# Patient Record
Sex: FEMALE | Race: WHITE | Employment: UNEMPLOYED | URBAN - METROPOLITAN AREA
[De-identification: names, ages, dates, MRNs, and addresses within clinical notes are randomized per-mention and may not be internally consistent; named-entity substitution may affect disease eponyms.]

---

## 2011-01-14 LAB
INR BLD: NORMAL
PROTIME: NORMAL

## 2018-02-08 PROBLEM — S72.432A CLOSED BICONDYLAR FRACTURE OF DISTAL FEMUR, LEFT, INITIAL ENCOUNTER (HCC): Status: ACTIVE | Noted: 2018-02-08

## 2018-02-08 PROBLEM — S72.422A CLOSED BICONDYLAR FRACTURE OF DISTAL FEMUR, LEFT, INITIAL ENCOUNTER (HCC): Status: ACTIVE | Noted: 2018-02-08

## 2018-02-08 PROBLEM — S72.432A CLOSED BICONDYLAR FRACTURE OF DISTAL FEMUR, LEFT, INITIAL ENCOUNTER (HCC): Status: RESOLVED | Noted: 2018-02-08 | Resolved: 2018-02-08

## 2018-02-08 PROBLEM — S72.422A CLOSED BICONDYLAR FRACTURE OF DISTAL FEMUR, LEFT, INITIAL ENCOUNTER (HCC): Status: RESOLVED | Noted: 2018-02-08 | Resolved: 2018-02-08

## 2018-02-08 PROBLEM — S72.451A SUPRACONDYLAR FRACTURE OF FEMUR, RIGHT, CLOSED, INITIAL ENCOUNTER (HCC): Status: ACTIVE | Noted: 2018-02-08

## 2018-02-08 PROBLEM — S72.8X1A OTHER FRACTURE OF RIGHT FEMUR, INITIAL ENCOUNTER FOR CLOSED FRACTURE (HCC): Status: ACTIVE | Noted: 2018-02-08

## 2018-02-08 PROBLEM — M17.11 DEGENERATIVE ARTHRITIS OF RIGHT KNEE: Status: ACTIVE | Noted: 2018-02-08

## 2018-06-19 ENCOUNTER — HOSPITAL ENCOUNTER (INPATIENT)
Age: 83
LOS: 4 days | Discharge: NURSING, REHAB OR LONG-TERM CARE | DRG: 470 | End: 2018-06-23
Attending: EMERGENCY MEDICINE | Admitting: INTERNAL MEDICINE
Payer: MEDICARE

## 2018-06-19 ENCOUNTER — APPOINTMENT (OUTPATIENT)
Dept: GENERAL RADIOLOGY | Age: 83
DRG: 470 | End: 2018-06-19
Payer: MEDICARE

## 2018-06-19 DIAGNOSIS — S72.002A CLOSED FRACTURE OF LEFT HIP, INITIAL ENCOUNTER (HCC): Primary | ICD-10-CM

## 2018-06-19 PROBLEM — M80.059A: Status: ACTIVE | Noted: 2018-06-19

## 2018-06-19 LAB
ABO/RH: NORMAL
ANION GAP SERPL CALCULATED.3IONS-SCNC: 13 MMOL/L (ref 7–16)
ANTIBODY SCREEN: NORMAL
APTT: 40.5 SEC (ref 24.5–35.1)
BASOPHILS ABSOLUTE: 0.08 E9/L (ref 0–0.2)
BASOPHILS RELATIVE PERCENT: 1.3 % (ref 0–2)
BUN BLDV-MCNC: 25 MG/DL (ref 8–23)
CALCIUM SERPL-MCNC: 9.3 MG/DL (ref 8.6–10.2)
CHLORIDE BLD-SCNC: 102 MMOL/L (ref 98–107)
CO2: 25 MMOL/L (ref 22–29)
CREAT SERPL-MCNC: 0.9 MG/DL (ref 0.5–1)
EOSINOPHILS ABSOLUTE: 1.08 E9/L (ref 0.05–0.5)
EOSINOPHILS RELATIVE PERCENT: 17.3 % (ref 0–6)
GFR AFRICAN AMERICAN: >60
GFR NON-AFRICAN AMERICAN: 59 ML/MIN/1.73
GLUCOSE BLD-MCNC: 100 MG/DL (ref 74–109)
HCT VFR BLD CALC: 38.6 % (ref 34–48)
HEMOGLOBIN: 12.6 G/DL (ref 11.5–15.5)
IMMATURE GRANULOCYTES #: 0.02 E9/L
IMMATURE GRANULOCYTES %: 0.3 % (ref 0–5)
INR BLD: 2.6
LYMPHOCYTES ABSOLUTE: 1.42 E9/L (ref 1.5–4)
LYMPHOCYTES RELATIVE PERCENT: 22.8 % (ref 20–42)
MCH RBC QN AUTO: 34 PG (ref 26–35)
MCHC RBC AUTO-ENTMCNC: 32.6 % (ref 32–34.5)
MCV RBC AUTO: 104 FL (ref 80–99.9)
MONOCYTES ABSOLUTE: 0.55 E9/L (ref 0.1–0.95)
MONOCYTES RELATIVE PERCENT: 8.8 % (ref 2–12)
NEUTROPHILS ABSOLUTE: 3.08 E9/L (ref 1.8–7.3)
NEUTROPHILS RELATIVE PERCENT: 49.5 % (ref 43–80)
PDW BLD-RTO: 14.8 FL (ref 11.5–15)
PLATELET # BLD: 204 E9/L (ref 130–450)
PMV BLD AUTO: 10.7 FL (ref 7–12)
POTASSIUM SERPL-SCNC: 4.4 MMOL/L (ref 3.5–5)
PROTHROMBIN TIME: 29.5 SEC (ref 9.3–12.4)
RBC # BLD: 3.71 E12/L (ref 3.5–5.5)
SODIUM BLD-SCNC: 140 MMOL/L (ref 132–146)
WBC # BLD: 6.2 E9/L (ref 4.5–11.5)

## 2018-06-19 PROCEDURE — 85025 COMPLETE CBC W/AUTO DIFF WBC: CPT

## 2018-06-19 PROCEDURE — 99285 EMERGENCY DEPT VISIT HI MDM: CPT

## 2018-06-19 PROCEDURE — 85730 THROMBOPLASTIN TIME PARTIAL: CPT

## 2018-06-19 PROCEDURE — 1200000000 HC SEMI PRIVATE

## 2018-06-19 PROCEDURE — 2580000003 HC RX 258: Performed by: INTERNAL MEDICINE

## 2018-06-19 PROCEDURE — 86850 RBC ANTIBODY SCREEN: CPT

## 2018-06-19 PROCEDURE — 80048 BASIC METABOLIC PNL TOTAL CA: CPT

## 2018-06-19 PROCEDURE — 6360000002 HC RX W HCPCS: Performed by: EMERGENCY MEDICINE

## 2018-06-19 PROCEDURE — 86901 BLOOD TYPING SEROLOGIC RH(D): CPT

## 2018-06-19 PROCEDURE — 6360000002 HC RX W HCPCS: Performed by: INTERNAL MEDICINE

## 2018-06-19 PROCEDURE — 73502 X-RAY EXAM HIP UNI 2-3 VIEWS: CPT

## 2018-06-19 PROCEDURE — 86900 BLOOD TYPING SEROLOGIC ABO: CPT

## 2018-06-19 PROCEDURE — 6370000000 HC RX 637 (ALT 250 FOR IP): Performed by: ORTHOPAEDIC SURGERY

## 2018-06-19 PROCEDURE — 72170 X-RAY EXAM OF PELVIS: CPT

## 2018-06-19 PROCEDURE — 85610 PROTHROMBIN TIME: CPT

## 2018-06-19 PROCEDURE — 71045 X-RAY EXAM CHEST 1 VIEW: CPT

## 2018-06-19 PROCEDURE — 6370000000 HC RX 637 (ALT 250 FOR IP): Performed by: EMERGENCY MEDICINE

## 2018-06-19 RX ORDER — ONDANSETRON 2 MG/ML
4 INJECTION INTRAMUSCULAR; INTRAVENOUS EVERY 6 HOURS PRN
Status: DISCONTINUED | OUTPATIENT
Start: 2018-06-19 | End: 2018-06-23 | Stop reason: HOSPADM

## 2018-06-19 RX ORDER — HYDROCODONE BITARTRATE AND ACETAMINOPHEN 5; 325 MG/1; MG/1
1 TABLET ORAL EVERY 6 HOURS PRN
Status: DISCONTINUED | OUTPATIENT
Start: 2018-06-19 | End: 2018-06-23 | Stop reason: HOSPADM

## 2018-06-19 RX ORDER — ONDANSETRON 4 MG/1
8 TABLET, ORALLY DISINTEGRATING ORAL ONCE
Status: COMPLETED | OUTPATIENT
Start: 2018-06-19 | End: 2018-06-19

## 2018-06-19 RX ORDER — FUROSEMIDE 20 MG/1
20 TABLET ORAL DAILY PRN
Status: DISCONTINUED | OUTPATIENT
Start: 2018-06-19 | End: 2018-06-23 | Stop reason: HOSPADM

## 2018-06-19 RX ORDER — CETIRIZINE HYDROCHLORIDE 10 MG/1
10 TABLET ORAL EVERY MORNING
Status: DISCONTINUED | OUTPATIENT
Start: 2018-06-20 | End: 2018-06-23 | Stop reason: HOSPADM

## 2018-06-19 RX ORDER — DOCUSATE SODIUM 100 MG/1
200 CAPSULE, LIQUID FILLED ORAL EVERY MORNING
Status: DISCONTINUED | OUTPATIENT
Start: 2018-06-20 | End: 2018-06-21 | Stop reason: SDUPTHER

## 2018-06-19 RX ORDER — POTASSIUM CHLORIDE 20 MEQ/1
40 TABLET, EXTENDED RELEASE ORAL PRN
Status: DISCONTINUED | OUTPATIENT
Start: 2018-06-19 | End: 2018-06-23 | Stop reason: HOSPADM

## 2018-06-19 RX ORDER — MORPHINE SULFATE 2 MG/ML
1 INJECTION, SOLUTION INTRAMUSCULAR; INTRAVENOUS EVERY 4 HOURS PRN
Status: DISCONTINUED | OUTPATIENT
Start: 2018-06-19 | End: 2018-06-21

## 2018-06-19 RX ORDER — ALLOPURINOL 300 MG/1
300 TABLET ORAL EVERY MORNING
Status: DISCONTINUED | OUTPATIENT
Start: 2018-06-20 | End: 2018-06-23 | Stop reason: HOSPADM

## 2018-06-19 RX ORDER — SODIUM CHLORIDE 0.9 % (FLUSH) 0.9 %
10 SYRINGE (ML) INJECTION PRN
Status: DISCONTINUED | OUTPATIENT
Start: 2018-06-19 | End: 2018-06-21 | Stop reason: SDUPTHER

## 2018-06-19 RX ORDER — WARFARIN SODIUM 2.5 MG/1
2.5 TABLET ORAL DAILY
COMMUNITY
End: 2020-08-25 | Stop reason: ALTCHOICE

## 2018-06-19 RX ORDER — POTASSIUM CHLORIDE 20MEQ/15ML
40 LIQUID (ML) ORAL PRN
Status: DISCONTINUED | OUTPATIENT
Start: 2018-06-19 | End: 2018-06-23 | Stop reason: HOSPADM

## 2018-06-19 RX ORDER — TRAMADOL HYDROCHLORIDE 50 MG/1
100 TABLET ORAL ONCE
Status: COMPLETED | OUTPATIENT
Start: 2018-06-19 | End: 2018-06-19

## 2018-06-19 RX ORDER — PHYTONADIONE 5 MG/1
5 TABLET ORAL ONCE
Status: COMPLETED | OUTPATIENT
Start: 2018-06-19 | End: 2018-06-19

## 2018-06-19 RX ORDER — OXYBUTYNIN CHLORIDE 5 MG/1
5 TABLET ORAL EVERY MORNING
Status: DISCONTINUED | OUTPATIENT
Start: 2018-06-20 | End: 2018-06-23 | Stop reason: HOSPADM

## 2018-06-19 RX ORDER — POTASSIUM CHLORIDE 7.45 MG/ML
10 INJECTION INTRAVENOUS PRN
Status: DISCONTINUED | OUTPATIENT
Start: 2018-06-19 | End: 2018-06-23 | Stop reason: HOSPADM

## 2018-06-19 RX ORDER — PHYTONADIONE 10 MG/ML
3 INJECTION, EMULSION INTRAMUSCULAR; INTRAVENOUS; SUBCUTANEOUS ONCE
Status: COMPLETED | OUTPATIENT
Start: 2018-06-19 | End: 2018-06-19

## 2018-06-19 RX ORDER — SODIUM CHLORIDE 0.9 % (FLUSH) 0.9 %
10 SYRINGE (ML) INJECTION EVERY 12 HOURS SCHEDULED
Status: DISCONTINUED | OUTPATIENT
Start: 2018-06-19 | End: 2018-06-21 | Stop reason: SDUPTHER

## 2018-06-19 RX ADMIN — ONDANSETRON 8 MG: 4 TABLET, ORALLY DISINTEGRATING ORAL at 18:44

## 2018-06-19 RX ADMIN — Medication 10 ML: at 22:35

## 2018-06-19 RX ADMIN — PHYTONADIONE 5 MG: 5 TABLET ORAL at 19:54

## 2018-06-19 RX ADMIN — PHYTONADIONE 3 MG: 10 INJECTION, EMULSION INTRAMUSCULAR; INTRAVENOUS; SUBCUTANEOUS at 22:23

## 2018-06-19 RX ADMIN — TRAMADOL HYDROCHLORIDE 100 MG: 50 TABLET, FILM COATED ORAL at 18:44

## 2018-06-19 ASSESSMENT — ENCOUNTER SYMPTOMS
SHORTNESS OF BREATH: 0
SORE THROAT: 0
BACK PAIN: 0
COUGH: 0
WHEEZING: 0
RHINORRHEA: 0
CONSTIPATION: 0
DIARRHEA: 0
NAUSEA: 0
ABDOMINAL PAIN: 0
VOMITING: 0
CHEST TIGHTNESS: 0

## 2018-06-19 ASSESSMENT — PAIN DESCRIPTION - PAIN TYPE
TYPE: ACUTE PAIN
TYPE: ACUTE PAIN

## 2018-06-19 ASSESSMENT — PAIN DESCRIPTION - ORIENTATION: ORIENTATION: LEFT

## 2018-06-19 ASSESSMENT — PAIN DESCRIPTION - LOCATION
LOCATION: HIP
LOCATION: HIP

## 2018-06-19 ASSESSMENT — PAIN SCALES - GENERAL
PAINLEVEL_OUTOF10: 2
PAINLEVEL_OUTOF10: 6
PAINLEVEL_OUTOF10: 5

## 2018-06-19 ASSESSMENT — PAIN DESCRIPTION - DESCRIPTORS: DESCRIPTORS: ACHING;CONSTANT;DISCOMFORT

## 2018-06-20 ENCOUNTER — ANESTHESIA EVENT (OUTPATIENT)
Dept: OPERATING ROOM | Age: 83
DRG: 470 | End: 2018-06-20
Payer: MEDICARE

## 2018-06-20 ENCOUNTER — ANESTHESIA (OUTPATIENT)
Dept: OPERATING ROOM | Age: 83
DRG: 470 | End: 2018-06-20
Payer: MEDICARE

## 2018-06-20 LAB
ANION GAP SERPL CALCULATED.3IONS-SCNC: 11 MMOL/L (ref 7–16)
BASOPHILS ABSOLUTE: 0.1 E9/L (ref 0–0.2)
BASOPHILS RELATIVE PERCENT: 2.1 % (ref 0–2)
BUN BLDV-MCNC: 24 MG/DL (ref 8–23)
CALCIUM SERPL-MCNC: 9.3 MG/DL (ref 8.6–10.2)
CHLORIDE BLD-SCNC: 103 MMOL/L (ref 98–107)
CO2: 26 MMOL/L (ref 22–29)
CREAT SERPL-MCNC: 0.9 MG/DL (ref 0.5–1)
EOSINOPHILS ABSOLUTE: 1.02 E9/L (ref 0.05–0.5)
EOSINOPHILS RELATIVE PERCENT: 21.1 % (ref 0–6)
GFR AFRICAN AMERICAN: >60
GFR NON-AFRICAN AMERICAN: 59 ML/MIN/1.73
GLUCOSE BLD-MCNC: 90 MG/DL (ref 74–109)
HCT VFR BLD CALC: 36.3 % (ref 34–48)
HEMOGLOBIN: 11.8 G/DL (ref 11.5–15.5)
IMMATURE GRANULOCYTES #: 0.01 E9/L
IMMATURE GRANULOCYTES %: 0.2 % (ref 0–5)
INR BLD: 1.7
INR BLD: 2.3
LYMPHOCYTES ABSOLUTE: 1.55 E9/L (ref 1.5–4)
LYMPHOCYTES RELATIVE PERCENT: 32.1 % (ref 20–42)
MCH RBC QN AUTO: 33.9 PG (ref 26–35)
MCHC RBC AUTO-ENTMCNC: 32.5 % (ref 32–34.5)
MCV RBC AUTO: 104.3 FL (ref 80–99.9)
MONOCYTES ABSOLUTE: 0.5 E9/L (ref 0.1–0.95)
MONOCYTES RELATIVE PERCENT: 10.4 % (ref 2–12)
NEUTROPHILS ABSOLUTE: 1.65 E9/L (ref 1.8–7.3)
NEUTROPHILS RELATIVE PERCENT: 34.1 % (ref 43–80)
PDW BLD-RTO: 14.8 FL (ref 11.5–15)
PLATELET # BLD: 186 E9/L (ref 130–450)
PMV BLD AUTO: 10.2 FL (ref 7–12)
POTASSIUM REFLEX MAGNESIUM: 4.6 MMOL/L (ref 3.5–5)
PROTHROMBIN TIME: 19.6 SEC (ref 9.3–12.4)
PROTHROMBIN TIME: 26.5 SEC (ref 9.3–12.4)
RBC # BLD: 3.48 E12/L (ref 3.5–5.5)
SODIUM BLD-SCNC: 140 MMOL/L (ref 132–146)
WBC # BLD: 4.8 E9/L (ref 4.5–11.5)

## 2018-06-20 PROCEDURE — 2580000003 HC RX 258: Performed by: INTERNAL MEDICINE

## 2018-06-20 PROCEDURE — 1200000000 HC SEMI PRIVATE

## 2018-06-20 PROCEDURE — 2580000003 HC RX 258: Performed by: ORTHOPAEDIC SURGERY

## 2018-06-20 PROCEDURE — 80048 BASIC METABOLIC PNL TOTAL CA: CPT

## 2018-06-20 PROCEDURE — 6370000000 HC RX 637 (ALT 250 FOR IP): Performed by: INTERNAL MEDICINE

## 2018-06-20 PROCEDURE — 6360000002 HC RX W HCPCS: Performed by: INTERNAL MEDICINE

## 2018-06-20 PROCEDURE — 85025 COMPLETE CBC W/AUTO DIFF WBC: CPT

## 2018-06-20 PROCEDURE — 85610 PROTHROMBIN TIME: CPT

## 2018-06-20 PROCEDURE — 6360000002 HC RX W HCPCS: Performed by: ORTHOPAEDIC SURGERY

## 2018-06-20 PROCEDURE — 36415 COLL VENOUS BLD VENIPUNCTURE: CPT

## 2018-06-20 RX ORDER — PHYTONADIONE 1 MG/.5ML
5 INJECTION, EMULSION INTRAMUSCULAR; INTRAVENOUS; SUBCUTANEOUS ONCE
Status: DISCONTINUED | OUTPATIENT
Start: 2018-06-20 | End: 2018-06-20

## 2018-06-20 RX ORDER — SODIUM CHLORIDE 9 MG/ML
INJECTION, SOLUTION INTRAVENOUS CONTINUOUS
Status: DISCONTINUED | OUTPATIENT
Start: 2018-06-20 | End: 2018-06-23

## 2018-06-20 RX ORDER — PHYTONADIONE 10 MG/ML
5 INJECTION, EMULSION INTRAMUSCULAR; INTRAVENOUS; SUBCUTANEOUS ONCE
Status: COMPLETED | OUTPATIENT
Start: 2018-06-20 | End: 2018-06-20

## 2018-06-20 RX ADMIN — SODIUM CHLORIDE: 9 INJECTION, SOLUTION INTRAVENOUS at 18:12

## 2018-06-20 RX ADMIN — Medication 10 ML: at 10:49

## 2018-06-20 RX ADMIN — MORPHINE SULFATE 1 MG: 2 INJECTION, SOLUTION INTRAMUSCULAR; INTRAVENOUS at 21:47

## 2018-06-20 RX ADMIN — PHYTONADIONE 5 MG: 10 INJECTION, EMULSION INTRAMUSCULAR; INTRAVENOUS; SUBCUTANEOUS at 11:26

## 2018-06-20 RX ADMIN — HYDROCODONE BITARTRATE AND ACETAMINOPHEN 1 TABLET: 5; 325 TABLET ORAL at 16:34

## 2018-06-20 RX ADMIN — MORPHINE SULFATE 1 MG: 2 INJECTION, SOLUTION INTRAMUSCULAR; INTRAVENOUS at 03:37

## 2018-06-20 ASSESSMENT — PAIN SCALES - GENERAL
PAINLEVEL_OUTOF10: 0
PAINLEVEL_OUTOF10: 0
PAINLEVEL_OUTOF10: 7
PAINLEVEL_OUTOF10: 6
PAINLEVEL_OUTOF10: 6
PAINLEVEL_OUTOF10: 2

## 2018-06-20 ASSESSMENT — PAIN DESCRIPTION - DESCRIPTORS
DESCRIPTORS: ACHING;DISCOMFORT
DESCRIPTORS: ACHING;DISCOMFORT;CONSTANT
DESCRIPTORS: ACHING;DISCOMFORT

## 2018-06-20 ASSESSMENT — PAIN DESCRIPTION - PAIN TYPE
TYPE: ACUTE PAIN

## 2018-06-20 ASSESSMENT — PAIN DESCRIPTION - FREQUENCY
FREQUENCY: CONTINUOUS

## 2018-06-20 ASSESSMENT — PAIN DESCRIPTION - PROGRESSION
CLINICAL_PROGRESSION: GRADUALLY IMPROVING
CLINICAL_PROGRESSION: GRADUALLY WORSENING

## 2018-06-20 ASSESSMENT — PAIN DESCRIPTION - ORIENTATION
ORIENTATION: LEFT

## 2018-06-20 ASSESSMENT — PAIN DESCRIPTION - ONSET: ONSET: ON-GOING

## 2018-06-20 ASSESSMENT — PAIN DESCRIPTION - LOCATION
LOCATION: HIP

## 2018-06-21 ENCOUNTER — APPOINTMENT (OUTPATIENT)
Dept: GENERAL RADIOLOGY | Age: 83
DRG: 470 | End: 2018-06-21
Payer: MEDICARE

## 2018-06-21 VITALS — OXYGEN SATURATION: 100 % | DIASTOLIC BLOOD PRESSURE: 73 MMHG | SYSTOLIC BLOOD PRESSURE: 147 MMHG | TEMPERATURE: 97 F

## 2018-06-21 LAB
INR BLD: 1.2
PROTHROMBIN TIME: 13.7 SEC (ref 9.3–12.4)

## 2018-06-21 PROCEDURE — 6370000000 HC RX 637 (ALT 250 FOR IP): Performed by: ORTHOPAEDIC SURGERY

## 2018-06-21 PROCEDURE — 2580000003 HC RX 258: Performed by: NURSE ANESTHETIST, CERTIFIED REGISTERED

## 2018-06-21 PROCEDURE — 2500000003 HC RX 250 WO HCPCS: Performed by: ORTHOPAEDIC SURGERY

## 2018-06-21 PROCEDURE — 3600000004 HC SURGERY LEVEL 4 BASE: Performed by: ORTHOPAEDIC SURGERY

## 2018-06-21 PROCEDURE — 7100000000 HC PACU RECOVERY - FIRST 15 MIN: Performed by: ORTHOPAEDIC SURGERY

## 2018-06-21 PROCEDURE — 2709999900 HC NON-CHARGEABLE SUPPLY: Performed by: ORTHOPAEDIC SURGERY

## 2018-06-21 PROCEDURE — 2500000003 HC RX 250 WO HCPCS: Performed by: NURSE ANESTHETIST, CERTIFIED REGISTERED

## 2018-06-21 PROCEDURE — 1200000000 HC SEMI PRIVATE

## 2018-06-21 PROCEDURE — 87081 CULTURE SCREEN ONLY: CPT

## 2018-06-21 PROCEDURE — 73501 X-RAY EXAM HIP UNI 1 VIEW: CPT

## 2018-06-21 PROCEDURE — 3700000001 HC ADD 15 MINUTES (ANESTHESIA): Performed by: ORTHOPAEDIC SURGERY

## 2018-06-21 PROCEDURE — 3700000000 HC ANESTHESIA ATTENDED CARE: Performed by: ORTHOPAEDIC SURGERY

## 2018-06-21 PROCEDURE — 2580000003 HC RX 258: Performed by: ORTHOPAEDIC SURGERY

## 2018-06-21 PROCEDURE — 0SRS0J9 REPLACEMENT OF LEFT HIP JOINT, FEMORAL SURFACE WITH SYNTHETIC SUBSTITUTE, CEMENTED, OPEN APPROACH: ICD-10-PCS | Performed by: ORTHOPAEDIC SURGERY

## 2018-06-21 PROCEDURE — 6360000002 HC RX W HCPCS

## 2018-06-21 PROCEDURE — 3600000014 HC SURGERY LEVEL 4 ADDTL 15MIN: Performed by: ORTHOPAEDIC SURGERY

## 2018-06-21 PROCEDURE — 6360000002 HC RX W HCPCS: Performed by: ORTHOPAEDIC SURGERY

## 2018-06-21 PROCEDURE — C1713 ANCHOR/SCREW BN/BN,TIS/BN: HCPCS | Performed by: ORTHOPAEDIC SURGERY

## 2018-06-21 PROCEDURE — 6360000002 HC RX W HCPCS: Performed by: NURSE ANESTHETIST, CERTIFIED REGISTERED

## 2018-06-21 PROCEDURE — 7100000001 HC PACU RECOVERY - ADDTL 15 MIN: Performed by: ORTHOPAEDIC SURGERY

## 2018-06-21 PROCEDURE — C1776 JOINT DEVICE (IMPLANTABLE): HCPCS | Performed by: ORTHOPAEDIC SURGERY

## 2018-06-21 PROCEDURE — 88305 TISSUE EXAM BY PATHOLOGIST: CPT

## 2018-06-21 PROCEDURE — 6370000000 HC RX 637 (ALT 250 FOR IP): Performed by: INTERNAL MEDICINE

## 2018-06-21 PROCEDURE — 36415 COLL VENOUS BLD VENIPUNCTURE: CPT

## 2018-06-21 PROCEDURE — 2780000010 HC IMPLANT OTHER: Performed by: ORTHOPAEDIC SURGERY

## 2018-06-21 PROCEDURE — 85610 PROTHROMBIN TIME: CPT

## 2018-06-21 PROCEDURE — 88311 DECALCIFY TISSUE: CPT

## 2018-06-21 DEVICE — RESTRICTOR BNE CEM SM DIA18.5MM UNIV FEM CNL DISP W/ INSRT: Type: IMPLANTABLE DEVICE | Site: HIP | Status: FUNCTIONAL

## 2018-06-21 DEVICE — ADAPTER HEAD FEMORAL UNIPOLAR 12/14 +7.0MM: Type: IMPLANTABLE DEVICE | Site: HIP | Status: FUNCTIONAL

## 2018-06-21 DEVICE — DISCONTINUED USE 415964 CEMENT PALACOS R + G SING DOSE 40GR: Type: IMPLANTABLE DEVICE | Site: HIP | Status: FUNCTIONAL

## 2018-06-21 DEVICE — IMPLANTABLE DEVICE: Type: IMPLANTABLE DEVICE | Site: HIP | Status: FUNCTIONAL

## 2018-06-21 DEVICE — IMPL HIP VERSYS DISTAL CENTRALIZER 10MM: Type: IMPLANTABLE DEVICE | Site: HIP | Status: FUNCTIONAL

## 2018-06-21 RX ORDER — VANCOMYCIN HYDROCHLORIDE 500 MG/10ML
INJECTION, POWDER, LYOPHILIZED, FOR SOLUTION INTRAVENOUS PRN
Status: DISCONTINUED | OUTPATIENT
Start: 2018-06-21 | End: 2018-06-21 | Stop reason: HOSPADM

## 2018-06-21 RX ORDER — ROCURONIUM BROMIDE 10 MG/ML
INJECTION, SOLUTION INTRAVENOUS PRN
Status: DISCONTINUED | OUTPATIENT
Start: 2018-06-21 | End: 2018-06-21 | Stop reason: SDUPTHER

## 2018-06-21 RX ORDER — SODIUM CHLORIDE 9 MG/ML
INJECTION, SOLUTION INTRAVENOUS CONTINUOUS
Status: DISCONTINUED | OUTPATIENT
Start: 2018-06-21 | End: 2018-06-23

## 2018-06-21 RX ORDER — TRAMADOL HYDROCHLORIDE 50 MG/1
50 TABLET ORAL 4 TIMES DAILY
Status: DISCONTINUED | OUTPATIENT
Start: 2018-06-21 | End: 2018-06-23 | Stop reason: HOSPADM

## 2018-06-21 RX ORDER — FENTANYL CITRATE 50 UG/ML
INJECTION, SOLUTION INTRAMUSCULAR; INTRAVENOUS PRN
Status: DISCONTINUED | OUTPATIENT
Start: 2018-06-21 | End: 2018-06-21 | Stop reason: SDUPTHER

## 2018-06-21 RX ORDER — SODIUM CHLORIDE, SODIUM LACTATE, POTASSIUM CHLORIDE, CALCIUM CHLORIDE 600; 310; 30; 20 MG/100ML; MG/100ML; MG/100ML; MG/100ML
INJECTION, SOLUTION INTRAVENOUS CONTINUOUS PRN
Status: DISCONTINUED | OUTPATIENT
Start: 2018-06-21 | End: 2018-06-21 | Stop reason: SDUPTHER

## 2018-06-21 RX ORDER — SODIUM CHLORIDE 0.9 % (FLUSH) 0.9 %
10 SYRINGE (ML) INJECTION PRN
Status: DISCONTINUED | OUTPATIENT
Start: 2018-06-21 | End: 2018-06-23 | Stop reason: HOSPADM

## 2018-06-21 RX ORDER — CEFAZOLIN SODIUM 1 G/3ML
INJECTION, POWDER, FOR SOLUTION INTRAMUSCULAR; INTRAVENOUS
Status: DISCONTINUED
Start: 2018-06-21 | End: 2018-06-21 | Stop reason: WASHOUT

## 2018-06-21 RX ORDER — DEXAMETHASONE SODIUM PHOSPHATE 4 MG/ML
INJECTION, SOLUTION INTRA-ARTICULAR; INTRALESIONAL; INTRAMUSCULAR; INTRAVENOUS; SOFT TISSUE PRN
Status: DISCONTINUED | OUTPATIENT
Start: 2018-06-21 | End: 2018-06-21 | Stop reason: SDUPTHER

## 2018-06-21 RX ORDER — FENTANYL CITRATE 50 UG/ML
50 INJECTION, SOLUTION INTRAMUSCULAR; INTRAVENOUS EVERY 5 MIN PRN
Status: DISCONTINUED | OUTPATIENT
Start: 2018-06-21 | End: 2018-06-21 | Stop reason: HOSPADM

## 2018-06-21 RX ORDER — PROPOFOL 10 MG/ML
INJECTION, EMULSION INTRAVENOUS CONTINUOUS PRN
Status: DISCONTINUED | OUTPATIENT
Start: 2018-06-21 | End: 2018-06-21

## 2018-06-21 RX ORDER — OXYCODONE HYDROCHLORIDE 5 MG/1
2.5 TABLET ORAL EVERY 4 HOURS PRN
Status: DISCONTINUED | OUTPATIENT
Start: 2018-06-21 | End: 2018-06-23 | Stop reason: HOSPADM

## 2018-06-21 RX ORDER — MEPERIDINE HYDROCHLORIDE 25 MG/ML
12.5 INJECTION INTRAMUSCULAR; INTRAVENOUS; SUBCUTANEOUS
Status: DISCONTINUED | OUTPATIENT
Start: 2018-06-21 | End: 2018-06-21 | Stop reason: HOSPADM

## 2018-06-21 RX ORDER — WARFARIN SODIUM 2.5 MG/1
2.5 TABLET ORAL
Status: COMPLETED | OUTPATIENT
Start: 2018-06-21 | End: 2018-06-21

## 2018-06-21 RX ORDER — LIDOCAINE HYDROCHLORIDE 20 MG/ML
INJECTION, SOLUTION INFILTRATION; PERINEURAL PRN
Status: DISCONTINUED | OUTPATIENT
Start: 2018-06-21 | End: 2018-06-21 | Stop reason: SDUPTHER

## 2018-06-21 RX ORDER — OXYCODONE HYDROCHLORIDE AND ACETAMINOPHEN 5; 325 MG/1; MG/1
1 TABLET ORAL
Status: DISCONTINUED | OUTPATIENT
Start: 2018-06-21 | End: 2018-06-21 | Stop reason: HOSPADM

## 2018-06-21 RX ORDER — OXYCODONE HYDROCHLORIDE 5 MG/1
5 TABLET ORAL EVERY 6 HOURS PRN
Status: DISCONTINUED | OUTPATIENT
Start: 2018-06-21 | End: 2018-06-23 | Stop reason: HOSPADM

## 2018-06-21 RX ORDER — SODIUM CHLORIDE 0.9 % (FLUSH) 0.9 %
10 SYRINGE (ML) INJECTION EVERY 12 HOURS SCHEDULED
Status: DISCONTINUED | OUTPATIENT
Start: 2018-06-21 | End: 2018-06-23 | Stop reason: HOSPADM

## 2018-06-21 RX ORDER — ACETAMINOPHEN 325 MG/1
650 TABLET ORAL 4 TIMES DAILY
Status: DISCONTINUED | OUTPATIENT
Start: 2018-06-21 | End: 2018-06-23 | Stop reason: HOSPADM

## 2018-06-21 RX ORDER — DOCUSATE SODIUM 100 MG/1
100 CAPSULE, LIQUID FILLED ORAL 2 TIMES DAILY
Status: DISCONTINUED | OUTPATIENT
Start: 2018-06-21 | End: 2018-06-23 | Stop reason: HOSPADM

## 2018-06-21 RX ORDER — PROMETHAZINE HYDROCHLORIDE 25 MG/ML
6.25 INJECTION, SOLUTION INTRAMUSCULAR; INTRAVENOUS
Status: DISCONTINUED | OUTPATIENT
Start: 2018-06-21 | End: 2018-06-21 | Stop reason: HOSPADM

## 2018-06-21 RX ORDER — DIPHENHYDRAMINE HYDROCHLORIDE 50 MG/ML
12.5 INJECTION INTRAMUSCULAR; INTRAVENOUS
Status: DISCONTINUED | OUTPATIENT
Start: 2018-06-21 | End: 2018-06-21 | Stop reason: HOSPADM

## 2018-06-21 RX ORDER — ONDANSETRON 2 MG/ML
INJECTION INTRAMUSCULAR; INTRAVENOUS PRN
Status: DISCONTINUED | OUTPATIENT
Start: 2018-06-21 | End: 2018-06-21 | Stop reason: SDUPTHER

## 2018-06-21 RX ORDER — GLYCOPYRROLATE 1 MG/5 ML
SYRINGE (ML) INTRAVENOUS PRN
Status: DISCONTINUED | OUTPATIENT
Start: 2018-06-21 | End: 2018-06-21 | Stop reason: SDUPTHER

## 2018-06-21 RX ORDER — NEOSTIGMINE METHYLSULFATE 1 MG/ML
INJECTION, SOLUTION INTRAVENOUS PRN
Status: DISCONTINUED | OUTPATIENT
Start: 2018-06-21 | End: 2018-06-21 | Stop reason: SDUPTHER

## 2018-06-21 RX ORDER — SODIUM CHLORIDE 9 MG/ML
INJECTION, SOLUTION INTRAVENOUS CONTINUOUS PRN
Status: DISCONTINUED | OUTPATIENT
Start: 2018-06-21 | End: 2018-06-21 | Stop reason: SDUPTHER

## 2018-06-21 RX ORDER — PROPOFOL 10 MG/ML
INJECTION, EMULSION INTRAVENOUS PRN
Status: DISCONTINUED | OUTPATIENT
Start: 2018-06-21 | End: 2018-06-21 | Stop reason: SDUPTHER

## 2018-06-21 RX ADMIN — ONDANSETRON 4 MG: 2 INJECTION, SOLUTION INTRAMUSCULAR; INTRAVENOUS at 17:17

## 2018-06-21 RX ADMIN — CEFAZOLIN: 1 INJECTION, POWDER, FOR SOLUTION INTRAMUSCULAR; INTRAVENOUS; PARENTERAL at 16:30

## 2018-06-21 RX ADMIN — CEFAZOLIN SODIUM 1 G: 1 SOLUTION INTRAVENOUS at 17:05

## 2018-06-21 RX ADMIN — METOPROLOL TARTRATE 25 MG: 25 TABLET ORAL at 11:15

## 2018-06-21 RX ADMIN — SODIUM CHLORIDE, POTASSIUM CHLORIDE, SODIUM LACTATE AND CALCIUM CHLORIDE: 600; 310; 30; 20 INJECTION, SOLUTION INTRAVENOUS at 19:01

## 2018-06-21 RX ADMIN — TRANEXAMIC ACID 1000 MG: 1 INJECTION, SOLUTION INTRAVENOUS at 16:30

## 2018-06-21 RX ADMIN — Medication 0.6 MG: at 19:21

## 2018-06-21 RX ADMIN — TRANEXAMIC ACID: 100 INJECTION, SOLUTION INTRAVENOUS at 19:20

## 2018-06-21 RX ADMIN — FENTANYL CITRATE 100 MCG: 50 INJECTION, SOLUTION INTRAMUSCULAR; INTRAVENOUS at 18:00

## 2018-06-21 RX ADMIN — DOCUSATE SODIUM 100 MG: 100 CAPSULE, LIQUID FILLED ORAL at 22:18

## 2018-06-21 RX ADMIN — ROCURONIUM BROMIDE 10 MG: 10 SOLUTION INTRAVENOUS at 18:00

## 2018-06-21 RX ADMIN — LIDOCAINE HYDROCHLORIDE 100 MG: 20 INJECTION, SOLUTION INFILTRATION; PERINEURAL at 17:09

## 2018-06-21 RX ADMIN — TRANEXAMIC ACID 1 G: 100 INJECTION, SOLUTION INTRAVENOUS at 18:50

## 2018-06-21 RX ADMIN — SODIUM CHLORIDE: 9 INJECTION, SOLUTION INTRAVENOUS at 21:39

## 2018-06-21 RX ADMIN — HYDROCODONE BITARTRATE AND ACETAMINOPHEN 1 TABLET: 5; 325 TABLET ORAL at 02:14

## 2018-06-21 RX ADMIN — Medication 3 MG: at 19:21

## 2018-06-21 RX ADMIN — DEXAMETHASONE SODIUM PHOSPHATE 10 MG: 4 INJECTION, SOLUTION INTRA-ARTICULAR; INTRALESIONAL; INTRAMUSCULAR; INTRAVENOUS; SOFT TISSUE at 17:17

## 2018-06-21 RX ADMIN — PHENYLEPHRINE HYDROCHLORIDE 100 MCG: 10 INJECTION INTRAVENOUS at 18:57

## 2018-06-21 RX ADMIN — PROPOFOL 100 MG: 10 INJECTION, EMULSION INTRAVENOUS at 17:06

## 2018-06-21 RX ADMIN — SODIUM CHLORIDE: 9 INJECTION, SOLUTION INTRAVENOUS at 17:00

## 2018-06-21 RX ADMIN — FENTANYL CITRATE 50 MCG: 50 INJECTION, SOLUTION INTRAMUSCULAR; INTRAVENOUS at 17:09

## 2018-06-21 RX ADMIN — ACETAMINOPHEN 650 MG: 325 TABLET ORAL at 22:18

## 2018-06-21 RX ADMIN — TRAMADOL HYDROCHLORIDE 50 MG: 50 TABLET, FILM COATED ORAL at 22:18

## 2018-06-21 RX ADMIN — WARFARIN SODIUM 2.5 MG: 2.5 TABLET ORAL at 22:18

## 2018-06-21 RX ADMIN — TRANEXAMIC ACID 1000 MG: 1 INJECTION, SOLUTION INTRAVENOUS at 20:14

## 2018-06-21 ASSESSMENT — PULMONARY FUNCTION TESTS
PIF_VALUE: 18
PIF_VALUE: 1
PIF_VALUE: 18
PIF_VALUE: 19
PIF_VALUE: 0
PIF_VALUE: 19
PIF_VALUE: 19
PIF_VALUE: 20
PIF_VALUE: 17
PIF_VALUE: 19
PIF_VALUE: 10
PIF_VALUE: 18
PIF_VALUE: 16
PIF_VALUE: 19
PIF_VALUE: 2
PIF_VALUE: 19
PIF_VALUE: 16
PIF_VALUE: 19
PIF_VALUE: 18
PIF_VALUE: 16
PIF_VALUE: 19
PIF_VALUE: 18
PIF_VALUE: 19
PIF_VALUE: 19
PIF_VALUE: 18
PIF_VALUE: 2
PIF_VALUE: 18
PIF_VALUE: 18
PIF_VALUE: 6
PIF_VALUE: 19
PIF_VALUE: 18
PIF_VALUE: 19
PIF_VALUE: 18
PIF_VALUE: 30
PIF_VALUE: 19
PIF_VALUE: 20
PIF_VALUE: 18
PIF_VALUE: 18
PIF_VALUE: 19
PIF_VALUE: 19
PIF_VALUE: 18
PIF_VALUE: 18
PIF_VALUE: 4
PIF_VALUE: 19
PIF_VALUE: 18
PIF_VALUE: 2
PIF_VALUE: 19
PIF_VALUE: 0
PIF_VALUE: 1
PIF_VALUE: 19
PIF_VALUE: 2
PIF_VALUE: 19
PIF_VALUE: 16
PIF_VALUE: 19
PIF_VALUE: 2
PIF_VALUE: 2
PIF_VALUE: 19
PIF_VALUE: 18
PIF_VALUE: 19
PIF_VALUE: 10
PIF_VALUE: 2
PIF_VALUE: 19
PIF_VALUE: 19
PIF_VALUE: 18
PIF_VALUE: 17
PIF_VALUE: 18
PIF_VALUE: 19
PIF_VALUE: 19
PIF_VALUE: 18
PIF_VALUE: 10
PIF_VALUE: 19
PIF_VALUE: 19
PIF_VALUE: 10
PIF_VALUE: 1
PIF_VALUE: 19
PIF_VALUE: 18
PIF_VALUE: 19
PIF_VALUE: 7
PIF_VALUE: 18
PIF_VALUE: 10
PIF_VALUE: 0
PIF_VALUE: 2
PIF_VALUE: 19
PIF_VALUE: 19
PIF_VALUE: 18
PIF_VALUE: 5
PIF_VALUE: 19
PIF_VALUE: 10
PIF_VALUE: 1
PIF_VALUE: 18
PIF_VALUE: 10
PIF_VALUE: 19
PIF_VALUE: 17
PIF_VALUE: 19
PIF_VALUE: 18
PIF_VALUE: 19
PIF_VALUE: 23
PIF_VALUE: 2
PIF_VALUE: 18
PIF_VALUE: 19
PIF_VALUE: 18
PIF_VALUE: 18
PIF_VALUE: 12
PIF_VALUE: 2
PIF_VALUE: 1
PIF_VALUE: 19
PIF_VALUE: 10
PIF_VALUE: 19
PIF_VALUE: 18
PIF_VALUE: 16
PIF_VALUE: 19
PIF_VALUE: 16
PIF_VALUE: 19
PIF_VALUE: 19
PIF_VALUE: 17
PIF_VALUE: 21
PIF_VALUE: 18
PIF_VALUE: 19
PIF_VALUE: 20
PIF_VALUE: 19
PIF_VALUE: 2
PIF_VALUE: 10
PIF_VALUE: 18
PIF_VALUE: 19

## 2018-06-21 ASSESSMENT — PAIN SCALES - GENERAL
PAINLEVEL_OUTOF10: 4
PAINLEVEL_OUTOF10: 0

## 2018-06-21 ASSESSMENT — PAIN DESCRIPTION - ORIENTATION: ORIENTATION: LEFT

## 2018-06-21 ASSESSMENT — PAIN DESCRIPTION - PAIN TYPE: TYPE: ACUTE PAIN

## 2018-06-21 ASSESSMENT — PAIN DESCRIPTION - LOCATION: LOCATION: LEG

## 2018-06-21 ASSESSMENT — PAIN - FUNCTIONAL ASSESSMENT: PAIN_FUNCTIONAL_ASSESSMENT: 0-10

## 2018-06-21 ASSESSMENT — PAIN DESCRIPTION - DESCRIPTORS: DESCRIPTORS: ACHING;DISCOMFORT

## 2018-06-22 LAB
ANION GAP SERPL CALCULATED.3IONS-SCNC: 13 MMOL/L (ref 7–16)
BUN BLDV-MCNC: 25 MG/DL (ref 8–23)
CALCIUM SERPL-MCNC: 8.8 MG/DL (ref 8.6–10.2)
CHLORIDE BLD-SCNC: 101 MMOL/L (ref 98–107)
CO2: 23 MMOL/L (ref 22–29)
CREAT SERPL-MCNC: 0.9 MG/DL (ref 0.5–1)
GFR AFRICAN AMERICAN: >60
GFR NON-AFRICAN AMERICAN: 59 ML/MIN/1.73
GLUCOSE BLD-MCNC: 185 MG/DL (ref 74–109)
HCT VFR BLD CALC: 35 % (ref 34–48)
HEMOGLOBIN: 11.5 G/DL (ref 11.5–15.5)
INR BLD: 1.2
MCH RBC QN AUTO: 33.8 PG (ref 26–35)
MCHC RBC AUTO-ENTMCNC: 32.9 % (ref 32–34.5)
MCV RBC AUTO: 102.9 FL (ref 80–99.9)
PDW BLD-RTO: 14.5 FL (ref 11.5–15)
PLATELET # BLD: 171 E9/L (ref 130–450)
PMV BLD AUTO: 10.3 FL (ref 7–12)
POTASSIUM REFLEX MAGNESIUM: 5 MMOL/L (ref 3.5–5)
PROTHROMBIN TIME: 14 SEC (ref 9.3–12.4)
RBC # BLD: 3.4 E12/L (ref 3.5–5.5)
SODIUM BLD-SCNC: 137 MMOL/L (ref 132–146)
WBC # BLD: 5.7 E9/L (ref 4.5–11.5)

## 2018-06-22 PROCEDURE — 97530 THERAPEUTIC ACTIVITIES: CPT

## 2018-06-22 PROCEDURE — 36415 COLL VENOUS BLD VENIPUNCTURE: CPT

## 2018-06-22 PROCEDURE — 80048 BASIC METABOLIC PNL TOTAL CA: CPT

## 2018-06-22 PROCEDURE — 85610 PROTHROMBIN TIME: CPT

## 2018-06-22 PROCEDURE — 6370000000 HC RX 637 (ALT 250 FOR IP): Performed by: INTERNAL MEDICINE

## 2018-06-22 PROCEDURE — 6370000000 HC RX 637 (ALT 250 FOR IP): Performed by: ORTHOPAEDIC SURGERY

## 2018-06-22 PROCEDURE — G8979 MOBILITY GOAL STATUS: HCPCS

## 2018-06-22 PROCEDURE — G8987 SELF CARE CURRENT STATUS: HCPCS

## 2018-06-22 PROCEDURE — 85027 COMPLETE CBC AUTOMATED: CPT

## 2018-06-22 PROCEDURE — 97162 PT EVAL MOD COMPLEX 30 MIN: CPT

## 2018-06-22 PROCEDURE — 6360000002 HC RX W HCPCS: Performed by: ORTHOPAEDIC SURGERY

## 2018-06-22 PROCEDURE — G8978 MOBILITY CURRENT STATUS: HCPCS

## 2018-06-22 PROCEDURE — 2580000003 HC RX 258: Performed by: ORTHOPAEDIC SURGERY

## 2018-06-22 PROCEDURE — 1200000000 HC SEMI PRIVATE

## 2018-06-22 PROCEDURE — G8988 SELF CARE GOAL STATUS: HCPCS

## 2018-06-22 PROCEDURE — 97166 OT EVAL MOD COMPLEX 45 MIN: CPT

## 2018-06-22 RX ORDER — WARFARIN SODIUM 2.5 MG/1
2.5 TABLET ORAL
Status: COMPLETED | OUTPATIENT
Start: 2018-06-22 | End: 2018-06-22

## 2018-06-22 RX ADMIN — DOCUSATE SODIUM 100 MG: 100 CAPSULE, LIQUID FILLED ORAL at 20:45

## 2018-06-22 RX ADMIN — ACETAMINOPHEN 650 MG: 325 TABLET ORAL at 20:45

## 2018-06-22 RX ADMIN — DOCUSATE SODIUM 100 MG: 100 CAPSULE, LIQUID FILLED ORAL at 09:30

## 2018-06-22 RX ADMIN — METOPROLOL TARTRATE 25 MG: 25 TABLET ORAL at 20:45

## 2018-06-22 RX ADMIN — TRAMADOL HYDROCHLORIDE 50 MG: 50 TABLET, FILM COATED ORAL at 17:48

## 2018-06-22 RX ADMIN — ACETAMINOPHEN 650 MG: 325 TABLET ORAL at 09:30

## 2018-06-22 RX ADMIN — CETIRIZINE HYDROCHLORIDE 10 MG: 10 TABLET, FILM COATED ORAL at 09:29

## 2018-06-22 RX ADMIN — ACETAMINOPHEN 650 MG: 325 TABLET ORAL at 14:49

## 2018-06-22 RX ADMIN — TRAMADOL HYDROCHLORIDE 50 MG: 50 TABLET, FILM COATED ORAL at 20:46

## 2018-06-22 RX ADMIN — TRAMADOL HYDROCHLORIDE 50 MG: 50 TABLET, FILM COATED ORAL at 14:50

## 2018-06-22 RX ADMIN — ACETAMINOPHEN 650 MG: 325 TABLET ORAL at 17:48

## 2018-06-22 RX ADMIN — CEFAZOLIN 2 G: 10 INJECTION, POWDER, FOR SOLUTION INTRAVENOUS; PARENTERAL at 01:22

## 2018-06-22 RX ADMIN — OXYBUTYNIN CHLORIDE 5 MG: 5 TABLET ORAL at 09:29

## 2018-06-22 RX ADMIN — CEFAZOLIN 2 G: 10 INJECTION, POWDER, FOR SOLUTION INTRAVENOUS; PARENTERAL at 09:30

## 2018-06-22 RX ADMIN — TRAMADOL HYDROCHLORIDE 50 MG: 50 TABLET, FILM COATED ORAL at 09:29

## 2018-06-22 RX ADMIN — ALLOPURINOL 300 MG: 300 TABLET ORAL at 09:29

## 2018-06-22 RX ADMIN — ENOXAPARIN SODIUM 30 MG: 30 INJECTION SUBCUTANEOUS at 09:29

## 2018-06-22 RX ADMIN — METOPROLOL TARTRATE 25 MG: 25 TABLET ORAL at 09:30

## 2018-06-22 RX ADMIN — WARFARIN SODIUM 2.5 MG: 2.5 TABLET ORAL at 17:48

## 2018-06-22 ASSESSMENT — PAIN SCALES - GENERAL
PAINLEVEL_OUTOF10: 0

## 2018-06-23 VITALS
HEART RATE: 74 BPM | RESPIRATION RATE: 16 BRPM | SYSTOLIC BLOOD PRESSURE: 110 MMHG | BODY MASS INDEX: 29.49 KG/M2 | TEMPERATURE: 97.7 F | HEIGHT: 65 IN | OXYGEN SATURATION: 97 % | DIASTOLIC BLOOD PRESSURE: 53 MMHG | WEIGHT: 177 LBS

## 2018-06-23 PROBLEM — M17.11 DEGENERATIVE ARTHRITIS OF RIGHT KNEE: Status: RESOLVED | Noted: 2018-02-08 | Resolved: 2018-06-23

## 2018-06-23 PROBLEM — M80.059A: Status: RESOLVED | Noted: 2018-06-19 | Resolved: 2018-06-23

## 2018-06-23 LAB
INR BLD: 1.1
MRSA CULTURE ONLY: NORMAL
PROTHROMBIN TIME: 12.5 SEC (ref 9.3–12.4)

## 2018-06-23 PROCEDURE — 97110 THERAPEUTIC EXERCISES: CPT

## 2018-06-23 PROCEDURE — 6360000002 HC RX W HCPCS: Performed by: ORTHOPAEDIC SURGERY

## 2018-06-23 PROCEDURE — 36415 COLL VENOUS BLD VENIPUNCTURE: CPT

## 2018-06-23 PROCEDURE — 85610 PROTHROMBIN TIME: CPT

## 2018-06-23 PROCEDURE — 97140 MANUAL THERAPY 1/> REGIONS: CPT

## 2018-06-23 PROCEDURE — 6370000000 HC RX 637 (ALT 250 FOR IP): Performed by: INTERNAL MEDICINE

## 2018-06-23 PROCEDURE — 97530 THERAPEUTIC ACTIVITIES: CPT

## 2018-06-23 PROCEDURE — 6370000000 HC RX 637 (ALT 250 FOR IP): Performed by: ORTHOPAEDIC SURGERY

## 2018-06-23 PROCEDURE — 2700000000 HC OXYGEN THERAPY PER DAY

## 2018-06-23 PROCEDURE — 97116 GAIT TRAINING THERAPY: CPT

## 2018-06-23 RX ORDER — WARFARIN SODIUM 2.5 MG/1
2.5 TABLET ORAL
Status: DISCONTINUED | OUTPATIENT
Start: 2018-06-23 | End: 2018-06-23

## 2018-06-23 RX ORDER — TRAMADOL HYDROCHLORIDE 50 MG/1
50 TABLET ORAL EVERY 6 HOURS PRN
Qty: 28 TABLET | Refills: 0 | Status: SHIPPED | OUTPATIENT
Start: 2018-06-23 | End: 2018-06-30

## 2018-06-23 RX ORDER — WARFARIN SODIUM 5 MG/1
5 TABLET ORAL
Status: DISCONTINUED | OUTPATIENT
Start: 2018-06-23 | End: 2018-06-23 | Stop reason: HOSPADM

## 2018-06-23 RX ORDER — ACETAMINOPHEN 325 MG/1
650 TABLET ORAL EVERY 6 HOURS PRN
Qty: 120 TABLET | Refills: 3 | Status: SHIPPED | OUTPATIENT
Start: 2018-06-23

## 2018-06-23 RX ORDER — OXYCODONE HYDROCHLORIDE 5 MG/1
2.5 TABLET ORAL EVERY 4 HOURS PRN
Qty: 14 TABLET | Refills: 0 | Status: SHIPPED | OUTPATIENT
Start: 2018-06-23 | End: 2018-06-30

## 2018-06-23 RX ADMIN — TRAMADOL HYDROCHLORIDE 50 MG: 50 TABLET, FILM COATED ORAL at 13:17

## 2018-06-23 RX ADMIN — ENOXAPARIN SODIUM 30 MG: 30 INJECTION SUBCUTANEOUS at 09:15

## 2018-06-23 RX ADMIN — CETIRIZINE HYDROCHLORIDE 10 MG: 10 TABLET, FILM COATED ORAL at 09:16

## 2018-06-23 RX ADMIN — METOPROLOL TARTRATE 25 MG: 25 TABLET ORAL at 09:16

## 2018-06-23 RX ADMIN — ACETAMINOPHEN 650 MG: 325 TABLET ORAL at 09:16

## 2018-06-23 RX ADMIN — OXYBUTYNIN CHLORIDE 5 MG: 5 TABLET ORAL at 09:16

## 2018-06-23 RX ADMIN — ACETAMINOPHEN 650 MG: 325 TABLET ORAL at 13:17

## 2018-06-23 RX ADMIN — ALLOPURINOL 300 MG: 300 TABLET ORAL at 09:16

## 2018-06-23 RX ADMIN — DOCUSATE SODIUM 100 MG: 100 CAPSULE, LIQUID FILLED ORAL at 09:16

## 2018-06-23 RX ADMIN — TRAMADOL HYDROCHLORIDE 50 MG: 50 TABLET, FILM COATED ORAL at 09:16

## 2018-06-23 ASSESSMENT — PAIN SCALES - GENERAL
PAINLEVEL_OUTOF10: 0

## 2018-06-25 ENCOUNTER — HOSPITAL ENCOUNTER (OUTPATIENT)
Age: 83
Discharge: HOME | End: 2018-06-27
Payer: MEDICARE

## 2018-06-25 LAB
ALBUMIN SERPL-MCNC: 2.8 G/DL (ref 3.5–5.2)
ALP BLD-CCNC: 210 U/L (ref 35–104)
ALT SERPL-CCNC: 22 U/L (ref 0–32)
ANION GAP SERPL CALCULATED.3IONS-SCNC: 11 MMOL/L (ref 7–16)
AST SERPL-CCNC: 51 U/L (ref 0–31)
BASOPHILS ABSOLUTE: 0.05 E9/L (ref 0–0.2)
BASOPHILS RELATIVE PERCENT: 0.8 % (ref 0–2)
BILIRUB SERPL-MCNC: 0.6 MG/DL (ref 0–1.2)
BUN BLDV-MCNC: 28 MG/DL (ref 8–23)
CALCIUM SERPL-MCNC: 8.5 MG/DL (ref 8.6–10.2)
CHLORIDE BLD-SCNC: 106 MMOL/L (ref 98–107)
CHOLESTEROL, TOTAL: 117 MG/DL (ref 0–199)
CO2: 23 MMOL/L (ref 22–29)
CREAT SERPL-MCNC: 1.2 MG/DL (ref 0.5–1)
EOSINOPHILS ABSOLUTE: 1.34 E9/L (ref 0.05–0.5)
EOSINOPHILS RELATIVE PERCENT: 22.3 % (ref 0–6)
GFR AFRICAN AMERICAN: 51
GFR NON-AFRICAN AMERICAN: 42 ML/MIN/1.73
GLUCOSE BLD-MCNC: 92 MG/DL (ref 74–109)
HCT VFR BLD CALC: 28.9 % (ref 34–48)
HDLC SERPL-MCNC: 32 MG/DL
HEMOGLOBIN: 9.4 G/DL (ref 11.5–15.5)
IMMATURE GRANULOCYTES #: 0.02 E9/L
IMMATURE GRANULOCYTES %: 0.3 % (ref 0–5)
INR BLD: 1.2
LDL CHOLESTEROL CALCULATED: 62 MG/DL (ref 0–99)
LYMPHOCYTES ABSOLUTE: 1.52 E9/L (ref 1.5–4)
LYMPHOCYTES RELATIVE PERCENT: 25.3 % (ref 20–42)
MCH RBC QN AUTO: 33.2 PG (ref 26–35)
MCHC RBC AUTO-ENTMCNC: 32.5 % (ref 32–34.5)
MCV RBC AUTO: 102.1 FL (ref 80–99.9)
MONOCYTES ABSOLUTE: 0.77 E9/L (ref 0.1–0.95)
MONOCYTES RELATIVE PERCENT: 12.8 % (ref 2–12)
NEUTROPHILS ABSOLUTE: 2.3 E9/L (ref 1.8–7.3)
NEUTROPHILS RELATIVE PERCENT: 38.5 % (ref 43–80)
PDW BLD-RTO: 14.8 FL (ref 11.5–15)
PLATELET # BLD: 136 E9/L (ref 130–450)
PMV BLD AUTO: 11.1 FL (ref 7–12)
POTASSIUM SERPL-SCNC: 4.2 MMOL/L (ref 3.5–5)
PROTHROMBIN TIME: 13.8 SEC (ref 9.3–12.4)
RBC # BLD: 2.83 E12/L (ref 3.5–5.5)
SODIUM BLD-SCNC: 140 MMOL/L (ref 132–146)
T4 FREE: 1.28 NG/DL (ref 0.93–1.7)
TOTAL PROTEIN: 5.1 G/DL (ref 6.4–8.3)
TRIGL SERPL-MCNC: 117 MG/DL (ref 0–149)
TSH SERPL DL<=0.05 MIU/L-ACNC: 2.23 UIU/ML (ref 0.27–4.2)
VLDLC SERPL CALC-MCNC: 23 MG/DL
WBC # BLD: 6 E9/L (ref 4.5–11.5)

## 2018-06-25 PROCEDURE — 80061 LIPID PANEL: CPT

## 2018-06-25 PROCEDURE — 84439 ASSAY OF FREE THYROXINE: CPT

## 2018-06-25 PROCEDURE — 84443 ASSAY THYROID STIM HORMONE: CPT

## 2018-06-25 PROCEDURE — 85610 PROTHROMBIN TIME: CPT

## 2018-06-25 PROCEDURE — 80053 COMPREHEN METABOLIC PANEL: CPT

## 2018-06-25 PROCEDURE — 85025 COMPLETE CBC W/AUTO DIFF WBC: CPT

## 2018-07-03 ENCOUNTER — HOSPITAL ENCOUNTER (OUTPATIENT)
Age: 83
Discharge: HOME | End: 2018-07-05
Payer: MEDICARE

## 2018-07-03 LAB
ALBUMIN SERPL-MCNC: 2.9 G/DL (ref 3.5–5.2)
ALP BLD-CCNC: 243 U/L (ref 35–104)
ALT SERPL-CCNC: 34 U/L (ref 0–32)
ANION GAP SERPL CALCULATED.3IONS-SCNC: 12 MMOL/L (ref 7–16)
AST SERPL-CCNC: 60 U/L (ref 0–31)
BASOPHILS ABSOLUTE: 0.07 E9/L (ref 0–0.2)
BASOPHILS RELATIVE PERCENT: 1.3 % (ref 0–2)
BILIRUB SERPL-MCNC: 0.5 MG/DL (ref 0–1.2)
BUN BLDV-MCNC: 13 MG/DL (ref 8–23)
CALCIUM SERPL-MCNC: 8.4 MG/DL (ref 8.6–10.2)
CHLORIDE BLD-SCNC: 101 MMOL/L (ref 98–107)
CO2: 27 MMOL/L (ref 22–29)
CREAT SERPL-MCNC: 0.9 MG/DL (ref 0.5–1)
EOSINOPHILS ABSOLUTE: 0.44 E9/L (ref 0.05–0.5)
EOSINOPHILS RELATIVE PERCENT: 8.3 % (ref 0–6)
GFR AFRICAN AMERICAN: >60
GFR NON-AFRICAN AMERICAN: 59 ML/MIN/1.73
GLUCOSE BLD-MCNC: 87 MG/DL (ref 74–109)
HCT VFR BLD CALC: 31.3 % (ref 34–48)
HEMOGLOBIN: 10.1 G/DL (ref 11.5–15.5)
IMMATURE GRANULOCYTES #: 0.01 E9/L
IMMATURE GRANULOCYTES %: 0.2 % (ref 0–5)
LYMPHOCYTES ABSOLUTE: 1.39 E9/L (ref 1.5–4)
LYMPHOCYTES RELATIVE PERCENT: 26.4 % (ref 20–42)
MCH RBC QN AUTO: 33.8 PG (ref 26–35)
MCHC RBC AUTO-ENTMCNC: 32.3 % (ref 32–34.5)
MCV RBC AUTO: 104.7 FL (ref 80–99.9)
MONOCYTES ABSOLUTE: 0.53 E9/L (ref 0.1–0.95)
MONOCYTES RELATIVE PERCENT: 10.1 % (ref 2–12)
NEUTROPHILS ABSOLUTE: 2.83 E9/L (ref 1.8–7.3)
NEUTROPHILS RELATIVE PERCENT: 53.7 % (ref 43–80)
PDW BLD-RTO: 15.2 FL (ref 11.5–15)
PLATELET # BLD: 145 E9/L (ref 130–450)
PMV BLD AUTO: 11.4 FL (ref 7–12)
POTASSIUM SERPL-SCNC: 4 MMOL/L (ref 3.5–5)
RBC # BLD: 2.99 E12/L (ref 3.5–5.5)
SODIUM BLD-SCNC: 140 MMOL/L (ref 132–146)
TOTAL PROTEIN: 5.5 G/DL (ref 6.4–8.3)
WBC # BLD: 5.3 E9/L (ref 4.5–11.5)

## 2018-07-03 PROCEDURE — 85025 COMPLETE CBC W/AUTO DIFF WBC: CPT

## 2018-07-03 PROCEDURE — 80053 COMPREHEN METABOLIC PANEL: CPT

## 2018-07-10 ENCOUNTER — HOSPITAL ENCOUNTER (OUTPATIENT)
Age: 83
Discharge: HOME | End: 2018-07-12
Payer: MEDICARE

## 2018-07-10 LAB
ALBUMIN SERPL-MCNC: 3 G/DL (ref 3.5–5.2)
ALP BLD-CCNC: 192 U/L (ref 35–104)
ALT SERPL-CCNC: 29 U/L (ref 0–32)
ANION GAP SERPL CALCULATED.3IONS-SCNC: 8 MMOL/L (ref 7–16)
AST SERPL-CCNC: 36 U/L (ref 0–31)
BASOPHILS ABSOLUTE: 0.07 E9/L (ref 0–0.2)
BASOPHILS RELATIVE PERCENT: 1.4 % (ref 0–2)
BILIRUB SERPL-MCNC: 0.3 MG/DL (ref 0–1.2)
BUN BLDV-MCNC: 24 MG/DL (ref 8–23)
CALCIUM SERPL-MCNC: 8.2 MG/DL (ref 8.6–10.2)
CHLORIDE BLD-SCNC: 103 MMOL/L (ref 98–107)
CO2: 28 MMOL/L (ref 22–29)
CREAT SERPL-MCNC: 1 MG/DL (ref 0.5–1)
EOSINOPHILS ABSOLUTE: 0.52 E9/L (ref 0.05–0.5)
EOSINOPHILS RELATIVE PERCENT: 10.4 % (ref 0–6)
GFR AFRICAN AMERICAN: >60
GFR NON-AFRICAN AMERICAN: 52 ML/MIN/1.73
GLUCOSE BLD-MCNC: 99 MG/DL (ref 74–109)
HCT VFR BLD CALC: 30.5 % (ref 34–48)
HEMOGLOBIN: 9.7 G/DL (ref 11.5–15.5)
IMMATURE GRANULOCYTES #: 0.01 E9/L
IMMATURE GRANULOCYTES %: 0.2 % (ref 0–5)
LYMPHOCYTES ABSOLUTE: 1.74 E9/L (ref 1.5–4)
LYMPHOCYTES RELATIVE PERCENT: 34.8 % (ref 20–42)
MCH RBC QN AUTO: 33.2 PG (ref 26–35)
MCHC RBC AUTO-ENTMCNC: 31.8 % (ref 32–34.5)
MCV RBC AUTO: 104.5 FL (ref 80–99.9)
MONOCYTES ABSOLUTE: 0.76 E9/L (ref 0.1–0.95)
MONOCYTES RELATIVE PERCENT: 15.2 % (ref 2–12)
NEUTROPHILS ABSOLUTE: 1.9 E9/L (ref 1.8–7.3)
NEUTROPHILS RELATIVE PERCENT: 38 % (ref 43–80)
PDW BLD-RTO: 14.8 FL (ref 11.5–15)
PLATELET # BLD: 152 E9/L (ref 130–450)
PMV BLD AUTO: 11.5 FL (ref 7–12)
POTASSIUM SERPL-SCNC: 4.6 MMOL/L (ref 3.5–5)
RBC # BLD: 2.92 E12/L (ref 3.5–5.5)
SODIUM BLD-SCNC: 139 MMOL/L (ref 132–146)
TOTAL PROTEIN: 5.5 G/DL (ref 6.4–8.3)
WBC # BLD: 5 E9/L (ref 4.5–11.5)

## 2018-07-10 PROCEDURE — 80053 COMPREHEN METABOLIC PANEL: CPT

## 2018-07-10 PROCEDURE — 85025 COMPLETE CBC W/AUTO DIFF WBC: CPT

## 2018-07-11 ENCOUNTER — HOSPITAL ENCOUNTER (OUTPATIENT)
Age: 83
Discharge: HOME | End: 2018-07-13
Payer: MEDICARE

## 2018-07-11 PROCEDURE — 87088 URINE BACTERIA CULTURE: CPT

## 2018-07-13 LAB — URINE CULTURE, ROUTINE: NORMAL

## 2018-11-12 LAB
BILIRUBIN, URINE: NORMAL
BLOOD, URINE: NORMAL
CLARITY: NORMAL
COLOR: NORMAL
GLUCOSE URINE: NORMAL
KETONES, URINE: NORMAL
LEUKOCYTE ESTERASE, URINE: NORMAL
NITRITE, URINE: NORMAL
PH UA: NORMAL
PROTEIN UA: NORMAL
SPECIFIC GRAVITY, URINE: NORMAL
UROBILINOGEN, URINE: NORMAL

## 2019-08-27 ENCOUNTER — OFFICE VISIT (OUTPATIENT)
Dept: CARDIOLOGY CLINIC | Age: 84
End: 2019-08-27
Payer: MEDICARE

## 2019-08-27 ENCOUNTER — NURSE ONLY (OUTPATIENT)
Dept: NON INVASIVE DIAGNOSTICS | Age: 84
End: 2019-08-27
Payer: MEDICARE

## 2019-08-27 VITALS
WEIGHT: 159.1 LBS | HEART RATE: 82 BPM | DIASTOLIC BLOOD PRESSURE: 60 MMHG | BODY MASS INDEX: 29.28 KG/M2 | SYSTOLIC BLOOD PRESSURE: 114 MMHG | RESPIRATION RATE: 16 BRPM | HEIGHT: 62 IN

## 2019-08-27 DIAGNOSIS — I25.10 CORONARY ARTERY DISEASE INVOLVING NATIVE CORONARY ARTERY OF NATIVE HEART WITHOUT ANGINA PECTORIS: ICD-10-CM

## 2019-08-27 DIAGNOSIS — I48.20 CHRONIC ATRIAL FIBRILLATION (HCC): Primary | ICD-10-CM

## 2019-08-27 DIAGNOSIS — Z95.0 CARDIAC PACEMAKER: ICD-10-CM

## 2019-08-27 DIAGNOSIS — Z95.0 CARDIAC PACEMAKER: Primary | ICD-10-CM

## 2019-08-27 DIAGNOSIS — E78.5 HYPERLIPIDEMIA WITH TARGET LDL LESS THAN 100: ICD-10-CM

## 2019-08-27 DIAGNOSIS — I48.20 CHRONIC ATRIAL FIBRILLATION (HCC): ICD-10-CM

## 2019-08-27 DIAGNOSIS — I10 ESSENTIAL HYPERTENSION, BENIGN: ICD-10-CM

## 2019-08-27 PROCEDURE — 1123F ACP DISCUSS/DSCN MKR DOCD: CPT | Performed by: INTERNAL MEDICINE

## 2019-08-27 PROCEDURE — 93000 ELECTROCARDIOGRAM COMPLETE: CPT | Performed by: INTERNAL MEDICINE

## 2019-08-27 PROCEDURE — G8419 CALC BMI OUT NRM PARAM NOF/U: HCPCS | Performed by: INTERNAL MEDICINE

## 2019-08-27 PROCEDURE — G8427 DOCREV CUR MEDS BY ELIG CLIN: HCPCS | Performed by: INTERNAL MEDICINE

## 2019-08-27 PROCEDURE — 1090F PRES/ABSN URINE INCON ASSESS: CPT | Performed by: INTERNAL MEDICINE

## 2019-08-27 PROCEDURE — 4040F PNEUMOC VAC/ADMIN/RCVD: CPT | Performed by: INTERNAL MEDICINE

## 2019-08-27 PROCEDURE — 1036F TOBACCO NON-USER: CPT | Performed by: INTERNAL MEDICINE

## 2019-08-27 PROCEDURE — G8598 ASA/ANTIPLAT THER USED: HCPCS | Performed by: INTERNAL MEDICINE

## 2019-08-27 PROCEDURE — 99214 OFFICE O/P EST MOD 30 MIN: CPT | Performed by: INTERNAL MEDICINE

## 2019-08-27 PROCEDURE — 93279 PRGRMG DEV EVAL PM/LDLS PM: CPT | Performed by: INTERNAL MEDICINE

## 2020-02-04 LAB
ALBUMIN SERPL-MCNC: NORMAL G/DL
ALP BLD-CCNC: NORMAL U/L
ALT SERPL-CCNC: NORMAL U/L
ANION GAP SERPL CALCULATED.3IONS-SCNC: NORMAL MMOL/L
AST SERPL-CCNC: NORMAL U/L
BASOPHILS ABSOLUTE: NORMAL
BASOPHILS RELATIVE PERCENT: NORMAL
BILIRUB SERPL-MCNC: NORMAL MG/DL
BUN BLDV-MCNC: NORMAL MG/DL
CALCIUM SERPL-MCNC: NORMAL MG/DL
CHLORIDE BLD-SCNC: NORMAL MMOL/L
CHOLESTEROL, TOTAL: NORMAL
CHOLESTEROL/HDL RATIO: NORMAL
CO2: NORMAL
CREAT SERPL-MCNC: NORMAL MG/DL
EOSINOPHILS ABSOLUTE: NORMAL
EOSINOPHILS RELATIVE PERCENT: NORMAL
GFR CALCULATED: NORMAL
GLUCOSE BLD-MCNC: NORMAL MG/DL
HCT VFR BLD CALC: NORMAL %
HDLC SERPL-MCNC: NORMAL MG/DL
HEMOGLOBIN: NORMAL
LDL CHOLESTEROL CALCULATED: NORMAL
LYMPHOCYTES ABSOLUTE: NORMAL
LYMPHOCYTES RELATIVE PERCENT: NORMAL
MCH RBC QN AUTO: NORMAL PG
MCHC RBC AUTO-ENTMCNC: NORMAL G/DL
MCV RBC AUTO: NORMAL FL
MONOCYTES ABSOLUTE: NORMAL
MONOCYTES RELATIVE PERCENT: NORMAL
NEUTROPHILS ABSOLUTE: NORMAL
NEUTROPHILS RELATIVE PERCENT: NORMAL
PLATELET # BLD: NORMAL 10*3/UL
PMV BLD AUTO: NORMAL FL
POTASSIUM SERPL-SCNC: NORMAL MMOL/L
RBC # BLD: NORMAL 10*6/UL
SODIUM BLD-SCNC: NORMAL MMOL/L
TOTAL PROTEIN: NORMAL
TRIGL SERPL-MCNC: NORMAL MG/DL
VLDLC SERPL CALC-MCNC: NORMAL MG/DL
WBC # BLD: NORMAL 10*3/UL

## 2020-02-09 LAB
INR BLD: NORMAL
PROTIME: NORMAL

## 2020-03-23 VITALS — SYSTOLIC BLOOD PRESSURE: 130 MMHG | DIASTOLIC BLOOD PRESSURE: 76 MMHG | TEMPERATURE: 97.6 F | HEART RATE: 74 BPM

## 2020-03-23 RX ORDER — POTASSIUM CHLORIDE 1500 MG/1
20 TABLET, FILM COATED, EXTENDED RELEASE ORAL
COMMUNITY
End: 2020-09-08

## 2020-03-23 RX ORDER — SENNOSIDES 8.6 MG
2 TABLET ORAL DAILY
COMMUNITY
End: 2020-09-08

## 2020-07-21 ENCOUNTER — ANTI-COAG VISIT (OUTPATIENT)
Dept: FAMILY MEDICINE CLINIC | Age: 85
End: 2020-07-21

## 2020-07-21 LAB — INR BLD: 2.8

## 2020-07-28 ENCOUNTER — ANTI-COAG VISIT (OUTPATIENT)
Dept: FAMILY MEDICINE CLINIC | Age: 85
End: 2020-07-28

## 2020-07-28 LAB — INR BLD: 2.3

## 2020-08-04 LAB — INR BLD: 2.6

## 2020-08-05 ENCOUNTER — ANTI-COAG VISIT (OUTPATIENT)
Dept: FAMILY MEDICINE CLINIC | Age: 85
End: 2020-08-05

## 2020-08-06 ENCOUNTER — ANTI-COAG VISIT (OUTPATIENT)
Dept: FAMILY MEDICINE CLINIC | Age: 85
End: 2020-08-06

## 2020-08-11 LAB — INR BLD: 2.3

## 2020-08-12 ENCOUNTER — ANTI-COAG VISIT (OUTPATIENT)
Dept: FAMILY MEDICINE CLINIC | Age: 85
End: 2020-08-12

## 2020-08-18 ENCOUNTER — HOSPITAL ENCOUNTER (OUTPATIENT)
Age: 85
Discharge: HOME | End: 2020-08-20
Payer: MEDICARE

## 2020-08-18 ENCOUNTER — NURSE ONLY (OUTPATIENT)
Dept: FAMILY MEDICINE CLINIC | Age: 85
End: 2020-08-18
Payer: MEDICARE

## 2020-08-18 ENCOUNTER — ANTI-COAG VISIT (OUTPATIENT)
Dept: FAMILY MEDICINE CLINIC | Age: 85
End: 2020-08-18

## 2020-08-18 LAB
BILIRUBIN, POC: NEGATIVE
BLOOD URINE, POC: NORMAL
CLARITY, POC: NORMAL
COLOR, POC: NORMAL
GLUCOSE URINE, POC: NEGATIVE
INR BLD: 2.1
INR BLD: NORMAL
KETONES, POC: NEGATIVE
LEUKOCYTE EST, POC: NORMAL
NITRITE, POC: POSITIVE
PH, POC: 6
PROTEIN, POC: NEGATIVE
PROTIME: NORMAL
SPECIFIC GRAVITY, POC: 1.02
UROBILINOGEN, POC: NORMAL

## 2020-08-18 PROCEDURE — 87186 SC STD MICRODIL/AGAR DIL: CPT

## 2020-08-18 PROCEDURE — 87088 URINE BACTERIA CULTURE: CPT

## 2020-08-18 PROCEDURE — 81002 URINALYSIS NONAUTO W/O SCOPE: CPT | Performed by: NURSE PRACTITIONER

## 2020-08-18 PROCEDURE — 87077 CULTURE AEROBIC IDENTIFY: CPT

## 2020-08-18 RX ORDER — CIPROFLOXACIN 500 MG/1
500 TABLET, FILM COATED ORAL 2 TIMES DAILY
Qty: 20 TABLET | Refills: 0 | Status: SHIPPED | OUTPATIENT
Start: 2020-08-18 | End: 2020-08-28

## 2020-08-20 LAB
ORGANISM: ABNORMAL
URINE CULTURE, ROUTINE: ABNORMAL

## 2020-08-20 RX ORDER — SULFAMETHOXAZOLE AND TRIMETHOPRIM 800; 160 MG/1; MG/1
1 TABLET ORAL 2 TIMES DAILY
Qty: 20 TABLET | Refills: 0 | Status: SHIPPED | OUTPATIENT
Start: 2020-08-20 | End: 2020-08-30

## 2020-08-25 ENCOUNTER — TELEPHONE (OUTPATIENT)
Dept: FAMILY MEDICINE CLINIC | Age: 85
End: 2020-08-25

## 2020-08-25 ENCOUNTER — ANTI-COAG VISIT (OUTPATIENT)
Dept: FAMILY MEDICINE CLINIC | Age: 85
End: 2020-08-25

## 2020-08-25 LAB — INR BLD: 1.5

## 2020-08-25 RX ORDER — WARFARIN SODIUM 5 MG/1
5 TABLET ORAL
COMMUNITY
Start: 2020-08-04 | End: 2020-12-07 | Stop reason: SDUPTHER

## 2020-08-25 NOTE — TELEPHONE ENCOUNTER
Warfarin dosage incorrect on med list. I corrected dose. Pt takes Warfarin 5mg 1 daily Monday through Friday and 1/2 tab daily Saturday and Sunday. INR: 1.5  I gave Jun instructions for Warfarin per Patrick.

## 2020-08-27 ENCOUNTER — TELEPHONE (OUTPATIENT)
Dept: FAMILY MEDICINE CLINIC | Age: 85
End: 2020-08-27

## 2020-09-01 LAB
INR BLD: 1.9
INR BLD: NORMAL
PROTIME: NORMAL

## 2020-09-03 ENCOUNTER — ANTI-COAG VISIT (OUTPATIENT)
Dept: FAMILY MEDICINE CLINIC | Age: 85
End: 2020-09-03

## 2020-09-06 ENCOUNTER — APPOINTMENT (OUTPATIENT)
Dept: GENERAL RADIOLOGY | Age: 85
DRG: 481 | End: 2020-09-06
Payer: MEDICARE

## 2020-09-06 ENCOUNTER — APPOINTMENT (OUTPATIENT)
Dept: CT IMAGING | Age: 85
DRG: 481 | End: 2020-09-06
Payer: MEDICARE

## 2020-09-06 ENCOUNTER — HOSPITAL ENCOUNTER (EMERGENCY)
Age: 85
Discharge: HOME | DRG: 481 | End: 2020-09-07
Payer: MEDICARE

## 2020-09-06 LAB
ANION GAP SERPL CALCULATED.3IONS-SCNC: 11 MMOL/L (ref 7–16)
BASOPHILS ABSOLUTE: 0.06 E9/L (ref 0–0.2)
BASOPHILS RELATIVE PERCENT: 0.6 % (ref 0–2)
BUN BLDV-MCNC: 24 MG/DL (ref 8–23)
CALCIUM SERPL-MCNC: 9.5 MG/DL (ref 8.6–10.2)
CHLORIDE BLD-SCNC: 100 MMOL/L (ref 98–107)
CO2: 21 MMOL/L (ref 22–29)
CREAT SERPL-MCNC: 1.5 MG/DL (ref 0.5–1)
EOSINOPHILS ABSOLUTE: 0.22 E9/L (ref 0.05–0.5)
EOSINOPHILS RELATIVE PERCENT: 2.2 % (ref 0–6)
GFR AFRICAN AMERICAN: 39
GFR NON-AFRICAN AMERICAN: 32 ML/MIN/1.73
GLUCOSE BLD-MCNC: 129 MG/DL (ref 74–99)
HCT VFR BLD CALC: 40.5 % (ref 34–48)
HEMOGLOBIN: 13.5 G/DL (ref 11.5–15.5)
IMMATURE GRANULOCYTES #: 0.05 E9/L
IMMATURE GRANULOCYTES %: 0.5 % (ref 0–5)
INR BLD: 4.1
LYMPHOCYTES ABSOLUTE: 1.36 E9/L (ref 1.5–4)
LYMPHOCYTES RELATIVE PERCENT: 13.8 % (ref 20–42)
MCH RBC QN AUTO: 34.4 PG (ref 26–35)
MCHC RBC AUTO-ENTMCNC: 33.3 % (ref 32–34.5)
MCV RBC AUTO: 103.3 FL (ref 80–99.9)
MONOCYTES ABSOLUTE: 0.56 E9/L (ref 0.1–0.95)
MONOCYTES RELATIVE PERCENT: 5.7 % (ref 2–12)
NEUTROPHILS ABSOLUTE: 7.59 E9/L (ref 1.8–7.3)
NEUTROPHILS RELATIVE PERCENT: 77.2 % (ref 43–80)
PDW BLD-RTO: 13.6 FL (ref 11.5–15)
PLATELET # BLD: 130 E9/L (ref 130–450)
PMV BLD AUTO: 11.8 FL (ref 7–12)
POTASSIUM SERPL-SCNC: 4.9 MMOL/L (ref 3.5–5)
PROTHROMBIN TIME: 50 SEC (ref 9.3–12.4)
RBC # BLD: 3.92 E12/L (ref 3.5–5.5)
SODIUM BLD-SCNC: 132 MMOL/L (ref 132–146)
TROPONIN: 0.02 NG/ML (ref 0–0.03)
WBC # BLD: 9.8 E9/L (ref 4.5–11.5)

## 2020-09-06 PROCEDURE — 90471 IMMUNIZATION ADMIN: CPT | Performed by: PHYSICIAN ASSISTANT

## 2020-09-06 PROCEDURE — 73502 X-RAY EXAM HIP UNI 2-3 VIEWS: CPT

## 2020-09-06 PROCEDURE — 71045 X-RAY EXAM CHEST 1 VIEW: CPT

## 2020-09-06 PROCEDURE — 73700 CT LOWER EXTREMITY W/O DYE: CPT

## 2020-09-06 PROCEDURE — 70450 CT HEAD/BRAIN W/O DYE: CPT

## 2020-09-06 PROCEDURE — 85610 PROTHROMBIN TIME: CPT

## 2020-09-06 PROCEDURE — 6370000000 HC RX 637 (ALT 250 FOR IP): Performed by: PHYSICIAN ASSISTANT

## 2020-09-06 PROCEDURE — 85025 COMPLETE CBC W/AUTO DIFF WBC: CPT

## 2020-09-06 PROCEDURE — 6360000002 HC RX W HCPCS: Performed by: PHYSICIAN ASSISTANT

## 2020-09-06 PROCEDURE — 73552 X-RAY EXAM OF FEMUR 2/>: CPT

## 2020-09-06 PROCEDURE — 80048 BASIC METABOLIC PNL TOTAL CA: CPT

## 2020-09-06 PROCEDURE — 84484 ASSAY OF TROPONIN QUANT: CPT

## 2020-09-06 PROCEDURE — 93005 ELECTROCARDIOGRAM TRACING: CPT | Performed by: PHYSICIAN ASSISTANT

## 2020-09-06 PROCEDURE — 72125 CT NECK SPINE W/O DYE: CPT

## 2020-09-06 PROCEDURE — 90715 TDAP VACCINE 7 YRS/> IM: CPT | Performed by: PHYSICIAN ASSISTANT

## 2020-09-06 PROCEDURE — 99285 EMERGENCY DEPT VISIT HI MDM: CPT

## 2020-09-06 RX ORDER — ACETAMINOPHEN 325 MG/1
650 TABLET ORAL ONCE
Status: COMPLETED | OUTPATIENT
Start: 2020-09-06 | End: 2020-09-06

## 2020-09-06 RX ADMIN — ACETAMINOPHEN 650 MG: 325 TABLET ORAL at 22:33

## 2020-09-06 RX ADMIN — TETANUS TOXOID, REDUCED DIPHTHERIA TOXOID AND ACELLULAR PERTUSSIS VACCINE, ADSORBED 0.5 ML: 5; 2.5; 8; 8; 2.5 SUSPENSION INTRAMUSCULAR at 22:34

## 2020-09-06 ASSESSMENT — PAIN DESCRIPTION - PAIN TYPE: TYPE: ACUTE PAIN

## 2020-09-06 ASSESSMENT — PAIN SCALES - GENERAL
PAINLEVEL_OUTOF10: 6
PAINLEVEL_OUTOF10: 6

## 2020-09-07 VITALS
BODY MASS INDEX: 29.08 KG/M2 | SYSTOLIC BLOOD PRESSURE: 110 MMHG | RESPIRATION RATE: 16 BRPM | OXYGEN SATURATION: 98 % | WEIGHT: 159 LBS | TEMPERATURE: 96.9 F | HEART RATE: 78 BPM | DIASTOLIC BLOOD PRESSURE: 64 MMHG

## 2020-09-07 LAB
EKG ATRIAL RATE: 120 BPM
EKG Q-T INTERVAL: 380 MS
EKG QRS DURATION: 84 MS
EKG QTC CALCULATION (BAZETT): 464 MS
EKG R AXIS: 5 DEGREES
EKG T AXIS: -55 DEGREES
EKG VENTRICULAR RATE: 90 BPM

## 2020-09-07 PROCEDURE — 93010 ELECTROCARDIOGRAM REPORT: CPT | Performed by: INTERNAL MEDICINE

## 2020-09-07 RX ORDER — BACITRACIN ZINC AND POLYMYXIN B SULFATE 500; 1000 [USP'U]/G; [USP'U]/G
OINTMENT TOPICAL
Qty: 30 G | Refills: 0 | Status: SHIPPED | OUTPATIENT
Start: 2020-09-07 | End: 2020-09-08

## 2020-09-07 NOTE — ED PROVIDER NOTES
Independent MLP  HPI:  9/6/20, Time: 8:45 PM EDT         Jose Luis Owen is a 80 y.o. female presenting to the ED for fall, beginning prior to arrival ago. The complaint has been persistent, moderate in severity, and worsened by movement of right hip upper leg . Patient comes in with complaint of fall. She tripped and fell onto her right side. Complaining of right upper leg pain. Skin tear right arm. Patient is on Coumadin. Patient comes in with complaint of fall. She tripped and fell onto her right side no loss of consciousness she does not believe she hit her head but she was unsure. She has a skin tear to the right arm with dressing and antibiotic ointment in place family. Patient is on Coumadin. She complains of right right hip pain. Patient family states they did not attempt to ambulate her. Review of Systems:   Pertinent positives and negatives are stated within HPI, all other systems reviewed and are negative.          --------------------------------------------- PAST HISTORY ---------------------------------------------  Past Medical History:  has a past medical history of Allergic rhinitis, Asthma, Atrial fibrillation ([facility] [address] CAD (coronary artery disease), Diabetes mellitus ([facility] [address] DJD (degenerative joint disease), Frequent UTI, Hearing loss, Hyperlipidemia, Hypertension, Liver disease, Myalgia, Osteopenia, Palmar erythema, Peptic reflux disease, Urinary incontinence, Vitamin B12 deficiency, and Wears glasses. Past Surgical History:  has a past surgical history that includes Diagnostic Cardiac Cath Lab Procedure; Coronary angioplasty; Cardiac pacemaker placement; joint replacement (Left, 5/3/2013); pacemaker placement (2006); Colonoscopy; Hysterectomy; Fixation Kyphoplasty (5/21/15); pr partial hip replacement (Left, 6/21/2018); eye surgery (Bilateral); Appendectomy; Tonsillectomy; and vitrectomy (Left). Social History:  reports that she has never smoked.  She has never used smokeless tobacco. She reports that she does not drink alcohol or use drugs. Family History: family history includes Breast Cancer in her mother and sister; Coronary Art Dis in her father and mother; Diabetes in her father and mother. The patients home medications have been reviewed.     Allergies: Augmentin [amoxicillin-pot clavulanate] and Crestor [rosuvastatin]    -------------------------------------------------- RESULTS -------------------------------------------------  All laboratory and radiology results have been personally reviewed by myself   LABS:  Results for orders placed or performed during the hospital encounter of 09/06/20   Protime-INR   Result Value Ref Range    Protime 50.0 (H) 9.3 - 12.4 sec    INR 4.1    CBC Auto Differential   Result Value Ref Range    WBC 9.8 4.5 - 11.5 E9/L    RBC 3.92 3.50 - 5.50 E12/L    Hemoglobin 13.5 11.5 - 15.5 g/dL    Hematocrit 40.5 34.0 - 48.0 %    .3 (H) 80.0 - 99.9 fL    MCH 34.4 26.0 - 35.0 pg    MCHC 33.3 32.0 - 34.5 %    RDW 13.6 11.5 - 15.0 fL    Platelets 579 619 - 998 E9/L    MPV 11.8 7.0 - 12.0 fL    Neutrophils % 77.2 43.0 - 80.0 %    Immature Granulocytes % 0.5 0.0 - 5.0 %    Lymphocytes % 13.8 (L) 20.0 - 42.0 %    Monocytes % 5.7 2.0 - 12.0 %    Eosinophils % 2.2 0.0 - 6.0 %    Basophils % 0.6 0.0 - 2.0 %    Neutrophils Absolute 7.59 (H) 1.80 - 7.30 E9/L    Immature Granulocytes # 0.05 E9/L    Lymphocytes Absolute 1.36 (L) 1.50 - 4.00 E9/L    Monocytes Absolute 0.56 0.10 - 0.95 E9/L    Eosinophils Absolute 0.22 0.05 - 0.50 E9/L    Basophils Absolute 0.06 0.00 - 0.20 L8/E   Basic Metabolic Panel   Result Value Ref Range    Sodium 132 132 - 146 mmol/L    Potassium 4.9 3.5 - 5.0 mmol/L    Chloride 100 98 - 107 mmol/L    CO2 21 (L) 22 - 29 mmol/L    Anion Gap 11 7 - 16 mmol/L    Glucose 129 (H) 74 - 99 mg/dL    BUN 24 (H) 8 - 23 mg/dL    CREATININE 1.5 (H) 0.5 - 1.0 mg/dL    GFR Non-African American 32 >=60 mL/min/1.73    GFR  39 nursing notes within the ED encounter and vital signs as below have been reviewed. /64   Pulse 78   Temp 96.9 °F (36.1 °C) (Infrared)   Resp 16   Wt 159 lb (72.1 kg)   SpO2 98%   BMI 29.08 kg/m²   Oxygen Saturation Interpretation: Normal      ---------------------------------------------------PHYSICAL EXAM--------------------------------------      Constitutional/General: Alert and oriented x3, well appearing, non toxic in NAD  Head: Normocephalic and atraumatic  Eyes: PERRL, EOMI  Mouth: Oropharynx clear, handling secretions, no trismus  Neck: Supple, full ROM,   Pulmonary: Lungs clear to auscultation bilaterally, no wheezes, rales, or rhonchi. Not in respiratory distress  Cardiovascular:  Regular rate and rhythm, no murmurs, gallops, or rubs. 2+ distal pulses  Abdomen: Soft, non tender, non distended,   Extremities: Moves all extremities x 4. Warm and well perfused  Skin: warm and dry without rash  Neurologic: GCS 15,  Psych: Normal Affect      ------------------------------ ED COURSE/MEDICAL DECISION MAKING----------------------  Medications   Tetanus-Diphth-Acell Pertussis (BOOSTRIX) injection 0.5 mL (0.5 mLs Intramuscular Given 9/6/20 2234)   acetaminophen (TYLENOL) tablet 650 mg (650 mg Oral Given 9/6/20 2233)         ED COURSE:     EKG # 1 Interpreted by emergency department physician unless otherwise noted. Time:  2307   Rate: 90  Rhythm: Atrial fibrillation. Interpretation: atrial fibrillation, rate 90. Medical Decision Making:    Patient came in with complaint of mechanical fall with injury to her right hip there was question of possible acute fracture versus remote fracture changes. CT showed no acute fracture. CT of the head was obtained patient on Coumadin elevated INR of 4 repeat CT after 4 hours was obtained there was no intracranial bleed CT cervical no fracture or dislocation.   Patient was encouraged to hold Coumadin for today and follow with primary care on Monday for further recommendations of dosing she had a skin tear of the right arm bacitracin to the area daily    Counseling: The emergency provider has spoken with the patient and discussed todays results, in addition to providing specific details for the plan of care and counseling regarding the diagnosis and prognosis. Questions are answered at this time and they are agreeable with the plan.      --------------------------------- IMPRESSION AND DISPOSITION ---------------------------------    IMPRESSION  1. Fall as cause of accidental injury in home as place of occurrence, initial encounter    2. Closed head injury, initial encounter    3. Contusion of right hip, initial encounter    4. Elevated INR        DISPOSITION  Disposition: Discharge to home  Patient condition is good      NOTE: This report was transcribed using voice recognition software.  Every effort was made to ensure accuracy; however, inadvertent computerized transcription errors may be present     Julien Pickering [place]  09/07/20 0246

## 2020-09-08 ENCOUNTER — TELEPHONE (OUTPATIENT)
Dept: FAMILY MEDICINE CLINIC | Age: 85
End: 2020-09-08

## 2020-09-08 ENCOUNTER — APPOINTMENT (OUTPATIENT)
Dept: GENERAL RADIOLOGY | Age: 85
DRG: 481 | End: 2020-09-08
Payer: MEDICARE

## 2020-09-08 ENCOUNTER — HOSPITAL ENCOUNTER (INPATIENT)
Age: 85
LOS: 3 days | Discharge: OTHER INSTITUTION | DRG: 481 | End: 2020-09-11
Attending: EMERGENCY MEDICINE | Admitting: INTERNAL MEDICINE
Payer: MEDICARE

## 2020-09-08 PROBLEM — N17.9 AKI (ACUTE KIDNEY INJURY) (HCC): Status: ACTIVE | Noted: 2020-09-08

## 2020-09-08 LAB
ALBUMIN SERPL-MCNC: 3.9 G/DL (ref 3.5–5.2)
ALP BLD-CCNC: 102 U/L (ref 35–104)
ALT SERPL-CCNC: 16 U/L (ref 0–32)
ANION GAP SERPL CALCULATED.3IONS-SCNC: 10 MMOL/L (ref 7–16)
ANION GAP SERPL CALCULATED.3IONS-SCNC: 11 MMOL/L (ref 7–16)
AST SERPL-CCNC: 25 U/L (ref 0–31)
BACTERIA: ABNORMAL /HPF
BASOPHILS ABSOLUTE: 0.07 E9/L (ref 0–0.2)
BASOPHILS RELATIVE PERCENT: 0.8 % (ref 0–2)
BILIRUB SERPL-MCNC: 0.5 MG/DL (ref 0–1.2)
BILIRUBIN URINE: NEGATIVE
BLOOD, URINE: NEGATIVE
BUN BLDV-MCNC: 28 MG/DL (ref 8–23)
BUN BLDV-MCNC: 29 MG/DL (ref 8–23)
CALCIUM SERPL-MCNC: 8.8 MG/DL (ref 8.6–10.2)
CALCIUM SERPL-MCNC: 9.4 MG/DL (ref 8.6–10.2)
CHLORIDE BLD-SCNC: 103 MMOL/L (ref 98–107)
CHLORIDE BLD-SCNC: 106 MMOL/L (ref 98–107)
CLARITY: CLEAR
CO2: 17 MMOL/L (ref 22–29)
CO2: 22 MMOL/L (ref 22–29)
COLOR: YELLOW
CREAT SERPL-MCNC: 1.2 MG/DL (ref 0.5–1)
CREAT SERPL-MCNC: 1.3 MG/DL (ref 0.5–1)
EKG ATRIAL RATE: 375 BPM
EKG Q-T INTERVAL: 364 MS
EKG QRS DURATION: 88 MS
EKG QTC CALCULATION (BAZETT): 469 MS
EKG R AXIS: 25 DEGREES
EKG T AXIS: -84 DEGREES
EKG VENTRICULAR RATE: 100 BPM
EOSINOPHILS ABSOLUTE: 0.35 E9/L (ref 0.05–0.5)
EOSINOPHILS RELATIVE PERCENT: 4.1 % (ref 0–6)
EPITHELIAL CELLS, UA: ABNORMAL /HPF
GFR AFRICAN AMERICAN: 46
GFR AFRICAN AMERICAN: 51
GFR NON-AFRICAN AMERICAN: 38 ML/MIN/1.73
GFR NON-AFRICAN AMERICAN: 42 ML/MIN/1.73
GLUCOSE BLD-MCNC: 112 MG/DL (ref 74–99)
GLUCOSE BLD-MCNC: 99 MG/DL (ref 74–99)
GLUCOSE URINE: NEGATIVE MG/DL
HCT VFR BLD CALC: 38.4 % (ref 34–48)
HEMOGLOBIN: 12.9 G/DL (ref 11.5–15.5)
IMMATURE GRANULOCYTES #: 0.03 E9/L
IMMATURE GRANULOCYTES %: 0.3 % (ref 0–5)
INR BLD: 6.1
KETONES, URINE: NEGATIVE MG/DL
LEUKOCYTE ESTERASE, URINE: ABNORMAL
LYMPHOCYTES ABSOLUTE: 0.82 E9/L (ref 1.5–4)
LYMPHOCYTES RELATIVE PERCENT: 9.6 % (ref 20–42)
MCH RBC QN AUTO: 34.9 PG (ref 26–35)
MCHC RBC AUTO-ENTMCNC: 33.6 % (ref 32–34.5)
MCV RBC AUTO: 103.8 FL (ref 80–99.9)
MONOCYTES ABSOLUTE: 0.5 E9/L (ref 0.1–0.95)
MONOCYTES RELATIVE PERCENT: 5.8 % (ref 2–12)
NEUTROPHILS ABSOLUTE: 6.81 E9/L (ref 1.8–7.3)
NEUTROPHILS RELATIVE PERCENT: 79.4 % (ref 43–80)
NITRITE, URINE: NEGATIVE
PDW BLD-RTO: 13.7 FL (ref 11.5–15)
PH UA: 6 (ref 5–9)
PLATELET # BLD: 114 E9/L (ref 130–450)
PMV BLD AUTO: 11.9 FL (ref 7–12)
POTASSIUM REFLEX MAGNESIUM: 5.3 MMOL/L (ref 3.5–5)
POTASSIUM SERPL-SCNC: 5.1 MMOL/L (ref 3.5–5)
PRO-BNP: 3204 PG/ML (ref 0–450)
PROTEIN UA: NEGATIVE MG/DL
PROTHROMBIN TIME: 72.4 SEC (ref 9.3–12.4)
RBC # BLD: 3.7 E12/L (ref 3.5–5.5)
RBC UA: ABNORMAL /HPF (ref 0–2)
SODIUM BLD-SCNC: 134 MMOL/L (ref 132–146)
SODIUM BLD-SCNC: 135 MMOL/L (ref 132–146)
SPECIFIC GRAVITY UA: 1.02 (ref 1–1.03)
TOTAL PROTEIN: 6.6 G/DL (ref 6.4–8.3)
TROPONIN: 0.02 NG/ML (ref 0–0.03)
UROBILINOGEN, URINE: 0.2 E.U./DL
WBC # BLD: 8.6 E9/L (ref 4.5–11.5)
WBC UA: ABNORMAL /HPF (ref 0–5)

## 2020-09-08 PROCEDURE — 99284 EMERGENCY DEPT VISIT MOD MDM: CPT

## 2020-09-08 PROCEDURE — 80048 BASIC METABOLIC PNL TOTAL CA: CPT

## 2020-09-08 PROCEDURE — 85610 PROTHROMBIN TIME: CPT

## 2020-09-08 PROCEDURE — 6360000002 HC RX W HCPCS: Performed by: EMERGENCY MEDICINE

## 2020-09-08 PROCEDURE — 71045 X-RAY EXAM CHEST 1 VIEW: CPT

## 2020-09-08 PROCEDURE — 97165 OT EVAL LOW COMPLEX 30 MIN: CPT

## 2020-09-08 PROCEDURE — 2580000003 HC RX 258: Performed by: EMERGENCY MEDICINE

## 2020-09-08 PROCEDURE — 2580000003 HC RX 258: Performed by: INTERNAL MEDICINE

## 2020-09-08 PROCEDURE — 85025 COMPLETE CBC W/AUTO DIFF WBC: CPT

## 2020-09-08 PROCEDURE — 99285 EMERGENCY DEPT VISIT HI MDM: CPT

## 2020-09-08 PROCEDURE — 6370000000 HC RX 637 (ALT 250 FOR IP): Performed by: INTERNAL MEDICINE

## 2020-09-08 PROCEDURE — 97161 PT EVAL LOW COMPLEX 20 MIN: CPT

## 2020-09-08 PROCEDURE — 93005 ELECTROCARDIOGRAM TRACING: CPT | Performed by: EMERGENCY MEDICINE

## 2020-09-08 PROCEDURE — 2060000000 HC ICU INTERMEDIATE R&B

## 2020-09-08 PROCEDURE — 83880 ASSAY OF NATRIURETIC PEPTIDE: CPT

## 2020-09-08 PROCEDURE — 81001 URINALYSIS AUTO W/SCOPE: CPT

## 2020-09-08 PROCEDURE — 80053 COMPREHEN METABOLIC PANEL: CPT

## 2020-09-08 PROCEDURE — 93010 ELECTROCARDIOGRAM REPORT: CPT | Performed by: INTERNAL MEDICINE

## 2020-09-08 PROCEDURE — 84484 ASSAY OF TROPONIN QUANT: CPT

## 2020-09-08 PROCEDURE — 36415 COLL VENOUS BLD VENIPUNCTURE: CPT

## 2020-09-08 RX ORDER — SODIUM CHLORIDE 0.9 % (FLUSH) 0.9 %
10 SYRINGE (ML) INJECTION EVERY 12 HOURS SCHEDULED
Status: DISCONTINUED | OUTPATIENT
Start: 2020-09-08 | End: 2020-09-10 | Stop reason: SDUPTHER

## 2020-09-08 RX ORDER — POLYETHYLENE GLYCOL 3350 17 G/17G
17 POWDER, FOR SOLUTION ORAL DAILY PRN
Status: DISCONTINUED | OUTPATIENT
Start: 2020-09-08 | End: 2020-09-11 | Stop reason: HOSPADM

## 2020-09-08 RX ORDER — 0.9 % SODIUM CHLORIDE 0.9 %
1000 INTRAVENOUS SOLUTION INTRAVENOUS ONCE
Status: COMPLETED | OUTPATIENT
Start: 2020-09-08 | End: 2020-09-08

## 2020-09-08 RX ORDER — ONDANSETRON 2 MG/ML
4 INJECTION INTRAMUSCULAR; INTRAVENOUS EVERY 6 HOURS PRN
Status: DISCONTINUED | OUTPATIENT
Start: 2020-09-08 | End: 2020-09-10

## 2020-09-08 RX ORDER — ACETAMINOPHEN 650 MG/1
650 SUPPOSITORY RECTAL EVERY 6 HOURS PRN
Status: DISCONTINUED | OUTPATIENT
Start: 2020-09-08 | End: 2020-09-10

## 2020-09-08 RX ORDER — FENTANYL CITRATE 50 UG/ML
25 INJECTION, SOLUTION INTRAMUSCULAR; INTRAVENOUS ONCE
Status: COMPLETED | OUTPATIENT
Start: 2020-09-08 | End: 2020-09-08

## 2020-09-08 RX ORDER — SODIUM CHLORIDE 0.9 % (FLUSH) 0.9 %
10 SYRINGE (ML) INJECTION PRN
Status: DISCONTINUED | OUTPATIENT
Start: 2020-09-08 | End: 2020-09-10 | Stop reason: SDUPTHER

## 2020-09-08 RX ORDER — CHOLECALCIFEROL (VITAMIN D3) 125 MCG
500 CAPSULE ORAL DAILY
COMMUNITY
End: 2021-06-16 | Stop reason: SDUPTHER

## 2020-09-08 RX ORDER — FAMOTIDINE 20 MG/1
20 TABLET, FILM COATED ORAL DAILY
Status: DISCONTINUED | OUTPATIENT
Start: 2020-09-08 | End: 2020-09-11 | Stop reason: HOSPADM

## 2020-09-08 RX ORDER — ACETAMINOPHEN 325 MG/1
650 TABLET ORAL EVERY 6 HOURS PRN
Status: DISCONTINUED | OUTPATIENT
Start: 2020-09-08 | End: 2020-09-10

## 2020-09-08 RX ORDER — WARFARIN SODIUM 2.5 MG/1
2.5 TABLET ORAL
COMMUNITY
End: 2020-12-07 | Stop reason: CLARIF

## 2020-09-08 RX ORDER — PHYTONADIONE 5 MG/1
2.5 TABLET ORAL ONCE
Status: COMPLETED | OUTPATIENT
Start: 2020-09-08 | End: 2020-09-08

## 2020-09-08 RX ORDER — SODIUM CHLORIDE 9 MG/ML
INJECTION, SOLUTION INTRAVENOUS CONTINUOUS
Status: DISCONTINUED | OUTPATIENT
Start: 2020-09-08 | End: 2020-09-09

## 2020-09-08 RX ORDER — DOCUSATE SODIUM 100 MG/1
300 CAPSULE, LIQUID FILLED ORAL
Status: DISCONTINUED | OUTPATIENT
Start: 2020-09-08 | End: 2020-09-11 | Stop reason: HOSPADM

## 2020-09-08 RX ORDER — PROMETHAZINE HYDROCHLORIDE 25 MG/1
12.5 TABLET ORAL EVERY 6 HOURS PRN
Status: DISCONTINUED | OUTPATIENT
Start: 2020-09-08 | End: 2020-09-10

## 2020-09-08 RX ADMIN — PHYTONADIONE 2.5 MG: 5 TABLET ORAL at 18:00

## 2020-09-08 RX ADMIN — METOPROLOL TARTRATE 25 MG: 25 TABLET, FILM COATED ORAL at 20:32

## 2020-09-08 RX ADMIN — FENTANYL CITRATE 25 MCG: 50 INJECTION, SOLUTION INTRAMUSCULAR; INTRAVENOUS at 14:33

## 2020-09-08 RX ADMIN — SODIUM CHLORIDE: 9 INJECTION, SOLUTION INTRAVENOUS at 17:23

## 2020-09-08 RX ADMIN — SODIUM CHLORIDE 1000 ML: 9 INJECTION, SOLUTION INTRAVENOUS at 11:29

## 2020-09-08 RX ADMIN — DOCUSATE SODIUM 300 MG: 100 CAPSULE, LIQUID FILLED ORAL at 18:01

## 2020-09-08 RX ADMIN — FAMOTIDINE 20 MG: 20 TABLET, FILM COATED ORAL at 18:01

## 2020-09-08 ASSESSMENT — ENCOUNTER SYMPTOMS
DIARRHEA: 0
COLOR CHANGE: 0
FACIAL SWELLING: 0
PHOTOPHOBIA: 0
CHEST TIGHTNESS: 0
RHINORRHEA: 0
SHORTNESS OF BREATH: 0
NAUSEA: 0
EYE PAIN: 0
ABDOMINAL DISTENTION: 0
COUGH: 0
CONSTIPATION: 0
EYE REDNESS: 0

## 2020-09-08 ASSESSMENT — PAIN SCALES - GENERAL: PAINLEVEL_OUTOF10: 5

## 2020-09-08 NOTE — PROGRESS NOTES
Physical Therapy    Facility/Department: [id number] [facility] EMERGENCY DEPARTMENT  Initial Assessment    NAME: Chen Kee  : 1928  MRN: [id number]    Date of Service: 2020      Patient Diagnosis(es): There were no encounter diagnoses. has a past medical history of Allergic rhinitis, Asthma, Atrial fibrillation ([facility] [address] CAD (coronary artery disease), Diabetes mellitus ([facility] [address] DJD (degenerative joint disease), Frequent UTI, Hearing loss, Hyperlipidemia, Hypertension, Liver disease, Myalgia, Osteopenia, Palmar erythema, Peptic reflux disease, Urinary incontinence, Vitamin B12 deficiency, and Wears glasses. has a past surgical history that includes Diagnostic Cardiac Cath Lab Procedure; Coronary angioplasty; Cardiac pacemaker placement; joint replacement (Left, 5/3/2013); pacemaker placement (); Colonoscopy; Hysterectomy; Fixation Kyphoplasty (5/21/15); pr partial hip replacement (Left, 2018); eye surgery (Bilateral); Appendectomy; Tonsillectomy; and vitrectomy (Left). Referring Provider:  Filomena Dominguez DO     Evaluating Therapist: Emilia Farmer PT      Room #:ER room e22  DIAGNOSIS: weakness, decreased appetite  Additional pertinent history: impaction fx R hip (per notes not acute)  PRECAUTIONS: falls    Social:  Pt lives alone in a 1 floor plan. Ambulates with ww, family uses w/c to assist pt on stairs. Has caregivers 11-6 and son comes at night to assist her to bed. Initial Evaluation  Date: 20 Treatment      Short Term/ Long Term   Goals   Was pt agreeable to Eval/treatment? yes     Does pt have pain?  R hip with mobility     Bed Mobility  Rolling: NT  Supine to sit: mod assist  Sit to supine: mod assist  Scooting: mod assist  Min assist   Transfers Sit to stand: min assist  Stand to sit: min assist  Stand pivot: min assist  supervision   Ambulation    15 feet with ww with min assist  75 feet with ww with supervision   Stair Negotiation  Ascended and descended  NT   N/A   LE strength     R LE 3-./5 L LE 3/5        balance      Fair with ww     AM-PAC Raw score               14/24         Pt is alert and Oriented   LE ROM: WFl  Sensation: intact  Edema: none  Endurance: fair       ASSESSMENT  Pt displays functional ability as noted in the objective portion of this evaluation. Patient education  Pt educated on PT objectives    Patient response to education:   Pt verbalized understanding Pt demonstrated skill Pt requires further education in this area   yes         ASSESSMENT:    Comments:  Ambulation limited by R LE pain    Pt's/ family goals   1. To get stronger    PLAN:    PT care will be provided in accordance with the objectives noted above. Exercises and functional mobility practice will be used as well as appropriate assistive devices or modalities to obtain goals. Patient and family education will also be administered as needed. Frequency of treatments: 2-5x/week x 5.days      Time out  1200      Evaluation Time includes thorough review of current medical information, gathering information on past medical history/social history and prior level of function, completion of standardized testing/informal observation of tasks, assessment of data and education on plan of care and goals.     CPT codes:  [x] Low Complexity PT evaluation 15991  [] Moderate Complexity PT evaluation 04113  [] High Complexity PT evaluation 33019  [] PT Re-evaluation 98596  [] Gait training 95125 minutes  [] Manual therapy 90111 minutes  [] Therapeutic activities 40726 minutes  [] Therapeutic exercises 58921 minutes  [] Neuromuscular reeducation 92007 minutes     [facility] PSYCHIATRY PT [id number]

## 2020-09-08 NOTE — CARE COORDINATION
Social Work/Transition of Care:  SW consulted to meet with pt and son Hoa Palencia introduced self and role. Pt is from home, Lives alone in a ranch style home, pt hs private duty care givers from 11 to \"Supper time\"  Seven days a week. Pt Son and daughter in law assist pt in the evening and place her in bed. PT uses a wheeled walker, lift chair and has a EMS system placed. Pt normal routine is to remain in bed until 11:00 when her caregivers arrive, pt then gets up and sits in her chair until evening. Pt son reports over the past week pt has had a decreased appetite which is causing her to be very weak. Pt family physician is Dr. Gina Castellano, Pt has a history with [facility] skilled Care. Plan is for PT/OT evaluation and SW will re-assess to assist with disposition.     Electronically signed by Tip Pérez on 1/8/3996 at 12:00 PM

## 2020-09-08 NOTE — PROGRESS NOTES
Occupational Therapy  OCCUPATIONAL THERAPY INITIAL EVALUATION      Date:2020  Patient Name: Suzan Dillon  MRN: [id number]  : 1928  Room:     Referring Provider: Dexter Brunson DO   Evaluating OT: Bienvenido [email or web address]    AM-PAC Daily Activity Raw Score: 15/24      Recommended Adaptive Equipment: Continue to assess. Diagnosis: No admission diagnoses are documented for this encounter. Patient presented to ED with complaints of weakness, decreased appetite; patient fell at home on 2020. Pertinent Medical History: a-fib, CAD, DM, DJD, HTN, osteopenia, urinary incontinence     Precautions: falls, skin integrity    Home Living: Patient lives alone in a one-floor home. Bathroom Setup: walk-in shower (with seat and grab bars) - patient sponge bathes (with assistance), as needed  Equipment Owned: walker, wheelchair    Prior Level of Function (PLOF): Per patient and patient's son, patient needed assistance with most ADLs and all IADLs. Patient was experiencing increased difficulty with functional transfers/mobility s/p recent fall at home. Patient has assistance from aides 7 days per week (11am - 6pm) for completion of ADLs and IADLs; family members assist patient in to bed each evening. Pain Level: Patient reported experiencing pain in her R LE, but did not rate/describe her pain. Cognition: Patient alert and oriented grossly. Fair command follow demonstrated. Memory: Fair   Sequencing: Fair   Problem Solving: Fair   Judgement/Safety: Fair    Functional Assessment:   Initial Eval Status  Date: 2020 Treatment Status  Date:  Short Term Goals   Feeding Setup     Grooming Min A  SBA  (seated/standing at sink)   UB Dressing Min A  Setup   LB Dressing Max A  Min A - with use of AE, as needed/appropriate   Bathing Max A  Min A - with use of AE/DME, as needed/appropriate   Toileting Max A  Min A   Bed Mobility  Supine-to-Sit: Mod A  Sit-to-Supine:  Mod A      Functional Transfers Sit-to-Stand: Min A   from edge of ED cart (elevated surface)  Independent   Functional Mobility Min A   (with walker) for short distance within patient's room  Mod I - with use of device, as needed/appropriate   Balance Sitting: Fair+  (at edge of ED cart)  Standing: Fair-  (with walker)  Fair+ dynamic standing balance during completion of ADLs and other functional tasks. Activity Tolerance Fair-  Limited secondary to R LE pain. Patient will demonstrate Good understanding and consistent implementation of energy conservation techniques and work simplification techniques into ADL/IADL routines. Visual/  Perceptual WFL  Patient wears glasses. N/A   B UE Strength 3+/5  Patient will demonstrate 4/5 B UE strength in order to maximize independence with ADLs and functional transfers. Long-Term Goal: Patient will increase functional independence to PLOF in order to allow patient to live in least restrictive environment. ROM: Additional Information:    R UE  AROM WFL Mild tremor noted - more in R hand than L hand, per patient report. L UE AROM WFL Mild tremor noted. Hand Dominance: Right    Hearing: Impaired  Sensation: Patient denied experiencing numbness/tingling in B UEs. Tone: WFL  Edema: No    Comments: Upon arrival, patient supine on ED cart with family member present. At end of session, patient supine on ED cart with family member present and all lines and tubes intact. Patient would benefit from continued skilled OT to increase safety and independence with completion of ADL/IADL tasks for functional independence and quality of life. Patient education provided regardin) safe transfer techniques. Patient indicated [address] understanding.     Eval Complexity: Low    Assessment of Current Deficits:   Functional Mobility [x]  ADLs [x] Strength [x]  Cognition []  Functional Transfers  [x] IADLs [x] Safety Awareness [x]  Endurance [x]  Fine Motor Coordination [] Balance [x] Vision/Perception [] Sensation []   Gross Motor Coordination [] ROM [] Delirium []                  Motor Control []    Plan of Care:   OT treatment to be provided 1-3x/week for 5-7 days PRN to address the following:  [x] ADL Re-Training/AE Recommendations  [x] Energy Conservation Techniques/Strategies      [x] Neuromuscular Re-Education     [x] Functional Transfer Training         [x] Functional Mobility Training          [] Cognitive Re-Training         [] Splinting/Positioning Needs           [x] Therapeutic Activity   [x] Therapeutic Exercise  [] Visual/Perceptual   [] Delirium Prevention/Treatment   [x] Positioning to Improve Functional [place], Safety, and Skin Integrity   [x] Patient and/or Family Education to Increase Safety and Functional [place]   [] Other:     Rehab Potential: Good for established goals. Patient / Family Goal: No goal stated. Patient and/or family were instructed on functional diagnosis, prognosis/goals, and OT plan of care. Demonstrated Fair understanding. Low complexity evaluation + 0 timed treatment minutes  Time Out: 1155    Evaluation time includes thorough review of current medical information, gathering information on past medical history/social history and prior level of function, completion of standardized testing/informal observation of tasks, assessment of data, and development of POC/Goals. Alison Estes OTR/L  License Number: [id number]

## 2020-09-08 NOTE — CARE COORDINATION
Social Work/Transition of Care:    SW followed up with pt and son provided resources based on pt pt/ot eval, pt would like to return home and follow up with Home Health PT/OT and dietician. CORTEZ attempted to set up outpatient Home Health and call agency that pt has used in the past from [place], pt son unable to remember the name of the company. Pt son agreeable to call pt PCP in order to arrange [address] once he is home to ensure appropriate agency is ordered. CORTEZ provided pt son with SW card if he is in need of additional assistance after discharge.     Electronically signed by Jorge Pan on 2/0/9420 at 12:54 PM

## 2020-09-08 NOTE — ED NOTES
Bed: 22  Expected date:   Expected time:   Means of arrival:   Comments:  NOT [address] RN  09/08/20 1019

## 2020-09-08 NOTE — TELEPHONE ENCOUNTER
Pt's son called office this morning stating pt fell on Sunday and he took her to [facility] ER. They did x-rays and CT scan. He said they told him they didn't find anything wrong. They told her to take Tylenol for pain. She takes Tylenol 500mg 2 tablets every 4 hours. That is not helping the pain. Pt has not eaten much in 3 days. Her INR was 4.1 at ER on Sunday. Nikhil Guerra said her BP was 116/65 and INR was 4.7 this morning. She hasn't taken Wrfarin since Saturday. He would like humera to see her today. I gave him an appt for a virtual visit with Maxim Richardson today at 1:00pm. He called office back and told Matilde Monterroso he is going to take her to ER because he can't wait all day.

## 2020-09-08 NOTE — ED PROVIDER NOTES
Patient is a 78-year-old female the presents to the emergency department due to complaint by family that she is only drinking Ensure and has had decreased appetite for last 2 days after a fall. She presented here originally on September 6 for a fall was worked up and found to have a compression fracture of the right femur and discharge. Patient states she still has some of the right hip pain only Hurts when she walks however she states that she has been drinking Ensure due to her decreased appetite and feels like she is doing okay with her being 80 with what she can do. Patient does not understand why her family sent her in here states she is doing just fine. She does have help at home multiple caregivers coming in daily to assist her with activities daily living. Patient has no complaints different from her previous visit however familial concern that brings her in today. The history is provided by the patient. No  was used. Leg pain is worsened by ambulating. Symptoms are improved by nothing    Denies any associated chest pain, loss of consciousness, falls, fatigue    Review of Systems   Constitutional: Positive for appetite change. Negative for activity change, chills, diaphoresis and fatigue. HENT: Negative for congestion, facial swelling, hearing loss and rhinorrhea. Eyes: Negative for photophobia, pain and redness. Respiratory: Negative for cough, chest tightness and shortness of breath. Cardiovascular: Negative for chest pain, palpitations and leg swelling. Gastrointestinal: Negative for abdominal distention, constipation, diarrhea and nausea. Genitourinary: Negative for difficulty urinating, dysuria, frequency and hematuria. Musculoskeletal: Positive for arthralgias. Negative for joint swelling and myalgias. Skin: Negative for color change, pallor and rash. Neurological: Negative for light-headedness, numbness and headaches.    Hematological: Negative for adenopathy. Physical Exam  Vitals signs and nursing note reviewed. Constitutional:       General: She is not in acute distress. Appearance: Normal appearance. She is well-developed. She is not ill-appearing. HENT:      Head: Normocephalic and atraumatic. Right Ear: Tympanic membrane normal.      Left Ear: Tympanic membrane normal.      Nose: Nose normal. No congestion. Mouth/Throat:      Mouth: Mucous membranes are moist.      Pharynx: Oropharynx is clear. Eyes:      Pupils: Pupils are equal, round, and reactive to light. Neck:      Musculoskeletal: Normal range of motion and neck supple. Cardiovascular:      Rate and Rhythm: Normal rate and regular rhythm. Pulmonary:      Effort: Pulmonary effort is normal. No respiratory distress. Breath sounds: Normal breath sounds. No wheezing or rales. Abdominal:      General: Bowel sounds are normal.      Palpations: Abdomen is soft. Tenderness: There is no abdominal tenderness. There is no guarding or rebound. Musculoskeletal:         General: Tenderness (Tenderness over the left hip mild in nature) present. Right lower leg: No edema. Left lower leg: No edema. Skin:     General: Skin is warm and dry. Neurological:      Mental Status: She is alert and oriented to person, place, and time. Cranial Nerves: No cranial nerve deficit. Coordination: Coordination normal.          Procedures     EKG: This EKG is signed and interpreted by me. Rate: 100  Rhythm: Atrial flutter  Interpretation: atrial fibrillation (chronic)  Comparison: changes compared to previous EKG       MDM  Number of Diagnoses or Management Options  Acute renal failure superimposed on chronic kidney disease, unspecified CKD stage, unspecified acute renal failure type [facility]):   Closed impacted fracture of right hip, initial encounter [facility]):    Hyperkalemia:   Supratherapeutic INR:   Diagnosis management comments: Patient presented due to concern by family that she was not eating appropriately since a fall she had last week. Patient was found to have acute on chronic kidney disease with hyperkalemia in addition to this her INR was found to be supratherapeutic despite the family stating that she has not taken her Coumadin for 3 days. Patient also has an ongoing close impacted fracture of the right hip for which she has been ambulating on with further difficulty. Patient will be admitted to the hospital for treatment of her supratherapeutic INR with acute kidney injury in addition to this it is requested that Ortho assess the patient's closed impacted fracture of the right hip for possible intervention. ED Course as of Sep 08 1615   Tue Sep 08, 2020   1052 ATTENDING PROVIDER ATTESTATION:     I have personally performed and/or participated in the history, exam, medical decision making, and procedures and agree with all pertinent clinical information unless otherwise noted. I have also reviewed and agree with the past medical, family and social history unless otherwise noted. I have discussed this patient in detail with the resident and provided the instruction and education regarding the evidence-based evaluation and treatment of weakness. History: This patient was seen here recently following a fall. Work-up at that time demonstrated subacute fracture of the right hip. She returns here today with her son who states that he is concerned that she has continued weakness and is not eating very well. She continues to complain of pain at the hip. No new fall. She does have several caretakers at home that her sister. She has been able to ambulate. My findings: Alda Kendrick is a 80 y.o. female whom is in no distress. Physical exam reveals she is alert and oriented. Heart is regular and borderline tachycardic. Lungs are coarse bilaterally. Abdomen soft nontender.   Extremities are intact with good range of motion, good distal pulses and capillary refill. Skin is warm and dry. She does have tenderness to palpation of the right lateral hip. No real exacerbation of her pain with range of motion in that hip. My plan: Symptomatic and supportive care. Labs. Discussed with social work. I did discuss options with the son. He would like her admitted to the hospital until she returns to her baseline strength and appetite. We discussed this probably would not be an option as is but, the possibility of some type of physical therapy may be appropriate. Both he and the patient refuses any consideration of an inpatient rehab center. Electronically signed by Eliza Sanchez DO on 9/8/20 at 10:52 AM EDT          [TG]   1240 Patient has been seen by PT OT. She would qualify for placement physical therapy however, she continues to refuse. She wants to use at home health service that she had used previously. Her  has discussed this with him. They have the name and phone number for the facility at home. Our  will fax the PTOT evaluation from today to the home health agency they provide the information for upon returning home. [TG]      ED Course User Index  [TG] Eliza Sanchez DO      ED Course as of Sep 09 0508   Tue Sep 08, 2020   1052 ATTENDING PROVIDER ATTESTATION:     I have personally performed and/or participated in the history, exam, medical decision making, and procedures and agree with all pertinent clinical information unless otherwise noted. I have also reviewed and agree with the past medical, family and social history unless otherwise noted. I have discussed this patient in detail with the resident and provided the instruction and education regarding the evidence-based evaluation and treatment of weakness. History: This patient was seen here recently following a fall. Work-up at that time demonstrated subacute fracture of the right hip.   She returns here today with her son who states that he is concerned that she has continued weakness and is not eating very well. She continues to complain of pain at the hip. No new fall. She does have several caretakers at home that her sister. She has been able to ambulate. My findings: Ginger Renee is a 80 y.o. female whom is in no distress. Physical exam reveals she is alert and oriented. Heart is regular and borderline tachycardic. Lungs are coarse bilaterally. Abdomen soft nontender. Extremities are intact with good range of motion, good distal pulses and capillary refill. Skin is warm and dry. She does have tenderness to palpation of the right lateral hip. No real exacerbation of her pain with range of motion in that hip. My plan: Symptomatic and supportive care. Labs. Discussed with social work. I did discuss options with the son. He would like her admitted to the hospital until she returns to her baseline strength and appetite. We discussed this probably would not be an option as is but, the possibility of some type of physical therapy may be appropriate. Both he and the patient refuses any consideration of an inpatient rehab center. Electronically signed by Emily Waterman DO on 9/8/20 at 10:52 AM EDT          [TG]   1240 Patient has been seen by PT OT. She would qualify for placement physical therapy however, she continues to refuse. She wants to use at home health service that she had used previously. Her  has discussed this with him. They have the name and phone number for the facility at home. Our  will fax the PTOT evaluation from today to the home health agency they provide the information for upon returning home.     [TG]      ED Course User Index  [TG] Emily Waterman, DO       --------------------------------------------- PAST HISTORY ---------------------------------------------  Past Medical History:  has a past medical history of Allergic rhinitis, Asthma, Atrial fibrillation - 7.30 E9/L    Immature Granulocytes # 0.03 E9/L    Lymphocytes Absolute 0.82 (L) 1.50 - 4.00 E9/L    Monocytes Absolute 0.50 0.10 - 0.95 E9/L    Eosinophils Absolute 0.35 0.05 - 0.50 E9/L    Basophils Absolute 0.07 0.00 - 0.20 E9/L   Comprehensive Metabolic Panel w/ Reflex to MG   Result Value Ref Range    Sodium 135 132 - 146 mmol/L    Potassium reflex Magnesium 5.3 (H) 3.5 - 5.0 mmol/L    Chloride 103 98 - 107 mmol/L    CO2 22 22 - 29 mmol/L    Anion Gap 10 7 - 16 mmol/L    Glucose 99 74 - 99 mg/dL    BUN 29 (H) 8 - 23 mg/dL    CREATININE 1.3 (H) 0.5 - 1.0 mg/dL    GFR Non-African American 38 >=60 mL/min/1.73    GFR African American 46     Calcium 9.4 8.6 - 10.2 mg/dL    Total Protein 6.6 6.4 - 8.3 g/dL    Alb 3.9 3.5 - 5.2 g/dL    Total Bilirubin 0.5 0.0 - 1.2 mg/dL    Alkaline Phosphatase 102 35 - 104 U/L    ALT 16 0 - 32 U/L    AST 25 0 - 31 U/L   Troponin   Result Value Ref Range    Troponin 0.02 0.00 - 0.03 ng/mL   Brain Natriuretic Peptide   Result Value Ref Range    Pro-BNP 3,204 (H) 0 - 450 pg/mL   Urinalysis, reflex to microscopic   Result Value Ref Range    Color, UA Yellow Straw/Yellow    Clarity, UA Clear Clear    Glucose, Ur Negative Negative mg/dL    Bilirubin Urine Negative Negative    Ketones, Urine Negative Negative mg/dL    Specific Gravity, UA 1.025 1.005 - 1.030    Blood, Urine Negative Negative    pH, UA 6.0 5.0 - 9.0    Protein, UA Negative Negative mg/dL    Urobilinogen, Urine 0.2 <2.0 E.U./dL    Nitrite, Urine Negative Negative    Leukocyte Esterase, Urine MODERATE (A) Negative   Protime-INR   Result Value Ref Range    Protime 72.4 (H) 9.3 - 12.4 sec    INR 6.1 (HH)    Microscopic Urinalysis   Result Value Ref Range    WBC, UA 1-3 0 - 5 /HPF    RBC, UA NONE 0 - 2 /HPF    Epithelial Cells, UA MANY /HPF    Bacteria, UA FEW (A) None Seen /HPF   Basic Metabolic Panel   Result Value Ref Range    Sodium 134 132 - 146 mmol/L    Potassium 5.1 (H) 3.5 - 5.0 mmol/L    Chloride 106 98 - 107 mmol/L -- --   09/08/20 2030 (!) 109/59 98 °F (36.7 °C) Oral 94 18 95 % -- --   09/08/20 1528 107/68 98.6 °F (37 °C) Oral 101 18 -- -- 142 lb (64.4 kg)   09/08/20 1430 104/69 98.1 °F (36.7 °C) -- 93 18 100 % -- --   09/08/20 1215 113/67 98 °F (36.7 °C) -- 90 18 93 % -- --   09/08/20 1027 118/72 97.9 °F (36.6 °C) Oral 97 20 95 % 5' 2\" (1.575 m) 152 lb (68.9 kg)       Oxygen Saturation Interpretation: Normal    ------------------------------------------ PROGRESS NOTES ------------------------------------------  Re-evaluation(s):  Time: 4508  Patients symptoms show no change  Repeat physical examination is not changed    Counseling:  I have spoken with the patient and discussed todays results, in addition to providing specific details for the plan of care and counseling regarding the diagnosis and prognosis. Their questions are answered at this time and they are agreeable with the plan of admission.    --------------------------------- ADDITIONAL PROVIDER NOTES ---------------------------------  Consultations:  Spoke with Dr. Marlin Nguyen. Discussed case. They will admit the patient. This patient's ED course included: a personal history and physicial examination, re-evaluation prior to disposition, multiple bedside re-evaluations, IV medications, cardiac monitoring, continuous pulse oximetry and complex medical decision making and emergency management    This patient has remained hemodynamically stable during their ED course. Diagnosis:  1. Supratherapeutic INR    2. Acute renal failure superimposed on chronic kidney disease, unspecified CKD stage, unspecified acute renal failure type ([facility] [address]    3. Hyperkalemia    4. Closed impacted fracture of right hip, initial encounter [facility])        Disposition:  Patient's disposition: Admit to telemetry  Patient's condition is stable.               Emmanuel Calderon DO  Resident  09/09/20 3760

## 2020-09-09 ENCOUNTER — APPOINTMENT (OUTPATIENT)
Dept: GENERAL RADIOLOGY | Age: 85
DRG: 481 | End: 2020-09-09
Payer: MEDICARE

## 2020-09-09 PROBLEM — S72.001A CLOSED RIGHT HIP FRACTURE, INITIAL ENCOUNTER (HCC): Status: ACTIVE | Noted: 2020-09-09

## 2020-09-09 PROBLEM — S72.451A SUPRACONDYLAR FRACTURE OF FEMUR, RIGHT, CLOSED, INITIAL ENCOUNTER (HCC): Status: RESOLVED | Noted: 2018-02-08 | Resolved: 2020-09-09

## 2020-09-09 PROBLEM — N18.30 CKD (CHRONIC KIDNEY DISEASE) STAGE 3, GFR 30-59 ML/MIN (HCC): Chronic | Status: ACTIVE | Noted: 2020-09-09

## 2020-09-09 PROBLEM — N17.9 AKI (ACUTE KIDNEY INJURY) (HCC): Status: RESOLVED | Noted: 2020-09-08 | Resolved: 2020-09-09

## 2020-09-09 LAB
ANION GAP SERPL CALCULATED.3IONS-SCNC: 9 MMOL/L (ref 7–16)
BASOPHILS ABSOLUTE: 0.09 E9/L (ref 0–0.2)
BASOPHILS RELATIVE PERCENT: 1.6 % (ref 0–2)
BUN BLDV-MCNC: 24 MG/DL (ref 8–23)
CALCIUM SERPL-MCNC: 8.7 MG/DL (ref 8.6–10.2)
CHLORIDE BLD-SCNC: 108 MMOL/L (ref 98–107)
CO2: 22 MMOL/L (ref 22–29)
CREAT SERPL-MCNC: 1.2 MG/DL (ref 0.5–1)
EOSINOPHILS ABSOLUTE: 0.48 E9/L (ref 0.05–0.5)
EOSINOPHILS RELATIVE PERCENT: 8.4 % (ref 0–6)
GFR AFRICAN AMERICAN: 51
GFR NON-AFRICAN AMERICAN: 42 ML/MIN/1.73
GLUCOSE BLD-MCNC: 77 MG/DL (ref 74–99)
HCT VFR BLD CALC: 34.6 % (ref 34–48)
HEMOGLOBIN: 11.7 G/DL (ref 11.5–15.5)
IMMATURE GRANULOCYTES #: 0.02 E9/L
IMMATURE GRANULOCYTES %: 0.4 % (ref 0–5)
INR BLD: 3.9
LYMPHOCYTES ABSOLUTE: 0.81 E9/L (ref 1.5–4)
LYMPHOCYTES RELATIVE PERCENT: 14.2 % (ref 20–42)
MCH RBC QN AUTO: 35.1 PG (ref 26–35)
MCHC RBC AUTO-ENTMCNC: 33.8 % (ref 32–34.5)
MCV RBC AUTO: 103.9 FL (ref 80–99.9)
MONOCYTES ABSOLUTE: 0.47 E9/L (ref 0.1–0.95)
MONOCYTES RELATIVE PERCENT: 8.3 % (ref 2–12)
NEUTROPHILS ABSOLUTE: 3.82 E9/L (ref 1.8–7.3)
NEUTROPHILS RELATIVE PERCENT: 67.1 % (ref 43–80)
PDW BLD-RTO: 13.8 FL (ref 11.5–15)
PLATELET # BLD: 103 E9/L (ref 130–450)
PMV BLD AUTO: 11.3 FL (ref 7–12)
POTASSIUM REFLEX MAGNESIUM: 5.2 MMOL/L (ref 3.5–5)
PROTHROMBIN TIME: 47.3 SEC (ref 9.3–12.4)
RBC # BLD: 3.33 E12/L (ref 3.5–5.5)
SODIUM BLD-SCNC: 139 MMOL/L (ref 132–146)
WBC # BLD: 5.7 E9/L (ref 4.5–11.5)

## 2020-09-09 PROCEDURE — 2580000003 HC RX 258: Performed by: INTERNAL MEDICINE

## 2020-09-09 PROCEDURE — 6370000000 HC RX 637 (ALT 250 FOR IP): Performed by: INTERNAL MEDICINE

## 2020-09-09 PROCEDURE — 85610 PROTHROMBIN TIME: CPT

## 2020-09-09 PROCEDURE — 2580000003 HC RX 258: Performed by: ORTHOPAEDIC SURGERY

## 2020-09-09 PROCEDURE — 2060000000 HC ICU INTERMEDIATE R&B

## 2020-09-09 PROCEDURE — 85025 COMPLETE CBC W/AUTO DIFF WBC: CPT

## 2020-09-09 PROCEDURE — 80048 BASIC METABOLIC PNL TOTAL CA: CPT

## 2020-09-09 PROCEDURE — 99222 1ST HOSP IP/OBS MODERATE 55: CPT | Performed by: ORTHOPAEDIC SURGERY

## 2020-09-09 PROCEDURE — 36415 COLL VENOUS BLD VENIPUNCTURE: CPT

## 2020-09-09 PROCEDURE — 73560 X-RAY EXAM OF KNEE 1 OR 2: CPT

## 2020-09-09 PROCEDURE — 73552 X-RAY EXAM OF FEMUR 2/>: CPT

## 2020-09-09 RX ORDER — SODIUM CHLORIDE 0.9 % (FLUSH) 0.9 %
10 SYRINGE (ML) INJECTION PRN
Status: DISCONTINUED | OUTPATIENT
Start: 2020-09-09 | End: 2020-09-10

## 2020-09-09 RX ORDER — OXYCODONE HYDROCHLORIDE AND ACETAMINOPHEN 5; 325 MG/1; MG/1
1 TABLET ORAL EVERY 4 HOURS PRN
Status: DISCONTINUED | OUTPATIENT
Start: 2020-09-09 | End: 2020-09-10

## 2020-09-09 RX ORDER — SODIUM CHLORIDE 0.9 % (FLUSH) 0.9 %
10 SYRINGE (ML) INJECTION EVERY 12 HOURS SCHEDULED
Status: DISCONTINUED | OUTPATIENT
Start: 2020-09-09 | End: 2020-09-10

## 2020-09-09 RX ORDER — SODIUM CHLORIDE 9 MG/ML
INJECTION, SOLUTION INTRAVENOUS CONTINUOUS
Status: DISCONTINUED | OUTPATIENT
Start: 2020-09-09 | End: 2020-09-10

## 2020-09-09 RX ORDER — OXYCODONE HYDROCHLORIDE AND ACETAMINOPHEN 5; 325 MG/1; MG/1
2 TABLET ORAL EVERY 4 HOURS PRN
Status: DISCONTINUED | OUTPATIENT
Start: 2020-09-09 | End: 2020-09-10

## 2020-09-09 RX ORDER — DEXTROSE AND SODIUM CHLORIDE 5; .45 G/100ML; G/100ML
INJECTION, SOLUTION INTRAVENOUS CONTINUOUS
Status: ACTIVE | OUTPATIENT
Start: 2020-09-09 | End: 2020-09-09

## 2020-09-09 RX ADMIN — DEXTROSE AND SODIUM CHLORIDE: 5; 450 INJECTION, SOLUTION INTRAVENOUS at 08:50

## 2020-09-09 RX ADMIN — PETROLATUM: 420 OINTMENT TOPICAL at 20:39

## 2020-09-09 RX ADMIN — METOPROLOL TARTRATE 25 MG: 25 TABLET, FILM COATED ORAL at 08:51

## 2020-09-09 RX ADMIN — FAMOTIDINE 20 MG: 20 TABLET, FILM COATED ORAL at 08:51

## 2020-09-09 RX ADMIN — SODIUM CHLORIDE, PRESERVATIVE FREE 10 ML: 5 INJECTION INTRAVENOUS at 21:43

## 2020-09-09 RX ADMIN — SODIUM CHLORIDE, PRESERVATIVE FREE 10 ML: 5 INJECTION INTRAVENOUS at 20:39

## 2020-09-09 RX ADMIN — METOPROLOL TARTRATE 25 MG: 25 TABLET, FILM COATED ORAL at 20:52

## 2020-09-09 RX ADMIN — OXYCODONE HYDROCHLORIDE AND ACETAMINOPHEN 1 TABLET: 5; 325 TABLET ORAL at 17:06

## 2020-09-09 RX ADMIN — DOCUSATE SODIUM 300 MG: 100 CAPSULE, LIQUID FILLED ORAL at 11:37

## 2020-09-09 ASSESSMENT — PAIN SCALES - GENERAL
PAINLEVEL_OUTOF10: 0
PAINLEVEL_OUTOF10: 5

## 2020-09-09 NOTE — CONSULTS
Department of Orthopedic Surgery  [facility] Magnus Yo MD  Consult      Reason for Consult:  Right hip fracture    Consulting physician: Dr Dennis Dia:                The patient is a 80 y.o. female who presents with right hip fracture. She relates that she was trying to walk from the kitchen into the living room when she tripped and fell landed onto her right side. She is unable to ambulate afterwards. She is a household ambulator with a walker. She lives alone. She does have visiting nursing at home. She is had a previous right distal femur fracture retrograde femoral nailing by Dr. Funmilayo Crespo in 2018. That same year she had a left hip hemiarthroplasty for fracture by . No other injuries reported at the time of fall. She was admitted for decreased ambulation. Her son was at the bedside during the patient encounter and all questions were answered. .    Past Medical History:        Diagnosis Date    Allergic rhinitis     Asthma     Atrial fibrillation (HCC)     CAD (coronary artery disease)     Diabetes mellitus (HCC)     diet controlled    DJD (degenerative joint disease)     Frequent UTI     Hearing loss     uses aid Right ear    Hyperlipidemia     Hypertension     Liver disease     resolved    Myalgia     Osteopenia     Palmar erythema     Peptic reflux disease     Urinary incontinence     Vitamin B12 deficiency     Wears glasses      Past Surgical History:        Procedure Laterality Date    APPENDECTOMY      CARDIAC PACEMAKER PLACEMENT      COLONOSCOPY      CORONARY ANGIOPLASTY      DIAGNOSTIC CARDIAC CATH LAB PROCEDURE      EYE SURGERY Bilateral     FIXATION KYPHOPLASTY  5/21/15    L1    HYSTERECTOMY      ovaries removed    JOINT REPLACEMENT Left 5/3/2013    total knee arthroplasty    [facility]ACEMENT  2006    [facility]    [place] PARTIAL HIP REPLACEMENT Left 6/21/2018    LEFT HIP HEMIARTHROPLASTY  ++MARIA DOLORES++ performed by Julio Chi MD at [facility] OR    TONSILLECTOMY      VITRECTOMY Left      Current Medications:   Current Facility-Administered Medications: white petrolatum ointment, , Topical, BID  oxyCODONE-acetaminophen (PERCOCET) 5-325 MG per tablet 1 tablet, 1 tablet, Oral, Q4H PRN **OR** oxyCODONE-acetaminophen (PERCOCET) 5-325 MG per tablet 2 tablet, 2 tablet, Oral, Q4H PRN  docusate sodium (COLACE) capsule 300 mg, 300 mg, Oral, Lunch  metoprolol tartrate (LOPRESSOR) tablet 25 mg, 25 mg, Oral, BID  famotidine (PEPCID) tablet 20 mg, 20 mg, Oral, Daily  sodium chloride flush 0.9 % injection 10 mL, 10 mL, Intravenous, 2 times per day  sodium chloride flush 0.9 % injection 10 mL, 10 mL, Intravenous, PRN  acetaminophen (TYLENOL) tablet 650 mg, 650 mg, Oral, Q6H PRN **OR** acetaminophen (TYLENOL) suppository 650 mg, 650 mg, Rectal, Q6H PRN  polyethylene glycol (GLYCOLAX) packet 17 g, 17 g, Oral, Daily PRN  promethazine (PHENERGAN) tablet 12.5 mg, 12.5 mg, Oral, Q6H PRN **OR** ondansetron (ZOFRAN) injection 4 mg, 4 mg, Intravenous, Q6H PRN  Allergies:  Augmentin [amoxicillin-pot clavulanate] and Crestor [rosuvastatin]    Social History:   TOBACCO:   reports that she has never smoked. She has never used smokeless tobacco.  ETOH:   reports no history of alcohol use. DRUGS:   reports no history of drug use.   ACTIVITIES OF DAILY LIVING:    OCCUPATION:    Family History:       Problem Relation Age of Onset    Diabetes Mother     Coronary Art Dis Mother     Breast Cancer Mother     Diabetes Father     Coronary Art Dis Father     Breast Cancer Sister        REVIEW OF SYSTEMS:  CONSTITUTIONAL:  negative  EYES:  negative  HEENT:  negative  RESPIRATORY:  asthma  CARDIOVASCULAR:  Afib, HTN, CAD  GASTROINTESTINAL:  PUD  GENITOURINARY:  negative  INTEGUMENT/BREAST:  negative  HEMATOLOGIC/LYMPHATIC:  negative  ALLERGIC/IMMUNOLOGIC:  negative  ENDOCRINE:  diabetes  MUSCULOSKELETAL:  negative  NEUROLOGICAL:  negative  BEHAVIOR/PSYCH:  negative    PHYSICAL EXAM: VITALS:  BP (!) 83/54   Pulse 94   Temp 97.3 °F (36.3 °C) (Oral)   Resp 16   Ht 5' 2\" (1.575 m)   Wt 145 lb (65.8 kg)   SpO2 94%   BMI 26.52 kg/m²   CONSTITUTIONAL:  awake, alert, cooperative, no apparent distress, and appears stated age  EYES:  Lids and lashes normal, pupils equal, round and reactive to light, extra ocular muscles intact, sclera clear, conjunctiva normal  ENT:  Normocephalic, without obvious abnormality, atraumatic, sinuses nontender on palpation, external ears without lesions, oral pharynx with moist mucus membranes, tonsils without erythema or exudates, gums normal and good dentition. NECK:  Supple, symmetrical, trachea midline, no adenopathy, thyroid symmetric, not enlarged and no tenderness, skin normal  NEUROLOGIC:  Awake, alert, oriented to name, place and time. Cranial nerves II-XII are grossly intact. Motor is 5 out of 5 bilaterally. Sensory is intact. MUSCULOSKELETAL:    Right lower extremity: Positive tenderness to palpation over the hip. Positive tenderness about the knee. 2+ pitting edema distally. Chronic lymphedema present. Deep peroneal and tibial nerve sensation intact light touch. Palpable dorsalis pedis pulse. Gastrocsoleus 2+ anterior and EHL 5/5. Limited knee range of motion secondary to arthritis and pain. Pain with hip internal and external rotation. Pain with passive hip flexion. DATA:    CBC:   Lab Results   Component Value Date    WBC 5.7 09/09/2020    RBC 3.33 09/09/2020    HGB 11.7 09/09/2020    HCT 34.6 09/09/2020    .9 09/09/2020    MCH 35.1 09/09/2020    MCHC 33.8 09/09/2020    RDW 13.8 09/09/2020     09/09/2020    MPV 11.3 09/09/2020     PT/INR:    Lab Results   Component Value Date    PROTIME 47.3 09/09/2020    INR 3.9 09/09/2020       Radiology Review: X-rays AP pelvis AP and lateral views of the right hip CT scan of the right hip as well as AP lateral of the femur and knee were reviewed.   This demonstrates a impacted right femoral neck fracture. There is a intramedullary retrograde nail in place. There are 3 screws distally. There is extensive degenerative changes present about the knee. IMPRESSION:  · Close displaced impacted right femoral neck fracture    PLAN:  Today's findings were explained to the son. She does have a impacted femoral neck fracture. Treatment options were explained including hospice, close reduction with screw fixation, and lastly hardware removal of the femoral nail and hemiarthroplasty. After review of the treatment options and discussion with the patient they were interested in proceeding with intramedullary screw fixation. He understands the risk of nonunion, hardware failure, and possible need for conversion to a hemiarthroplasty which would require hardware removal of the femoral nail. They voiced understanding. They would like to discuss this further with other family members which includes an orthopedic surgeon. In addition the patient as well as the son understand the increased risk of perioperative morbidity mortality given the patient's advanced age and extensive medical comorbidities. They also discussed that they might wish to consult with Dr. Gary Bhatt which they are encouraged to do so if they wish. I explained the risks, benefits, alternatives and complications of surgery with the patient including but not limited to the risks of death, possible damage to nerves, vessels, or tendons, possible infection, possible nonunion, possible malunion, leg length discrepancy and malrotation,  Deep Venous Thrombosis (DVT), Pulmonary Emboli (PE), post-operative DVT prophylaxis, possible hardware failure, possible need for hardware removal, stiffness, as well as the possible need further surgery and unanticipated complications. The patient voiced understanding and all questions were answered. The patient elected to proceed with surgical intervention.      Lon House  9/9/2020

## 2020-09-09 NOTE — PROGRESS NOTES
Wound / ostomy dept consulted for this Pt admitted with closed R hip fracture. The Pt fell at home. History includes: Asthma, A-fib, DM, CAD. The Pt has a skin tear on her R arm. Steri strips are intact. Cleansed with NSS then versatel applied. Sacrum is intact. Heels are intact but mushy. Recommend Versatel to skin tears, Aquaphor to buttocks , SOS precautions.

## 2020-09-09 NOTE — PROGRESS NOTES
Occupational Therapy  Patient treatment attempted this PM.  Nursing reports pt awaiting results of x-ray and ortho consult to determine if pt has fx. Will await results.   Hold therapy this PM.  Karen KUHN/NEELAM [id number]

## 2020-09-09 NOTE — DISCHARGE INSTR - COC
Continuity of Care Form    Patient Name: Tia Prader   :  1928  MRN:  [id number]    Admit date:  2020  Discharge date:  2020    Code Status Order: Full Code   Advance Directives:   Advance Care Flowsheet Documentation       Date/Time Healthcare Directive Type of Healthcare Directive Copy in [address] Agent's Name Healthcare Agent's Phone Number    20 1501  Yes, patient has an advance directive for healthcare treatment  Health care treatment directive  No, copy requested from family  Adult [facility]  [phone]            Admitting Physician:  Suzy Tang MD  PCP: Patty Whatley, DO    Discharging Nurse: Ben Shah 23 Unit/Room#: [place] [facility] Phone Number: [phone]    Emergency Contact:   Extended Emergency Contact Information  Primary Emergency Contact: [address] Phone: [phone]  Relation: Other  Secondary Emergency Contact: Fuad Gallagher  Address: [address] [place] [address] Phone: [phone]  Mobile Phone: [phone]  Relation: Child    Past Surgical History:  Past Surgical History:   Procedure Laterality Date    APPENDECTOMY      CARDIAC PACEMAKER PLACEMENT      COLONOSCOPY      CORONARY ANGIOPLASTY      DIAGNOSTIC CARDIAC CATH LAB PROCEDURE      EYE SURGERY Bilateral     FIXATION KYPHOPLASTY  5/21/15    L1    HYSTERECTOMY      ovaries removed    JOINT REPLACEMENT Left 5/3/2013    total knee arthroplasty    PAC[facility]AKER PLACEMENT      [facility]    [place] PARTIAL HIP REPLACEMENT Left 2018    LEFT HIP HEMIARTHROPLASTY  ++MARIA DOLORES++ performed by Elsie Cummings MD at [facility] 425 VITRECTOMY Left        Immunization History:   Immunization History   Administered Date(s) Administered    Influenza Virus Vaccine 2013, 2014, 10/13/2015, 2016, 2017, 2018    Influenza, High Dose (Fluzone 65 yrs and older) 02/03/2020    Tdap (Boostrix, Adacel) 09/06/2020       Active Problems:  Patient Active Problem List   Diagnosis Code    Cardiac pacemaker - [facility] [employer] SR  Z95.0    Hyperlipidemia with target LDL less than 100 E78.5    Essential hypertension, benign I10    CAD (coronary artery disease), native coronary artery I25.10    Chronic atrial fibrillation I48.20    [facility] S72.001A    CKD (chronic kidney disease) stage 3, GFR 30-59 ml/min ([facility]) N18.3       Isolation/Infection:   Isolation            No Isolation          Patient Infection Status       None to display            Nurse Assessment:  Last Vital Signs: /79   Pulse 98   Temp 98.4 °F (36.9 °C) (Oral)   Resp 18   Ht 5' 2\" (1.575 m)   Wt 145 lb (65.8 kg)   SpO2 94%   BMI 26.52 kg/m²     Last documented pain score (0-10 scale): Pain Level: 0  Last Weight:   Wt Readings from Last 1 Encounters:   09/09/20 145 lb (65.8 kg)     Mental Status:  disoriented and alert    IV Access:  - None    Nursing Mobility/ADLs:  Walking   Assisted  Transfer  Assisted  Bathing  Assisted  Dressing  Assisted  Toileting  Assisted  Feeding  Independent  Med Admin  Assisted  Med Delivery   whole    Wound Care Documentation and Therapy:  Incision 06/21/18 Hip Left (Active)   Number of days: 810       Wound 09/06/20 Arm Right; Lower skin tear from fall at home (Active)   Wound Skin Tear 09/06/20 2215   Dressing Status Clean;Dry; Intact 09/09/20 0030   Dressing Changed Changed/New 09/09/20 0030   Dressing/Treatment Steri-strips 09/09/20 0030   Wound Cleansed Rinsed/Irrigated with saline 09/09/20 0030   Wound Length (cm) 4 cm 09/08/20 1600   Wound Width (cm) 4 cm 09/08/20 1600   Wound Surface Area (cm^2) 16 cm^2 09/08/20 1600   Wound Assessment Other (Comment); Clean;Dry; Intact 09/09/20 0030   Drainage Amount Scant 09/09/20 0030   Drainage Description Sanguinous 09/09/20 0030   Odor None 09/09/20 9/11/2020 at 11:18 AM    PHYSICIAN SECTION    Prognosis: {Prognosis:6668057630:::0}    Condition at Discharge: Suzanna Zavaleta Patient Condition:[id number]:::0}    Rehab Potential (if transferring to Rehab): {Prognosis:8968365800:::0}    Recommended Labs or Other Treatments After Discharge: ***    Physician Certification: I certify the above information and transfer of Sarah Crowley  is necessary for the continuing treatment of the diagnosis listed and that she requires [place] for less 30 days. Update Admission H&P: {CHP DME Changes in HandP:[id number]:::0}    PHYSICIAN SIGNATURE:  Electronically signed by Bradly Milan MD on 9/9/2020 at 8:08 AM    Follow up with dr Kandy Owens in 1 week. Follow up with dr Joellen Mehta in 1-2 weeks.

## 2020-09-09 NOTE — H&P
[address] [facility]  Attending History and Physical      CHIEF COMPLAINT:  Right hip pain      HISTORY OF PRESENT ILLNESS:      The patient is a 80 y.o. female patient of dr Madiha Villegas who presents with complains of right hip pain. Patient fell down Saturday prior to presentation. She had pain in her hip since. She has been having trouble walking. She denied chest pain, shortness of breath, abdominal pain, nausea, vomiting, fevers, chills and diaphoresis. She has not been eating or drinking much at home. She usually ambulates with a walker. She is resting comfortably in bed.          Past Medical History:    Past Medical History:   Diagnosis Date    Allergic rhinitis     Asthma     Atrial fibrillation ([address]     CAD (coronary artery disease)     Diabetes mellitus (HCC)     diet controlled    DJD (degenerative joint disease)     Frequent UTI     Hearing loss     uses aid Right ear    Hyperlipidemia     Hypertension     Liver disease     resolved    Myalgia     Osteopenia     Palmar erythema     Peptic reflux disease     Urinary incontinence     Vitamin B12 deficiency     Wears glasses        Past Surgical History:    Past Surgical History:   Procedure Laterality Date    APPENDECTOMY      CARDIAC PACEMAKER PLACEMENT      COLONOSCOPY      CORONARY ANGIOPLASTY      DIAGNOSTIC CARDIAC CATH LAB PROCEDURE      EYE SURGERY Bilateral     FIXATION KYPHOPLASTY  5/21/15    L1    HYSTERECTOMY      ovaries removed    JOINT REPLACEMENT Left 5/3/2013    total knee arthroplasty    PACEMAKER PLACEMENT  2006    [facility]    [place] PARTIAL HIP REPLACEMENT Left 6/21/2018    LEFT HIP HEMIARTHROPLASTY  ++MARIA DOLORES++ performed by cL Bowles MD at [facility] 425 VITRECTOMY Left        Medications Prior to Admission:    Medications Prior to Admission: warfarin (COUMADIN) 2.5 MG tablet, Take 2.5 mg by mouth twice a week Sat and sun  vitamin B-12 (CYANOCOBALAMIN) 500 MCG tablet, Take 500 focal deficits. General physical deconditioning.     Breast: deferred  Rectal: deferred  Genitalia:  deferred    LABS:    CBC with Differential:    Lab Results   Component Value Date    WBC 5.7 09/09/2020    RBC 3.33 09/09/2020    HGB 11.7 09/09/2020    HCT 34.6 09/09/2020     09/09/2020    .9 09/09/2020    MCH 35.1 09/09/2020    MCHC 33.8 09/09/2020    RDW 13.8 09/09/2020    NRBC 0.0 03/15/2018    SEGSPCT 51 05/14/2013    LYMPHOPCT 14.2 09/09/2020    LYMPHOPCT 31.2 03/15/2018    MONOPCT 8.3 09/09/2020    BASOPCT 1.6 09/09/2020    MONOSABS 0.47 09/09/2020    LYMPHSABS 0.81 09/09/2020    EOSABS 0.48 09/09/2020    BASOSABS 0.09 09/09/2020     CMP:    Lab Results   Component Value Date     09/09/2020    K 5.2 09/09/2020     09/09/2020    CO2 22 09/09/2020    BUN 24 09/09/2020    CREATININE 1.2 09/09/2020    GFRAA 51 09/09/2020    LABGLOM 42 09/09/2020    LABGLOM 48 03/15/2018    GLUCOSE 77 09/09/2020    GLUCOSE 78 03/15/2018    PROT 6.6 09/08/2020    LABALBU 3.9 09/08/2020    LABALBU 3.9 03/15/2018    CALCIUM 8.7 09/09/2020    BILITOT 0.5 09/08/2020    ALKPHOS 102 09/08/2020    AST 25 09/08/2020    ALT 16 09/08/2020     BMP:    Lab Results   Component Value Date     09/09/2020    K 5.2 09/09/2020     09/09/2020    CO2 22 09/09/2020    BUN 24 09/09/2020    LABALBU 3.9 09/08/2020    LABALBU 3.9 03/15/2018    CREATININE 1.2 09/09/2020    CALCIUM 8.7 09/09/2020    GFRAA 51 09/09/2020    LABGLOM 42 09/09/2020    LABGLOM 48 03/15/2018    GLUCOSE 77 09/09/2020    GLUCOSE 78 03/15/2018     Magnesium:    Lab Results   Component Value Date    MG 1.5 02/10/2018     Phosphorus:  No results found for: PHOS  PT/INR:    Lab Results   Component Value Date    PROTIME 47.3 09/09/2020    INR 3.9 09/09/2020     PTT:    Lab Results   Component Value Date    APTT 40.5 06/19/2018   [APTT}  Troponin:    Lab Results   Component Value Date    TROPONINI 0.02 09/08/2020     Last 3 Troponin:    Lab Results Component Value Date    TROPONINI 0.02 09/08/2020    TROPONINI 0.02 09/06/2020    TROPONINI 0.07 02/20/2015     U/A:    Lab Results   Component Value Date    NITRITE positive 08/18/2020    COLORU Yellow 09/08/2020    PROTEINU Negative 09/08/2020    PHUR 6.0 09/08/2020    WBCUA 1-3 09/08/2020    RBCUA NONE 09/08/2020    BACTERIA FEW 09/08/2020    CLARITYU Clear 09/08/2020    SPECGRAV 1.025 09/08/2020    LEUKOCYTESUR MODERATE 09/08/2020    UROBILINOGEN 0.2 09/08/2020    BILIRUBINUR Negative 09/08/2020    BILIRUBINUR negative 08/18/2020    BLOODU Negative 09/08/2020    GLUCOSEU Negative 09/08/2020     HgBA1c:  No results found for: LABA1C  FLP:    Lab Results   Component Value Date    TRIG 117 06/25/2018    HDL 32 06/25/2018    LDLCALC 62 06/25/2018    LABVLDL 23 06/25/2018     TSH:    Lab Results   Component Value Date    TSH 2.230 06/25/2018       ASSESSMENT:      Patient Active Problem List   Diagnosis    Cardiac pacemaker - [facility] [employer] SR     Hyperlipidemia with target LDL less than 100    Essential hypertension, benign    CAD (coronary artery disease), native coronary artery    Chronic atrial fibrillation    Closed right [facility]    CKD (chronic kidney disease) stage 3, GFR 30-59 ml/min ([facility])         PLAN:    Stable. Continue aggressive lipid therapy  Blood pressure ok, continue current medications  Continue medical management of ASCAD. Rate controlled. INR elevated. Hold coumadin. Ortho evaluation. Renal function at baseline.   Pt/Ot evaluations for discharge planning    Jessica Deleon MD  10:12 AM  9/9/2020

## 2020-09-09 NOTE — H&P
History and Physical    Patient:  Mg Trujillo 80 y.o. female MRN: [id number]     Date of Service: 9/9/2020  Current hospitalization: 9/8/2020-9/9/2020      Chief complaint: had concerns including Other (loss of appetite ) and Hip Pain (right hip pain ). History of Present Illness   The patient is a 80 y.o. female presenting 9/8/20 for decreased appetite. Family felt that she has not been eating and continues to complain of pain prompting them to bring her in. She recently had a fall and presented 9/6 with R upper hip pain. She didn't report LOC at that time. R hip fracture of unknown date noted. Workup largely negative at that time. INR 4.1   On this visit, noted to have INR 3.9 and BNP 3.2k. family noted that pt has been on antibiotic for last few days. Pt lives at [place] and receives help during the day with meal prep and ambulation during the day. At night she ambulates to the bathroom by herself. PMHx: a fib w /pacemaker paced CAD, HTN, CKd, HLD. Today, she has no complaints and reports her pain is better        REVIEW OF SYSTEMS:    · Constitutional: No fever, no chills, no change in weight; good appetite  · HEENT: No blurred vision, no ear problems, no sore throat, no rhinorrhea. · Respiratory: No cough, no sputum production, no pleuritic chest pain, no shortness of breath  · Cardiology: No angina, no dyspnea on exertion, no paroxysmal nocturnal dyspnea, no orthopnea, no palpitation, no leg swelling. · Gastroenterology: No dysphagia, no reflux; no abdominal pain, no nausea or vomiting; no constipation or diarrhea.  No hematochezia   · Genitourinary: No dysuria, no frequency, hesitancy;   · Musculoskeletal: no joint pain, no myalgia,   · Neurology: no focal weakness in extremities, no slurred speech, no double vision, no tingling or numbness sensation  · Endocrinology: no temperature intolerance, no polydipsia or polyuria  · Hematology: no bruising, no lymphadenopathy  · Skin: no skin changes noticed by patient  · Psychology: no depressed mood, no suicidal ideation      Past Medical History:      Diagnosis Date    Allergic rhinitis     Asthma     [facility] ([facility]     CAD (coronary artery disease)     Diabetes mellitus (HCC)     diet controlled    DJD (degenerative joint disease)     Frequent UTI     Hearing loss     uses aid Right ear    Hyperlipidemia     Hypertension     Liver disease     resolved    Myalgia     Osteopenia     Palmar erythema     Peptic reflux disease     Urinary incontinence     Vitamin B12 deficiency     Wears glasses        Past Surgical History:        Procedure Laterality Date    APPENDECTOMY      CARDIAC PACEMAKER PLACEMENT      COLONOSCOPY      CORONARY ANGIOPLASTY      DIAGNOSTIC CARDIAC CATH LAB PROCEDURE      EYE SURGERY Bilateral     FIXATION KYPHOPLASTY  5/21/15    L1    HYSTERECTOMY      ovaries removed    JOINT REPLACEMENT Left 5/3/2013    total knee arthroplasty    PAC[facility]ER PLACEMENT  2006    [facility]    [place] PARTIAL HIP REPLACEMENT Left 6/21/2018    LEFT HIP HEMIARTHROPLASTY  ++MARIA DOLORES++ performed by Ruddy Mccauley MD at [place] [address] VITRECTOMY Left        Medications Prior to Admission:    Prior to Admission medications    Medication Sig Start Date End Date Taking?  Authorizing Provider   warfarin (COUMADIN) 2.5 MG tablet Take 2.5 mg by mouth twice a week Sat and sun   Yes Historical Provider, MD   vitamin B-12 (CYANOCOBALAMIN) 500 MCG tablet Take 500 mcg by mouth daily   Yes Historical Provider, MD   metoprolol tartrate (LOPRESSOR) 25 MG tablet take 1 tablet by mouth twice a day 5/14/20  Yes Sophia Liriano MD   docusate sodium (COLACE) 100 MG capsule Take 300 mg by mouth Daily with lunch    Yes Historical Provider, MD   fexofenadine (ALLEGRA ALLERGY) 180 MG tablet Take 180 mg by mouth Daily with supper    Yes Historical Provider, MD   ranitidine (ZANTAC) 150 MG tablet Take 150 mg by mouth 2 times daily  6/20/16  Yes Historical Provider, MD   oxybutynin (DITROPAN) 5 MG tablet Take 5 mg by mouth every morning    Yes Historical Provider, MD   warfarin (COUMADIN) 5 MG tablet Take 5 mg by mouth Five times weekly Monday, Tuesday, Wednesday, Thursday and Friday 8/4/20   Historical Provider, MD   acetaminophen (TYLENOL) 325 MG tablet Take 2 tablets by mouth every 6 hours as needed for Pain 6/23/18   Ammon Hanley MD   allopurinol (ZYLOPRIM) 300 MG tablet Take 300 mg by mouth daily as needed     Historical Provider, MD       Allergies:  Augmentin [amoxicillin-pot clavulanate] and Crestor [rosuvastatin]    Social History:   TOBACCO:  Never  ETOH: none    OTHER: none   OCCUPATION: n/a    Family History:       Problem Relation Age of Onset    Diabetes Mother     Coronary Art Dis Mother     Breast Cancer Mother     Diabetes Father     Coronary Art Dis Father     Breast Cancer Sister        Objective Findings     · Vitals: /79   Pulse 98   Temp 98.4 °F (36.9 °C) (Oral)   Resp 18   Ht 5' 2\" (1.575 m)   Wt 145 lb (65.8 kg)   SpO2 94%   BMI 26.52 kg/m²     PHYSICAL EXAM:    · General: Alert and orientedx3. Appropriate affect. · HEENT: normocephalic No lymphadenopathy. Supple without thyromegaly, trachea is midline. Hard of hearing   · Respiratory: equal and symmetric rise on inspiration, no rhonchi, rales, or wheezes. · Cardiology: regular rate and rhythm. 1/6 systolic murmur at RSB. · Abdominal: nondistended. tender to palpation in epigastric region. No masses, rebound, or guarding. · Genitourinary: no rashes or ulcers. · Musculoskeletal: no joint deformity   · Neurology: CN II-XII grossly intact. No numbness or tingling. · Vascular: bilateral edema present. Bilateral radial and pedal pulses present and equal bilaterally . · Skin: Warm and dry. no ecchymosis or lacerations noted. Bilateral lower extremity skin darkening present   · Extremities: no cyanosis or clubbing.  R arm resting tremor improved with directed movement.      Labs    Phosphate   No results found for: PHOS    Magnesium   MAGNESIUM:  --/22 (09/09 0415)    Lab Results   Component Value Date    INR 3.9 09/09/2020    PROTIME 47.3 (H) 09/09/2020       PTT   Lab Results   Component Value Date    APTT 40.5 (H) 06/19/2018        CMP  Recent Labs     09/08/20  1058 09/08/20  1717 09/09/20  0415    134 139   K 5.3* 5.1* 5.2*    106 108*   CO2 22 17* 22   BUN 29* 28* 24*   CREATININE 1.3* 1.2* 1.2*   GLUCOSE 99 112* 77   CALCIUM 9.4 8.8 8.7       CBC   Recent Labs     09/06/20  2205 09/08/20  1058 09/09/20  0415   WBC 9.8 8.6 5.7   RBC 3.92 3.70 3.33*   HGB 13.5 12.9 11.7   HCT 40.5 38.4 34.6   .3* 103.8* 103.9*   MCH 34.4 34.9 35.1*   MCHC 33.3 33.6 33.8   RDW 13.6 13.7 13.8    114* 103*   MPV 11.8 11.9 11.3       ABG  Admission on 09/08/2020   Component Date Value    Ventricular Rate 09/08/2020 100     Atrial Rate 09/08/2020 375     QRS Duration 09/08/2020 88     Q-T Interval 09/08/2020 364     QTc Calculation (Bazett) 09/08/2020 469     R Axis 09/08/2020 25     T Axis 09/08/2020 -84     WBC 09/08/2020 8.6     RBC 09/08/2020 3.70     Hemoglobin 09/08/2020 12.9     Hematocrit 09/08/2020 38.4     MCV 09/08/2020 103.8*    MCH 09/08/2020 34.9     MCHC 09/08/2020 33.6     RDW 09/08/2020 13.7     Platelets 08/80/9324 114*    MPV 09/08/2020 11.9     Neutrophils % 09/08/2020 79.4     Immature Granulocytes % 09/08/2020 0.3     Lymphocytes % 09/08/2020 9.6*    Monocytes % 09/08/2020 5.8     Eosinophils % 09/08/2020 4.1     Basophils % 09/08/2020 0.8     Neutrophils Absolute 09/08/2020 6.81     Immature Granulocytes # 09/08/2020 0.03     Lymphocytes Absolute 09/08/2020 0.82*    Monocytes Absolute 09/08/2020 0.50     Eosinophils Absolute 09/08/2020 0.35     Basophils Absolute 09/08/2020 0.07     Sodium 09/08/2020 135     Potassium reflex Magnesi* 09/08/2020 5.3*    Chloride 09/08/2020 103     CO2 09/08/2020 22     Anion Gap 09/08/2020 10     Glucose 09/08/2020 99     BUN 09/08/2020 29*    CREATININE 09/08/2020 1.3*    GFR Non- 09/08/2020 38     GFR  09/08/2020 46     Calcium 09/08/2020 9.4     Total Protein 09/08/2020 6.6     Alb 09/08/2020 3.9     Total Bilirubin 09/08/2020 0.5     Alkaline Phosphatase 09/08/2020 102     ALT 09/08/2020 16     AST 09/08/2020 25     Troponin 09/08/2020 0.02     Pro-BNP 09/08/2020 3,204*    Color, UA 09/08/2020 Yellow     Clarity, UA 09/08/2020 Clear     Glucose, Ur 09/08/2020 Negative     Bilirubin Urine 09/08/2020 Negative     Ketones, Urine 09/08/2020 Negative     Specific Gravity, UA 09/08/2020 1.025     Blood, Urine 09/08/2020 Negative     pH, UA 09/08/2020 6.0     Protein, UA 09/08/2020 Negative     Urobilinogen, Urine 09/08/2020 0.2     Nitrite, Urine 09/08/2020 Negative     Leukocyte Esterase, Urine 09/08/2020 MODERATE*    Protime 09/08/2020 72.4*    INR 09/08/2020 6.1*    WBC, UA 09/08/2020 1-3     RBC, UA 09/08/2020 NONE     Epithelial Cells, UA 09/08/2020 MANY     Bacteria, UA 09/08/2020 FEW*    Sodium 09/08/2020 134     Potassium 09/08/2020 5.1*    Chloride 09/08/2020 106     CO2 09/08/2020 17*    Anion Gap 09/08/2020 11     Glucose 09/08/2020 112*    BUN 09/08/2020 28*    CREATININE 09/08/2020 1.2*    GFR Non- 09/08/2020 42     GFR  09/08/2020 51     Calcium 09/08/2020 8.8     Protime 09/09/2020 47.3*    INR 09/09/2020 3.9     Sodium 09/09/2020 139     Potassium reflex Magnesi* 09/09/2020 5.2*    Chloride 09/09/2020 108*    CO2 09/09/2020 22     Anion Gap 09/09/2020 9     Glucose 09/09/2020 77     BUN 09/09/2020 24*    CREATININE 09/09/2020 1.2*    GFR Non- 09/09/2020 42     GFR  09/09/2020 51     Calcium 09/09/2020 8.7     WBC 09/09/2020 5.7     RBC 09/09/2020 3.33*    Hemoglobin 09/09/2020 11.7     Hematocrit 09/09/2020 34.6     MCV 09/09/2020 103.9*    MCH 09/09/2020 35.1*    MCHC 09/09/2020 33.8     RDW 09/09/2020 13.8     Platelets 48/30/1462 103*    MPV 09/09/2020 11.3     Neutrophils % 09/09/2020 67.1     Immature Granulocytes % 09/09/2020 0.4     Lymphocytes % 09/09/2020 14.2*    Monocytes % 09/09/2020 8.3     Eosinophils % 09/09/2020 8.4*    Basophils % 09/09/2020 1.6     Neutrophils Absolute 09/09/2020 3.82     Immature Granulocytes # 09/09/2020 0.02     Lymphocytes Absolute 09/09/2020 0.81*    Monocytes Absolute 09/09/2020 0.47     Eosinophils Absolute 09/09/2020 0.48     Basophils Absolute 09/09/2020 0.09    Admission on 09/06/2020, Discharged on 09/07/2020   Component Date Value    Protime 09/06/2020 50.0*    INR 09/06/2020 4.1     WBC 09/06/2020 9.8     RBC 09/06/2020 3.92     Hemoglobin 09/06/2020 13.5     Hematocrit 09/06/2020 40.5     MCV 09/06/2020 103.3*    MCH 09/06/2020 34.4     MCHC 09/06/2020 33.3     RDW 09/06/2020 13.6     Platelets 55/89/0405 130     MPV 09/06/2020 11.8     Neutrophils % 09/06/2020 77.2     Immature Granulocytes % 09/06/2020 0.5     Lymphocytes % 09/06/2020 13.8*    Monocytes % 09/06/2020 5.7     Eosinophils % 09/06/2020 2.2     Basophils % 09/06/2020 0.6     Neutrophils Absolute 09/06/2020 7.59*    Immature Granulocytes # 09/06/2020 0.05     Lymphocytes Absolute 09/06/2020 1.36*    Monocytes Absolute 09/06/2020 0.56     Eosinophils Absolute 09/06/2020 0.22     Basophils Absolute 09/06/2020 0.06     Sodium 09/06/2020 132     Potassium 09/06/2020 4.9     Chloride 09/06/2020 100     CO2 09/06/2020 21*    Anion Gap 09/06/2020 11     Glucose 09/06/2020 129*    BUN 09/06/2020 24*    CREATININE 09/06/2020 1.5*    GFR Non- 09/06/2020 32     GFR  09/06/2020 39     Calcium 09/06/2020 9.5     Ventricular Rate 09/06/2020 90     Atrial Rate 09/06/2020 120     QRS Duration 09/06/2020 84     Q-T Interval 09/06/2020 380     QTc Calculation (Bazett) 09/06/2020 464     R Axis 09/06/2020 5     T Axis 09/06/2020 -55     Troponin 09/06/2020 0.02          Lactate  Lab Results   Component Value Date    LACTA 2.0 02/19/2015       Cardiac  Lab Results   Component Value Date    CKTOTAL 736 (H) 02/20/2015    CKMB 16.0 (H) 02/20/2015    TROPONINI 0.02 09/08/2020        liver function tests   Lab Results   Component Value Date    ALT 16 09/08/2020    AST 25 09/08/2020    ALKPHOS 102 09/08/2020    BILITOT 0.5 09/08/2020        Protein/ Albumin  Lab Results   Component Value Date    LABALBU 3.9 09/08/2020        Imaging Studies:     Xr Chest Portable    Result Date: 9/8/2020  No interval change moderate hiatal hernia         Assessment and Plan       Pt is a 89F presenting for loss of appetite and R hip pain       1.change in appetite   -psychological vs gastroenterological cause.    -epigsatric tenderness without difficulty swallowing, nasuea, vomiting,  or bloating.    -increase pepcid to 40, f/u o/p   2. CHF    -metoprolol only at home.    -lasix 20 1x reevaluate following dose   - 5.2 cr1.2 probnp 3000   3. R femur fracture   -ortho consulted. Plans for intramedullary screw fixation    Unclear when?    -o/p DEXA    -pain control norco 5, reevaluate as necessary. 4.asymptomatic bacturia   -no need to continue abx w/o symptoms   5. Thrombocytopenia   -family concerned about malnutrition- possible cause    -concern for bleeding risk    -RBC nml.    -platelets 139, INR 3.9    -vit k given    -hold anticoagulation for now. Recheck INR  6. GERD    -pepcid 20  7. R arm tremor    -resting tremor improved with intention suggestive of parkinsonian  Tremor vs benign essential tremor   8. PMH constipation    - no complaints during this admission, BM last night.  Does go 8-9 days w ithout bowel movments       PT/OT evaluation: ordered   DVT prophylaxis/ GI prophylaxis: none- bleeding risk concern warfarin   Diet: npo  Bowel reg:      docusate prn Fidel Esparza MS4  Attending physician: Dr. Misha Levin

## 2020-09-10 ENCOUNTER — ANESTHESIA EVENT (OUTPATIENT)
Dept: OPERATING ROOM | Age: 85
DRG: 481 | End: 2020-09-10
Payer: MEDICARE

## 2020-09-10 ENCOUNTER — APPOINTMENT (OUTPATIENT)
Dept: GENERAL RADIOLOGY | Age: 85
DRG: 481 | End: 2020-09-10
Payer: MEDICARE

## 2020-09-10 ENCOUNTER — ANESTHESIA (OUTPATIENT)
Dept: OPERATING ROOM | Age: 85
DRG: 481 | End: 2020-09-10
Payer: MEDICARE

## 2020-09-10 VITALS — OXYGEN SATURATION: 85 % | DIASTOLIC BLOOD PRESSURE: 81 MMHG | SYSTOLIC BLOOD PRESSURE: 129 MMHG

## 2020-09-10 LAB
INR BLD: 1.2
METER GLUCOSE: 67 MG/DL (ref 74–99)
PROTHROMBIN TIME: 13.6 SEC (ref 9.3–12.4)

## 2020-09-10 PROCEDURE — 0QS604Z REPOSITION RIGHT UPPER FEMUR WITH INTERNAL FIXATION DEVICE, OPEN APPROACH: ICD-10-PCS | Performed by: ORTHOPAEDIC SURGERY

## 2020-09-10 PROCEDURE — 3600000003 HC SURGERY LEVEL 3 BASE: Performed by: ORTHOPAEDIC SURGERY

## 2020-09-10 PROCEDURE — 2500000003 HC RX 250 WO HCPCS: Performed by: NURSE ANESTHETIST, CERTIFIED REGISTERED

## 2020-09-10 PROCEDURE — 2060000000 HC ICU INTERMEDIATE R&B

## 2020-09-10 PROCEDURE — 2720000010 HC SURG SUPPLY STERILE: Performed by: ORTHOPAEDIC SURGERY

## 2020-09-10 PROCEDURE — C1713 ANCHOR/SCREW BN/BN,TIS/BN: HCPCS | Performed by: ORTHOPAEDIC SURGERY

## 2020-09-10 PROCEDURE — 2500000003 HC RX 250 WO HCPCS: Performed by: ORTHOPAEDIC SURGERY

## 2020-09-10 PROCEDURE — 2580000003 HC RX 258: Performed by: ORTHOPAEDIC SURGERY

## 2020-09-10 PROCEDURE — 7100000001 HC PACU RECOVERY - ADDTL 15 MIN: Performed by: ORTHOPAEDIC SURGERY

## 2020-09-10 PROCEDURE — 3700000001 HC ADD 15 MINUTES (ANESTHESIA): Performed by: ORTHOPAEDIC SURGERY

## 2020-09-10 PROCEDURE — 2580000003 HC RX 258: Performed by: STUDENT IN AN ORGANIZED HEALTH CARE EDUCATION/TRAINING PROGRAM

## 2020-09-10 PROCEDURE — 73502 X-RAY EXAM HIP UNI 2-3 VIEWS: CPT

## 2020-09-10 PROCEDURE — C1769 GUIDE WIRE: HCPCS | Performed by: ORTHOPAEDIC SURGERY

## 2020-09-10 PROCEDURE — 85610 PROTHROMBIN TIME: CPT

## 2020-09-10 PROCEDURE — 6360000002 HC RX W HCPCS: Performed by: NURSE ANESTHETIST, CERTIFIED REGISTERED

## 2020-09-10 PROCEDURE — 2580000003 HC RX 258: Performed by: INTERNAL MEDICINE

## 2020-09-10 PROCEDURE — 2709999900 HC NON-CHARGEABLE SUPPLY: Performed by: ORTHOPAEDIC SURGERY

## 2020-09-10 PROCEDURE — 36415 COLL VENOUS BLD VENIPUNCTURE: CPT

## 2020-09-10 PROCEDURE — 6370000000 HC RX 637 (ALT 250 FOR IP): Performed by: INTERNAL MEDICINE

## 2020-09-10 PROCEDURE — 7100000000 HC PACU RECOVERY - FIRST 15 MIN: Performed by: ORTHOPAEDIC SURGERY

## 2020-09-10 PROCEDURE — 6360000002 HC RX W HCPCS: Performed by: ORTHOPAEDIC SURGERY

## 2020-09-10 PROCEDURE — 3209999900 FLUORO FOR SURGICAL PROCEDURES

## 2020-09-10 PROCEDURE — 6360000002 HC RX W HCPCS: Performed by: STUDENT IN AN ORGANIZED HEALTH CARE EDUCATION/TRAINING PROGRAM

## 2020-09-10 PROCEDURE — 6370000000 HC RX 637 (ALT 250 FOR IP): Performed by: STUDENT IN AN ORGANIZED HEALTH CARE EDUCATION/TRAINING PROGRAM

## 2020-09-10 PROCEDURE — 3700000000 HC ANESTHESIA ATTENDED CARE: Performed by: ORTHOPAEDIC SURGERY

## 2020-09-10 PROCEDURE — 6360000002 HC RX W HCPCS: Performed by: ANESTHESIOLOGY

## 2020-09-10 PROCEDURE — 3600000013 HC SURGERY LEVEL 3 ADDTL 15MIN: Performed by: ORTHOPAEDIC SURGERY

## 2020-09-10 PROCEDURE — 82962 GLUCOSE BLOOD TEST: CPT

## 2020-09-10 PROCEDURE — 2580000003 HC RX 258

## 2020-09-10 PROCEDURE — 27236 TREAT THIGH FRACTURE: CPT | Performed by: ORTHOPAEDIC SURGERY

## 2020-09-10 DEVICE — WASHER ORTH DIA13MM FOR CANN SCR: Type: IMPLANTABLE DEVICE | Site: HIP | Status: FUNCTIONAL

## 2020-09-10 DEVICE — SCREW BNE L70MM DIA6.5MM THRD L16MM S STL CANN HEX SOCK: Type: IMPLANTABLE DEVICE | Site: HIP | Status: FUNCTIONAL

## 2020-09-10 DEVICE — SCREW BNE L75MM DIA6.5MM THRD L16MM S STL CANN HEX SOCK: Type: IMPLANTABLE DEVICE | Site: HIP | Status: FUNCTIONAL

## 2020-09-10 DEVICE — IMPLANTABLE DEVICE: Type: IMPLANTABLE DEVICE | Site: HIP | Status: FUNCTIONAL

## 2020-09-10 RX ORDER — PROMETHAZINE HYDROCHLORIDE 25 MG/ML
12.5 INJECTION, SOLUTION INTRAMUSCULAR; INTRAVENOUS
Status: DISCONTINUED | OUTPATIENT
Start: 2020-09-10 | End: 2020-09-10

## 2020-09-10 RX ORDER — SODIUM CHLORIDE 0.9 % (FLUSH) 0.9 %
10 SYRINGE (ML) INJECTION PRN
Status: DISCONTINUED | OUTPATIENT
Start: 2020-09-10 | End: 2020-09-11 | Stop reason: HOSPADM

## 2020-09-10 RX ORDER — ONDANSETRON 2 MG/ML
4 INJECTION INTRAMUSCULAR; INTRAVENOUS EVERY 6 HOURS PRN
Status: DISCONTINUED | OUTPATIENT
Start: 2020-09-10 | End: 2020-09-11 | Stop reason: HOSPADM

## 2020-09-10 RX ORDER — ONDANSETRON 2 MG/ML
INJECTION INTRAMUSCULAR; INTRAVENOUS PRN
Status: DISCONTINUED | OUTPATIENT
Start: 2020-09-10 | End: 2020-09-10 | Stop reason: SDUPTHER

## 2020-09-10 RX ORDER — ACETAMINOPHEN 325 MG/1
650 TABLET ORAL EVERY 6 HOURS
Status: DISCONTINUED | OUTPATIENT
Start: 2020-09-10 | End: 2020-09-11 | Stop reason: HOSPADM

## 2020-09-10 RX ORDER — PROPOFOL 10 MG/ML
INJECTION, EMULSION INTRAVENOUS CONTINUOUS PRN
Status: DISCONTINUED | OUTPATIENT
Start: 2020-09-10 | End: 2020-09-10 | Stop reason: SDUPTHER

## 2020-09-10 RX ORDER — MORPHINE SULFATE 4 MG/ML
4 INJECTION, SOLUTION INTRAMUSCULAR; INTRAVENOUS
Status: DISCONTINUED | OUTPATIENT
Start: 2020-09-10 | End: 2020-09-11 | Stop reason: HOSPADM

## 2020-09-10 RX ORDER — OXYCODONE HYDROCHLORIDE 5 MG/1
10 TABLET ORAL EVERY 4 HOURS PRN
Status: DISCONTINUED | OUTPATIENT
Start: 2020-09-10 | End: 2020-09-11 | Stop reason: HOSPADM

## 2020-09-10 RX ORDER — LIDOCAINE HYDROCHLORIDE 20 MG/ML
INJECTION, SOLUTION EPIDURAL; INFILTRATION; INTRACAUDAL; PERINEURAL PRN
Status: DISCONTINUED | OUTPATIENT
Start: 2020-09-10 | End: 2020-09-10 | Stop reason: SDUPTHER

## 2020-09-10 RX ORDER — PROMETHAZINE HYDROCHLORIDE 25 MG/1
12.5 TABLET ORAL EVERY 6 HOURS PRN
Status: DISCONTINUED | OUTPATIENT
Start: 2020-09-10 | End: 2020-09-11 | Stop reason: HOSPADM

## 2020-09-10 RX ORDER — DEXTROSE MONOHYDRATE 25 G/50ML
12.5 INJECTION, SOLUTION INTRAVENOUS ONCE
Status: COMPLETED | OUTPATIENT
Start: 2020-09-10 | End: 2020-09-10

## 2020-09-10 RX ORDER — MORPHINE SULFATE 2 MG/ML
1 INJECTION, SOLUTION INTRAMUSCULAR; INTRAVENOUS EVERY 5 MIN PRN
Status: DISCONTINUED | OUTPATIENT
Start: 2020-09-10 | End: 2020-09-10

## 2020-09-10 RX ORDER — PROPOFOL 10 MG/ML
INJECTION, EMULSION INTRAVENOUS PRN
Status: DISCONTINUED | OUTPATIENT
Start: 2020-09-10 | End: 2020-09-10 | Stop reason: SDUPTHER

## 2020-09-10 RX ORDER — SODIUM CHLORIDE 9 MG/ML
INJECTION, SOLUTION INTRAVENOUS CONTINUOUS
Status: DISCONTINUED | OUTPATIENT
Start: 2020-09-10 | End: 2020-09-11

## 2020-09-10 RX ORDER — BUPIVACAINE HYDROCHLORIDE AND EPINEPHRINE 5; 5 MG/ML; UG/ML
INJECTION, SOLUTION EPIDURAL; INTRACAUDAL; PERINEURAL PRN
Status: DISCONTINUED | OUTPATIENT
Start: 2020-09-10 | End: 2020-09-10 | Stop reason: ALTCHOICE

## 2020-09-10 RX ORDER — MORPHINE SULFATE 2 MG/ML
2 INJECTION, SOLUTION INTRAMUSCULAR; INTRAVENOUS EVERY 5 MIN PRN
Status: DISCONTINUED | OUTPATIENT
Start: 2020-09-10 | End: 2020-09-10

## 2020-09-10 RX ORDER — MEPERIDINE HYDROCHLORIDE 25 MG/ML
12.5 INJECTION INTRAMUSCULAR; INTRAVENOUS; SUBCUTANEOUS EVERY 5 MIN PRN
Status: DISCONTINUED | OUTPATIENT
Start: 2020-09-10 | End: 2020-09-10

## 2020-09-10 RX ORDER — OXYCODONE HYDROCHLORIDE 5 MG/1
5 TABLET ORAL EVERY 4 HOURS PRN
Status: DISCONTINUED | OUTPATIENT
Start: 2020-09-10 | End: 2020-09-11 | Stop reason: HOSPADM

## 2020-09-10 RX ORDER — SODIUM CHLORIDE 0.9 % (FLUSH) 0.9 %
10 SYRINGE (ML) INJECTION EVERY 12 HOURS SCHEDULED
Status: DISCONTINUED | OUTPATIENT
Start: 2020-09-10 | End: 2020-09-11 | Stop reason: HOSPADM

## 2020-09-10 RX ORDER — DEXTROSE MONOHYDRATE 25 G/50ML
INJECTION, SOLUTION INTRAVENOUS
Status: COMPLETED
Start: 2020-09-10 | End: 2020-09-10

## 2020-09-10 RX ORDER — MORPHINE SULFATE 2 MG/ML
2 INJECTION, SOLUTION INTRAMUSCULAR; INTRAVENOUS
Status: DISCONTINUED | OUTPATIENT
Start: 2020-09-10 | End: 2020-09-11 | Stop reason: HOSPADM

## 2020-09-10 RX ORDER — SENNA AND DOCUSATE SODIUM 50; 8.6 MG/1; MG/1
1 TABLET, FILM COATED ORAL 2 TIMES DAILY
Status: DISCONTINUED | OUTPATIENT
Start: 2020-09-10 | End: 2020-09-11 | Stop reason: HOSPADM

## 2020-09-10 RX ORDER — SODIUM CHLORIDE 9 MG/ML
INJECTION, SOLUTION INTRAVENOUS CONTINUOUS
Status: DISCONTINUED | OUTPATIENT
Start: 2020-09-10 | End: 2020-09-10

## 2020-09-10 RX ORDER — OXYCODONE HYDROCHLORIDE AND ACETAMINOPHEN 5; 325 MG/1; MG/1
1 TABLET ORAL EVERY 6 HOURS PRN
Qty: 28 TABLET | Refills: 0 | Status: SHIPPED | OUTPATIENT
Start: 2020-09-10 | End: 2020-09-17

## 2020-09-10 RX ADMIN — PETROLATUM: 420 OINTMENT TOPICAL at 20:33

## 2020-09-10 RX ADMIN — ENOXAPARIN SODIUM 30 MG: 30 INJECTION SUBCUTANEOUS at 20:33

## 2020-09-10 RX ADMIN — MEPERIDINE HYDROCHLORIDE 12.5 MG: 25 INJECTION, SOLUTION INTRAMUSCULAR; INTRAVENOUS; SUBCUTANEOUS at 18:26

## 2020-09-10 RX ADMIN — PROPOFOL 20 MCG/KG/MIN: 10 INJECTION, EMULSION INTRAVENOUS at 16:42

## 2020-09-10 RX ADMIN — PETROLATUM: 420 OINTMENT TOPICAL at 08:58

## 2020-09-10 RX ADMIN — ACETAMINOPHEN 650 MG: 325 TABLET ORAL at 19:24

## 2020-09-10 RX ADMIN — DOCUSATE SODIUM 50 MG AND SENNOSIDES 8.6 MG 1 TABLET: 8.6; 5 TABLET, FILM COATED ORAL at 20:33

## 2020-09-10 RX ADMIN — DEXTROSE MONOHYDRATE 12.5 G: 25 INJECTION, SOLUTION INTRAVENOUS at 18:20

## 2020-09-10 RX ADMIN — METOPROLOL TARTRATE 25 MG: 25 TABLET, FILM COATED ORAL at 09:00

## 2020-09-10 RX ADMIN — SODIUM CHLORIDE, PRESERVATIVE FREE 10 ML: 5 INJECTION INTRAVENOUS at 09:01

## 2020-09-10 RX ADMIN — SODIUM CHLORIDE: 9 INJECTION, SOLUTION INTRAVENOUS at 15:24

## 2020-09-10 RX ADMIN — LIDOCAINE HYDROCHLORIDE 60 MG: 20 INJECTION, SOLUTION EPIDURAL; INFILTRATION; INTRACAUDAL; PERINEURAL at 16:30

## 2020-09-10 RX ADMIN — SODIUM CHLORIDE: 9 INJECTION, SOLUTION INTRAVENOUS at 19:26

## 2020-09-10 RX ADMIN — MORPHINE SULFATE 2 MG: 2 INJECTION, SOLUTION INTRAMUSCULAR; INTRAVENOUS at 23:54

## 2020-09-10 RX ADMIN — PROPOFOL 30 MG: 10 INJECTION, EMULSION INTRAVENOUS at 16:30

## 2020-09-10 RX ADMIN — FAMOTIDINE 20 MG: 20 TABLET, FILM COATED ORAL at 09:00

## 2020-09-10 RX ADMIN — PHENYLEPHRINE HYDROCHLORIDE 200 MCG: 10 INJECTION INTRAVENOUS at 16:55

## 2020-09-10 RX ADMIN — Medication 2 G: at 16:26

## 2020-09-10 RX ADMIN — PROPOFOL 10 MG: 10 INJECTION, EMULSION INTRAVENOUS at 16:34

## 2020-09-10 RX ADMIN — MORPHINE SULFATE 2 MG: 2 INJECTION, SOLUTION INTRAMUSCULAR; INTRAVENOUS at 20:24

## 2020-09-10 RX ADMIN — SODIUM CHLORIDE: 9 INJECTION, SOLUTION INTRAVENOUS at 05:36

## 2020-09-10 RX ADMIN — SODIUM CHLORIDE: 9 INJECTION, SOLUTION INTRAVENOUS at 08:00

## 2020-09-10 RX ADMIN — PHENYLEPHRINE HYDROCHLORIDE 100 MCG: 10 INJECTION INTRAVENOUS at 16:42

## 2020-09-10 RX ADMIN — PROPOFOL 30 MG: 10 INJECTION, EMULSION INTRAVENOUS at 16:39

## 2020-09-10 RX ADMIN — ONDANSETRON 4 MG: 2 INJECTION INTRAMUSCULAR; INTRAVENOUS at 16:55

## 2020-09-10 RX ADMIN — SODIUM CHLORIDE, PRESERVATIVE FREE 10 ML: 5 INJECTION INTRAVENOUS at 20:33

## 2020-09-10 RX ADMIN — METOPROLOL TARTRATE 25 MG: 25 TABLET, FILM COATED ORAL at 20:33

## 2020-09-10 ASSESSMENT — PULMONARY FUNCTION TESTS
PIF_VALUE: 1
PIF_VALUE: 0
PIF_VALUE: 1
PIF_VALUE: 2
PIF_VALUE: 0
PIF_VALUE: 0
PIF_VALUE: 1
PIF_VALUE: 2
PIF_VALUE: 1
PIF_VALUE: 3
PIF_VALUE: 1
PIF_VALUE: 2
PIF_VALUE: 1
PIF_VALUE: 1
PIF_VALUE: 0
PIF_VALUE: 1
PIF_VALUE: 0
PIF_VALUE: 1
PIF_VALUE: 4
PIF_VALUE: 1

## 2020-09-10 ASSESSMENT — PAIN SCALES - GENERAL
PAINLEVEL_OUTOF10: 0
PAINLEVEL_OUTOF10: 6
PAINLEVEL_OUTOF10: 0
PAINLEVEL_OUTOF10: 0
PAINLEVEL_OUTOF10: 4
PAINLEVEL_OUTOF10: 8
PAINLEVEL_OUTOF10: 6
PAINLEVEL_OUTOF10: 4
PAINLEVEL_OUTOF10: 8
PAINLEVEL_OUTOF10: 8

## 2020-09-10 ASSESSMENT — PAIN DESCRIPTION - DESCRIPTORS
DESCRIPTORS: ACHING
DESCRIPTORS: CRUSHING;HEADACHE
DESCRIPTORS: CONSTANT;CRUSHING;HEADACHE
DESCRIPTORS: ACHING

## 2020-09-10 ASSESSMENT — PAIN DESCRIPTION - FREQUENCY
FREQUENCY: CONTINUOUS

## 2020-09-10 ASSESSMENT — PAIN DESCRIPTION - LOCATION
LOCATION: HEAD
LOCATION: HIP;HEAD
LOCATION: LEG;NECK
LOCATION: LEG;NECK

## 2020-09-10 ASSESSMENT — PAIN DESCRIPTION - ORIENTATION
ORIENTATION: RIGHT;POSTERIOR
ORIENTATION: RIGHT;POSTERIOR
ORIENTATION: RIGHT

## 2020-09-10 ASSESSMENT — PAIN DESCRIPTION - PAIN TYPE
TYPE: ACUTE PAIN
TYPE: ACUTE PAIN

## 2020-09-10 ASSESSMENT — PAIN - FUNCTIONAL ASSESSMENT: PAIN_FUNCTIONAL_ASSESSMENT: 0-10

## 2020-09-10 NOTE — PROGRESS NOTES
IV team consulted for morning lab work. Attempted x2. Was able to gain new IV access but couldn't draw labs.

## 2020-09-10 NOTE — ANESTHESIA PRE PROCEDURE
Department of Anesthesiology  Preprocedure Note       Name:  Brittny Kumar   Age:  80 y.o.  :  1928                                          MRN:  [id number]         Date:  9/10/2020      Surgeon: Crystal Degree):  Thania Triana MD    Procedure: Procedure(s):  RIGHT HIP OPEN REDUCTION INTERNAL FIXATION (SYNTHES)+++REQ. AFTER 3:30PM+++    Medications prior to admission:   Prior to Admission medications    Medication Sig Start Date End Date Taking?  Authorizing Provider   warfarin (COUMADIN) 2.5 MG tablet Take 2.5 mg by mouth twice a week Sat and sun   Yes Historical Provider, MD   vitamin B-12 (CYANOCOBALAMIN) 500 MCG tablet Take 500 mcg by mouth daily   Yes Historical Provider, MD   metoprolol tartrate (LOPRESSOR) 25 MG tablet take 1 tablet by mouth twice a day 20  Yes Harish Marks MD   docusate sodium (COLACE) 100 MG capsule Take 300 mg by mouth Daily with lunch    Yes Historical Provider, MD   fexofenadine (ALLEGRA ALLERGY) 180 MG tablet Take 180 mg by mouth Daily with supper    Yes Historical Provider, MD   ranitidine (ZANTAC) 150 MG tablet Take 150 mg by mouth 2 times daily  16  Yes Historical Provider, MD   oxybutynin (DITROPAN) 5 MG tablet Take 5 mg by mouth every morning    Yes Historical Provider, MD   warfarin (COUMADIN) 5 MG tablet Take 5 mg by mouth Five times weekly Monday, Tuesday, Wednesday, Thursday and 20   Historical Provider, MD   acetaminophen (TYLENOL) 325 MG tablet Take 2 tablets by mouth every 6 hours as needed for Pain 18   Mónica Brito MD   allopurinol (ZYLOPRIM) 300 MG tablet Take 300 mg by mouth daily as needed     Historical Provider, MD       Current medications:    Current Facility-Administered Medications   Medication Dose Route Frequency Provider Last Rate Last Dose    0.9 % sodium chloride infusion   Intravenous Continuous Rojelio Sanchez MD 75 mL/hr at 09/10/20 0800      oxyCODONE-acetaminophen (PERCOCET) 5-325 MG per tablet 1 tablet  1 tablet Oral Q4H PRN Esther Tuttle MD   1 tablet at 09/09/20 1706    Or    oxyCODONE-acetaminophen (PERCOCET) 5-325 MG per tablet 2 tablet  2 tablet Oral Q4H PRN Esther Tuttle MD        sodium chloride flush 0.9 % injection 10 mL  10 mL Intravenous 2 times per day Zak Cooley MD   10 mL at 09/10/20 0901    sodium chloride flush 0.9 % injection 10 mL  10 mL Intravenous PRN Zak Cooley MD        ceFAZolin (ANCEF) 2 g in sterile water 20 mL IV syringe  2 g Intravenous See Admin Instructions Zak Cooley MD        white petrolatum ointment   Topical BID Esther Tuttle MD        docusate sodium (COLACE) capsule 300 mg  300 mg Oral Lunch Sunshine Paez MD   300 mg at 09/09/20 1137    metoprolol tartrate (LOPRESSOR) tablet 25 mg  25 mg Oral BID Sunshine Paez MD   25 mg at 09/10/20 0900    famotidine (PEPCID) tablet 20 mg  20 mg Oral Daily Sunshine Paez MD   20 mg at 09/10/20 0900    sodium chloride flush 0.9 % injection 10 mL  10 mL Intravenous 2 times per day Sunshine Paez MD   10 mL at 09/09/20 2143    sodium chloride flush 0.9 % injection 10 mL  10 mL Intravenous PRN Sunshine Paez MD        acetaminophen (TYLENOL) tablet 650 mg  650 mg Oral Q6H PRN Sunshine Paez MD        Or   [facility] acetaminophen (TYLENOL) suppository 650 mg  650 mg Rectal Q6H PRN Sunshine Paez MD        polyethylene glycol (GLYCOLAX) packet 17 g  17 g Oral Daily PRN Sunshine Paez MD        promethazine (PHENERGAN) tablet 12.5 mg  12.5 mg Oral Q6H PRN Sunshine Paez MD        Or    ondansetron [facility]) injection 4 mg  4 mg Intravenous Q6H PRN Sunshine Paez MD           Allergies:     Allergies   Allergen Reactions    Augmentin [Amoxicillin-Pot Clavulanate] Other (See Comments)     Stop kidney function    Crestor [Rosuvastatin]        Problem List:    Patient Active Problem List   Diagnosis Code    Cardiac pacemaker - [employer] SR  Z95.0    Hyperlipidemia with target LDL less than 100 E78.5    Essential hypertension, regurgitation. No hemodynamically significant aortic stenosis is present. Pulmonic Valve   The pulmonic valve was not well visualized. Pericardial Effusion   No evidence of pericardial effusion. Aorta   Aortic root dimension within normal limits. Conclusions      Summary   Mild concentric left ventricular hypertrophy   No wall motion abnormalities   Atrial fibrillation   Ejection fraction is visually estimated at 60-70%. The left atrium is severely dilated. Atrial fibrillation   RVSP is 41 mmHg. No hemodynamically significant aortic stenosis is present   Mild to moderate centrally directed mitral regurgitation      Chest XRAY 9/8/2020  FINDINGS:      Heart is enlarged. There is a moderate size hiatal hernia. There are    no acute infiltrates or effusions. CT right hip 9/6/2020     Impression         Impaction fracture of right femoral head and femoral neck. There is    some sclerosis at the impaction site suggesting that this might be    nonacute. A correlation with MRI or nuclear bone scan should be    considered         BMI:   Wt Readings from Last 3 Encounters:   09/10/20 140 lb 14.4 oz (63.9 kg)   09/06/20 159 lb (72.1 kg)   08/27/19 159 lb 1.6 oz (72.2 kg)     Body mass index is 25.77 kg/m².     CBC:   Lab Results   Component Value Date    WBC 5.7 09/09/2020    RBC 3.33 09/09/2020    HGB 11.7 09/09/2020    HCT 34.6 09/09/2020    .9 09/09/2020    RDW 13.8 09/09/2020     09/09/2020       CMP:   Lab Results   Component Value Date     09/09/2020    K 5.2 09/09/2020     09/09/2020    CO2 22 09/09/2020    BUN 24 09/09/2020    CREATININE 1.2 09/09/2020    GFRAA 51 09/09/2020    LABGLOM 42 09/09/2020    LABGLOM 48 03/15/2018    GLUCOSE 77 09/09/2020    GLUCOSE 78 03/15/2018    PROT 6.6 09/08/2020    CALCIUM 8.7 09/09/2020    BILITOT 0.5 09/08/2020    ALKPHOS 102 09/08/2020    AST 25 09/08/2020    ALT 16 09/08/2020       POC Tests: No results for input(s): POCGLU, POCNA, POCK, POCCL, POCBUN, POCHEMO, POCHCT in the last 72 hours. Coags:   Lab Results   Component Value Date    PROTIME 13.6 09/10/2020    INR 1.2 09/10/2020    APTT 40.5 06/19/2018       HCG (If Applicable): No results found for: PREGTESTUR, PREGSERUM, HCG, HCGQUANT     ABGs: No results found for: PHART, PO2ART, CZR0ECA, XGI6FGJ, BEART, S1MTFFMM     Type & Screen (If Applicable):  No results found for: LABABO, LABRH    Drug/Infectious Status (If Applicable):  No results found for: HIV, HEPCAB    COVID-19 Screening (If Applicable): No results found for: COVID19      Anesthesia Evaluation  Patient summary reviewed and Nursing notes reviewed no history of anesthetic complications:   Airway: Mallampati: III  TM distance: >3 FB   Neck ROM: full  Mouth opening: > = 3 FB Dental:      Comment: Patient denies loose. Pulmonary: breath sounds clear to auscultation  (+) asthma (patient states asthma has resolved.):                            Cardiovascular:    (+) hypertension:, valvular problems/murmurs (per ECHO): MR, pacemaker: pacemaker, past MI (heart attack in 2012): > 6 months, CAD:, dysrhythmias: atrial fibrillation, hyperlipidemia      ECG reviewed  Rhythm: irregular  Rate: normal  Echocardiogram reviewed         Beta Blocker:  Dose within 24 Hrs         Neuro/Psych:   Negative Neuro/Psych ROS              GI/Hepatic/Renal:   (+) hiatal hernia (per chest xray), GERD: no interval change, renal disease (CKD; frequent UTI):,           Endo/Other:    (+) Diabetes (diet controlled), blood dyscrasia: anticoagulation therapy and thrombocytopenia, arthritis: OA., electrolyte abnormalities (K+ = 5.2), . ROS comment: Hard of hearing - bilateral hearing aids;    Right Hip fracture Abdominal:           Vascular: negative vascular ROS. Anesthesia Plan      general and spinal     ASA 4     (20g PIV Left Forearm  GA backup)  Induction: intravenous.   BIS  MIPS:

## 2020-09-10 NOTE — BRIEF OP NOTE
Brief Postoperative Note      Patient: Ginger Renee  YOB: 1928  MRN: [id number]    Date of Procedure: 9/10/2020    Pre-Op Diagnosis: Right subcapital femoral neck fracture    Post-Op Diagnosis: Same       Procedure(s):  RIGHT HIP OPEN REDUCTION INTERNAL FIXATION SYNTHES    Surgeon(s):  Delonte Waters MD    Assistant:  Resident: Michael Shaw DO    Anesthesia: Spinal    Estimated Blood Loss (mL): Minimal    Complications: None      Implants:  Implant Name Type Inv.  Item Serial No.  Lot No. LRB No. Used Action   WASHER SCRW DICKSON SS 6.5X13MM Screw/Plate/Nail/Daniel WASHER SCRW DICKSON SS 6.5X13MM  SYNTHES  Right 3 Implanted   SCREW DICKSON 16MM THRD SS 6.5X70MM Screw/Plate/Nail/Daniel SCREW DICKSON 16MM THRD SS 6.5X70MM  SYNTHES  Right 1 Implanted   SCREW DICKSON 15MM THRD SS 6.5X75MM Screw/Plate/Nail/Daniel SCREW DICKSON 15MM THRD SS 6.5X75MM  SYNTHES  Right 2 Implanted         Electronically signed by Michael Shaw DO on 9/10/2020 at 5:44 PM

## 2020-09-10 NOTE — ANESTHESIA PROCEDURE NOTES
Spinal Block    Patient location during procedure: OR  Reason for block: primary anesthetic and at surgeon's request  Staffing  Anesthesiologist: Silvia Arriola DO  Preanesthetic Checklist  Completed: patient identified, site marked, surgical consent, pre-op evaluation, timeout performed, IV checked, risks and benefits discussed, monitors and equipment checked, anesthesia consent given, oxygen available and patient being monitored  Spinal Block  Patient position: right lateral decubitus  Prep: Betadine  Patient monitoring: cardiac monitor, continuous pulse ox, continuous capnometry and frequent blood pressure checks  Approach: right paramedian  Location: L3/L4  Provider prep: mask, sterile gloves and sterile gown  Local infiltration: lidocaine  Agent: bupivacaine  Dose: 1.3  Dose: 1.3  Needle  Needle type: Quincke   Needle gauge: 22 G  Needle length: 3.5 in  Assessment  Swirl obtained: Yes  CSF: clear  Attempts: 1  Hemodynamics: stable  Additional Notes  0.75% Bupiv with 8.25% dextrose 9.75 mg spinal

## 2020-09-10 NOTE — PROGRESS NOTES
Jean Marie Joiner,    Your patient is on a medication that requires a renal dose adjustment. Renal Function Assessment:    Date Body Weight IBW Adj. Body Weight SCr CrCl Dialysis status   9/10/2020 61.2 kg   1.2 26 ml/min        Pharmacy has renally dose-adjusted the following medication(s):    Date Medication Original Dosing Regimen New Dosing Regimen   9/10/2020 Lovenox  40 mg Sub-Q daily 30 mg Sub-Q daily           These changes were made per protocol according to the [facility] Renal Function-Based Dose Adjustments Policy    *Please note this dose may need readjusted if your patient's renal function significantly improves. Please contact pharmacy with any questions regarding these changes.

## 2020-09-10 NOTE — PROGRESS NOTES
80 Dr Kemal Piper here pt update given [id number] 12.5 gms given.  cmr called pt on monitor family to meet pt in rm

## 2020-09-10 NOTE — PROGRESS NOTES
Subjective: The patient is awake and alert. No problems overnight. Denies chest pain, angina, and dyspnea. Denies abdominal pain. NPO for surgery. No nausea or vomiting. Objective:    BP (!) 102/53   Pulse 96   Temp 97.6 °F (36.4 °C) (Oral)   Resp 18   Ht 5' 2\" (1.575 m)   Wt 140 lb 14.4 oz (63.9 kg)   SpO2 96%   BMI 25.77 kg/m²     Current medications that patient is taking have been reviewed. Heart:  RRR, no murmurs, gallops, or rubs.   Lungs:  CTA bilaterally, no wheeze, rales or rhonchi  Abd: bowel sounds present, soft, nontender, nondistended, no masses  Extrem:  No cyanosis or edema    CBC with Differential:    Lab Results   Component Value Date    WBC 5.7 09/09/2020    RBC 3.33 09/09/2020    HGB 11.7 09/09/2020    HCT 34.6 09/09/2020     09/09/2020    .9 09/09/2020    MCH 35.1 09/09/2020    MCHC 33.8 09/09/2020    RDW 13.8 09/09/2020    NRBC 0.0 03/15/2018    SEGSPCT 51 05/14/2013    LYMPHOPCT 14.2 09/09/2020    LYMPHOPCT 31.2 03/15/2018    MONOPCT 8.3 09/09/2020    BASOPCT 1.6 09/09/2020    MONOSABS 0.47 09/09/2020    LYMPHSABS 0.81 09/09/2020    EOSABS 0.48 09/09/2020    BASOSABS 0.09 09/09/2020     CMP:    Lab Results   Component Value Date     09/09/2020    K 5.2 09/09/2020     09/09/2020    CO2 22 09/09/2020    BUN 24 09/09/2020    CREATININE 1.2 09/09/2020    GFRAA 51 09/09/2020    LABGLOM 42 09/09/2020    LABGLOM 48 03/15/2018    GLUCOSE 77 09/09/2020    GLUCOSE 78 03/15/2018    PROT 6.6 09/08/2020    LABALBU 3.9 09/08/2020    LABALBU 3.9 03/15/2018    CALCIUM 8.7 09/09/2020    BILITOT 0.5 09/08/2020    ALKPHOS 102 09/08/2020    AST 25 09/08/2020    ALT 16 09/08/2020     BMP:    Lab Results   Component Value Date     09/09/2020    K 5.2 09/09/2020     09/09/2020    CO2 22 09/09/2020    BUN 24 09/09/2020    LABALBU 3.9 09/08/2020    LABALBU 3.9 03/15/2018    CREATININE 1.2 09/09/2020    CALCIUM 8.7 09/09/2020    GFRAA 51 09/09/2020    LABGLOM 42 09/09/2020    LABGLOM 48 03/15/2018    GLUCOSE 77 09/09/2020    GLUCOSE 78 03/15/2018     Magnesium:    Lab Results   Component Value Date    MG 1.5 02/10/2018     Phosphorus:  No results found for: PHOS  PT/INR:    Lab Results   Component Value Date    PROTIME 13.6 09/10/2020    INR 1.2 09/10/2020     PTT:    Lab Results   Component Value Date    APTT 40.5 06/19/2018   [APTT}     Assessment:    Patient Active Problem List   Diagnosis    Cardiac pacemaker - [employer] SR     Hyperlipidemia with target LDL less than 100    Essential hypertension, benign    CAD (coronary artery disease), native coronary artery    Chronic atrial fibrillation    Closed right hip fracture, initial encounter ([facility] [address]    CKD (chronic kidney disease) stage 3, GFR 30-59 ml/min ([facility])       Plan:    Stable. Continue aggressive lipid therapy  Blood pressure ok, continue current medications  Continue medical management of ASCAD. Rate controlled. Pt/Ot, pain management and DVT prophylaxis as per ortho. Renal function at baseline.   Pt/Ot evaluations for discharge planning      Erich Trevino    10:56 AM  9/10/2020

## 2020-09-10 NOTE — PROGRESS NOTES
Physical Therapy  Facility/Department: [facility] INTERNAL MEDICINE 2    NAME: Maile Graham  : 1928  MRN: [id number]     Chart reviewed and PT treatment attempted this am.  Will hold PT treatment this date for possible surgical intervention for displaced femoral head fracture. Will continue to follow.       Ayde Doss, [phone]

## 2020-09-11 VITALS
RESPIRATION RATE: 16 BRPM | TEMPERATURE: 98.2 F | SYSTOLIC BLOOD PRESSURE: 113 MMHG | DIASTOLIC BLOOD PRESSURE: 58 MMHG | BODY MASS INDEX: 24.69 KG/M2 | HEART RATE: 84 BPM | OXYGEN SATURATION: 96 % | HEIGHT: 62 IN | WEIGHT: 134.2 LBS

## 2020-09-11 PROCEDURE — 6360000002 HC RX W HCPCS: Performed by: STUDENT IN AN ORGANIZED HEALTH CARE EDUCATION/TRAINING PROGRAM

## 2020-09-11 PROCEDURE — 2580000003 HC RX 258: Performed by: STUDENT IN AN ORGANIZED HEALTH CARE EDUCATION/TRAINING PROGRAM

## 2020-09-11 PROCEDURE — 97164 PT RE-EVAL EST PLAN CARE: CPT

## 2020-09-11 PROCEDURE — 97165 OT EVAL LOW COMPLEX 30 MIN: CPT

## 2020-09-11 PROCEDURE — 97530 THERAPEUTIC ACTIVITIES: CPT

## 2020-09-11 PROCEDURE — 6370000000 HC RX 637 (ALT 250 FOR IP): Performed by: STUDENT IN AN ORGANIZED HEALTH CARE EDUCATION/TRAINING PROGRAM

## 2020-09-11 RX ADMIN — Medication 2 G: at 00:02

## 2020-09-11 RX ADMIN — Medication 2 G: at 08:10

## 2020-09-11 RX ADMIN — SODIUM CHLORIDE: 9 INJECTION, SOLUTION INTRAVENOUS at 04:45

## 2020-09-11 RX ADMIN — ACETAMINOPHEN 650 MG: 325 TABLET ORAL at 06:48

## 2020-09-11 RX ADMIN — SODIUM CHLORIDE, PRESERVATIVE FREE 10 ML: 5 INJECTION INTRAVENOUS at 08:10

## 2020-09-11 RX ADMIN — OXYCODONE HYDROCHLORIDE 5 MG: 5 TABLET ORAL at 16:23

## 2020-09-11 RX ADMIN — DOCUSATE SODIUM 300 MG: 100 CAPSULE, LIQUID FILLED ORAL at 11:16

## 2020-09-11 RX ADMIN — ACETAMINOPHEN 650 MG: 325 TABLET ORAL at 01:46

## 2020-09-11 RX ADMIN — PETROLATUM: 420 OINTMENT TOPICAL at 08:10

## 2020-09-11 RX ADMIN — ACETAMINOPHEN 650 MG: 325 TABLET ORAL at 12:23

## 2020-09-11 ASSESSMENT — PAIN SCALES - PAIN ASSESSMENT IN ADVANCED DEMENTIA (PAINAD)
CONSOLABILITY: 0
BODYLANGUAGE: 0
TOTALSCORE: 0
NEGVOCALIZATION: 0
BREATHING: 0
FACIALEXPRESSION: 0

## 2020-09-11 ASSESSMENT — PAIN - FUNCTIONAL ASSESSMENT
PAIN_FUNCTIONAL_ASSESSMENT: PREVENTS OR INTERFERES SOME ACTIVE ACTIVITIES AND ADLS
PAIN_FUNCTIONAL_ASSESSMENT: PREVENTS OR INTERFERES SOME ACTIVE ACTIVITIES AND ADLS

## 2020-09-11 ASSESSMENT — PAIN SCALES - GENERAL
PAINLEVEL_OUTOF10: 0
PAINLEVEL_OUTOF10: 5
PAINLEVEL_OUTOF10: 0
PAINLEVEL_OUTOF10: 5
PAINLEVEL_OUTOF10: 0
PAINLEVEL_OUTOF10: 3
PAINLEVEL_OUTOF10: 4

## 2020-09-11 ASSESSMENT — PAIN DESCRIPTION - PROGRESSION
CLINICAL_PROGRESSION: GRADUALLY WORSENING
CLINICAL_PROGRESSION: GRADUALLY WORSENING

## 2020-09-11 ASSESSMENT — PAIN DESCRIPTION - FREQUENCY
FREQUENCY: CONTINUOUS
FREQUENCY: CONTINUOUS

## 2020-09-11 ASSESSMENT — PAIN DESCRIPTION - ORIENTATION
ORIENTATION: RIGHT
ORIENTATION: RIGHT

## 2020-09-11 ASSESSMENT — PAIN DESCRIPTION - DESCRIPTORS
DESCRIPTORS: ACHING;SORE;TENDER
DESCRIPTORS: SORE;DISCOMFORT;TENDER

## 2020-09-11 ASSESSMENT — PAIN DESCRIPTION - PAIN TYPE
TYPE: ACUTE PAIN
TYPE: ACUTE PAIN

## 2020-09-11 ASSESSMENT — PAIN DESCRIPTION - LOCATION
LOCATION: HIP
LOCATION: HIP

## 2020-09-11 ASSESSMENT — PAIN DESCRIPTION - ONSET
ONSET: ON-GOING
ONSET: ON-GOING

## 2020-09-11 NOTE — PROGRESS NOTES
Department of Orthopedic Surgery      SUBJECTIVE  Pt seen and examined. Resting comfortably in bed. Pain controlled. OBJECTIVE    Physical    VITALS:  /67   Pulse 82   Temp 98 °F (36.7 °C) (Oral)   Resp 18   Ht 5' 2\" (1.575 m)   Wt 134 lb 3.2 oz (60.9 kg)   SpO2 93%   BMI 24.55 kg/m²     right LE:   · Dressing C/D/I, moderate amount of dried blood on dressing.  No hematoma  · Distal sensory intact  · +2/4 PT and DP  · +PF/DF/EHL  · Compartments soft and compressible    Data    CBC:   Lab Results   Component Value Date    WBC 5.7 09/09/2020    RBC 3.33 09/09/2020    HGB 11.7 09/09/2020    HCT 34.6 09/09/2020    .9 09/09/2020    MCH 35.1 09/09/2020    MCHC 33.8 09/09/2020    RDW 13.8 09/09/2020     09/09/2020    MPV 11.3 09/09/2020     PT/INR:    Lab Results   Component Value Date    PROTIME 13.6 09/10/2020    INR 1.2 09/10/2020     xrays of right hip and pelvis with stable right hip ORIF    ASSESSMENT   · POD 1 s/p right hip ORIF    PLAN    · WBAT  · PT/OT protocol  · Pain control  · Follow up in the office in 7-10 days

## 2020-09-11 NOTE — PROGRESS NOTES
Functional Mobility Mod A with [employer]  Small steps to armchair  Limited further as pt impulsively attempts to sit, max cues to ensure safe transition to sit  SBA during ADLs   Balance Sitting: fair     Standing: poor plus at [employer]     Activity Tolerance poor  standing deacon x5-6 min with fair balance during self care tasks   B UE 4-/5  4/5       Treatment: Patient educated on techniques for completion of ADL, safe functional transfers and functional mobility. Patient required cues for follow through with proper hand/foot placement, pacing, safety, attention, sequencing, precautions and technique in bed mobility, functional transfers, functional mobility, UB dressing and LB dressing in preparation for maximum independence in all self care tasks.      Hand Dominance: Right []  Left []   Strength ROM Additional Info:    RUE  4-/5 WFL good  and FMC/dexterity noted during ADL tasks     LUE 4-/5 WFL good  and FMC/dexterity noted during ADL tasks         Hearing: [place] even with hearing aids in  Vision: [employer]   Sensation:  No c/o numbness or tingling   Tone: WFL   Edema: none                             Long Term Goal (1-3 wks): Pt will maximize functional performance in all self care tasks/functional transfers with good follow through of all trained techniques for safe transition to next level of care    Eval Complexity: Low    Assessment of current deficits   Functional mobility [x]  ADLs [x] Strength [x]  Cognition []  Functional transfers  [x] IADLs [x] Safety Awareness [x]  Endurance [x]  Fine Motor Coordination [] Balance [x] Vision/perception [] Sensation []   Gross Motor Coordination [] ROM [] Delirium []                  Motor Control []    Plan of Care:   [x] ADL retraining/AE recommendations specific to diagnosis of R hip ORIF  [x] Energy Conservation Techniques/Strategies      [] Neuromuscular Re-Education      [x] Functional Transfer Training         [x] Functional Mobility Training          [] Cognitive Re-Training         [] Splinting/Positioning Needs           [x] Therapeutic Activity   [x]Therapeutic Exercise   [] Visual/Perceptual  [x] Delirium Prevention/Treatment   [x] Positioning to Improve Functional [place], Safety, and Skin Integrity   [x] Patient and/or Family Education to Increase Safety and Functional [place]   [] Other:     Rehab Potential: Good for established goals     Patient / Family Goal: To go home. Patient and/or family were instructed on functional diagnosis, prognosis/goals and OT plan of care. Pt verbalized questionable understanding. Upon arrival, patient supine in bed. At end of session, patient seated in armchair with call light and phone within reach, all lines and tubes intact. Pt would benefit from continued skilled OT to increase safety and independence with completion of ADL/IADL tasks for functional independence and quality of life.  Bed/chair alarm: ON    Low Evaluation  Time In: 823   Time Out: 836      Evaluation time includes thorough review of current medical information, gathering information on past medical history/social history and prior level of function, completion of standardized testing/informal observation of tasks, assessment of data, and development of POC/Goals    Sergei Henry OTR/L  [id number]

## 2020-09-11 NOTE — PROGRESS NOTES
Physical Therapy    Facility/Department: [address] INTERNAL MEDICINE 2  Re Assessment    NAME: Brendan Ferguson  : 1928  MRN: [id number]    Date of Service: 2020      Patient Diagnosis(es): The primary encounter diagnosis was Supratherapeutic INR. Diagnoses of Acute renal failure superimposed on chronic kidney disease, unspecified CKD stage, unspecified acute renal failure type ([address] Hyperkalemia, Closed impacted fracture of right hip, initial encounter ([facility] [address] and History of surgery were also pertinent to this visit. has a past medical history of Allergic rhinitis, Asthma, Atrial fibrillation ([address] CAD (coronary artery disease), Diabetes mellitus ([address] DJD (degenerative joint disease), Frequent UTI, Hearing loss, Hyperlipidemia, Hypertension, Liver disease, Myalgia, Osteopenia, Palmar erythema, Peptic reflux disease, Urinary incontinence, Vitamin B12 deficiency, and Wears glasses. has a past surgical history that includes Diagnostic Cardiac Cath Lab Procedure; Coronary angioplasty; Cardiac pacemaker placement; joint replacement (Left, 5/3/2013); pacemaker placement (); Colonoscopy; Hysterectomy; Fixation Kyphoplasty (5/21/15); pr partial hip replacement (Left, 2018); eye surgery (Bilateral); Appendectomy; Tonsillectomy; and vitrectomy (Left).    Referring Provider:  Eliseo Rojas DO      Evaluating Therapist: Nina Kessler PT        Room # 414  DIAGNOSIS: weakness, decreased appetite  ORIF R HIP  Additional pertinent history: impaction fx R hip  PRECAUTIONS: falls, WBAT, alarm     Social:  Pt lives alone in a 1 floor plan. Ambulates with ww, family uses w/c to assist pt on stairs. Has caregivers 11-6 and son comes at night to assist her to bed.        RE Evaluation  Date: 20 Treatment      Short Term/ Long Term   Goals   Was pt agreeable to Eval/treatment? yes       Does pt have pain?  R hip with mobility       Bed Mobility  Rolling: NT  Supine to sit: max assist  Sit to supine: NT assist  Scooting: max assist   Min assist   Transfers Sit to stand: mod assist  Stand to sit: modassist  Stand pivot: mod assist   CGA   Ambulation    3 feet with ww mod assist   75 feet with ww CGA   Stair Negotiation  Ascended and descended  NT    N/A   LE strength   R LE grossly 3-/5 L LE 3/5         balance      Fair- with ww       AM-PAC Raw score               11/24             Pt is alert, confused  LE ROM: WFl  Sensation: intact  Edema: none  Endurance: fair        ASSESSMENT  Pt displays functional ability as noted in the objective portion of this evaluation.       Patient education  Pt educated on transfer technique     Patient response to education:   Pt verbalized understanding Pt demonstrated skill Pt requires further education in this area   No  with assist  yes      Treatment:  Pt educated on importance of mobility. Pt instructed on transfer technique and walker safety. Assist to move walker. Pt limited by pain with mobility. Fatigues quickly       Pt's/ family goals   1. none stated     PLAN:     PT care will be provided in accordance with the objectives noted above. Exercises and functional mobility practice will be used as well as appropriate assistive devices or modalities to obtain goals. Patient and family education will also be administered as needed.     Frequency of treatments: 2-5x/week x 5.days        Time in 0820  Time out 0843        Evaluation Time includes thorough review of current medical information, gathering information on past medical history/social history and prior level of function, completion of standardized testing/informal observation of tasks, assessment of data and education on plan of care and goals.     CPT codes:  []? Low Complexity PT evaluation [id number]  []? Moderate Complexity PT evaluation 22789  []? High Complexity PT evaluation [id number]  [x]? PT Re-evaluation [id number]  []? Gait training 65599 minutes  []? Manual therapy 08490 minutes  [x]?  Therapeutic activities 31168 10 minutes  []? Therapeutic exercises 54589 minutes  []?  Neuromuscular reeducation 20678 minutes      Jimmy Rodriguez PT [id number]

## 2020-09-11 NOTE — CARE COORDINATION
Social Work:    Physician ambulance arranged transport to [facility] for 7:30 p.m. Social work notified charge RN, patient & son Gricel Bender, and Gwinda Mohs at [employer].     Electronically signed by ROBERTO CARLOS Casanova on 9/11/2020 at 12:40 PM

## 2020-09-11 NOTE — PLAN OF CARE
Problem: Confusion - Acute:  Goal: Absence of continued neurological deterioration signs and symptoms  Description: Absence of continued neurological deterioration signs and symptoms  Outcome: Met This Shift
Problem: Falls - Risk of:  Goal: Will remain free from falls  Description: Will remain free from falls  9/10/2020 2332 by Janette Samuel RN  Outcome: Met This Shift  9/10/2020 1746 by Erica Mc RN  Outcome: Met This Shift     Problem: Skin Integrity:  Goal: Will show no infection signs and symptoms  Description: Will show no infection signs and symptoms  9/10/2020 2332 by Janette Samuel RN  Outcome: Met This Shift  9/10/2020 1746 by Erica Mc RN  Outcome: Met This Shift     Problem: Pain:  Goal: Pain level will decrease  Description: Pain level will decrease  9/10/2020 2332 by Janette Samuel RN  Outcome: Met This Shift  9/10/2020 1746 by Erica Mc RN  Outcome: Met This Shift
Problem: Falls - Risk of:  Goal: Will remain free from falls  Description: Will remain free from falls  Outcome: Met This Shift     Problem: Skin Integrity:  Goal: Will show no infection signs and symptoms  Description: Will show no infection signs and symptoms  Outcome: Met This Shift     Problem: Pain:  Goal: Pain level will decrease  Description: Pain level will decrease  Outcome: Met This Shift
Problem: Falls - Risk of:  Goal: Will remain free from falls  Description: Will remain free from falls  Outcome: Met This Shift  Goal: Absence of physical injury  Description: Absence of physical injury  Outcome: Met This Shift     Problem: Skin Integrity:  Goal: Will show no infection signs and symptoms  Description: Will show no infection signs and symptoms  Outcome: Met This Shift     Problem: Pain:  Goal: Pain level will decrease  Description: Pain level will decrease  Outcome: Met This Shift
symptoms  9/9/2020 0157 by Sg Haney RN  Outcome: Met This Shift  9/9/2020 0156 by Sg Haney RN  Outcome: Met This Shift  9/8/2020 1523 by Latoya Jin RN  Outcome: Ongoing     Problem: Bleeding:  Goal: Absence of active bleeding  9/9/2020 0157 by Sg Haney RN  Outcome: Met This Shift  9/9/2020 0156 by Sg Haney RN  Outcome: Met This Shift  9/8/2020 1523 by Latoya Jin RN  Outcome: Met This Shift     Problem: Constipation:  Goal: Occurrences of constipation will decrease  Description: Occurrences of constipation will decrease  9/8/2020 1523 by Latoya Jin RN  Outcome: Ongoing     Problem: MOBILITY  Goal: Mobility level  Description: Ability to move purposefully.   9/9/2020 0157 by Sg Haney RN  Outcome: Ongoing  9/9/2020 0156 by Sg Haney RN  Outcome: Ongoing  9/8/2020 1523 by Latoya Jin RN  Outcome: Ongoing     Problem: FLUID AND ELECTROLYTE IMBALANCE  Goal: Fluid and electrolyte balance are achieved/maintained  9/9/2020 0157 by Sg Haney RN  Outcome: Met This Shift  9/9/2020 0156 by Sg Haney RN  Outcome: Met This Shift  9/8/2020 1523 by Latoya Jin RN  Outcome: Ongoing

## 2020-09-11 NOTE — PROGRESS NOTES
Cynthia Cardoza to transport patient to [address] [place] [address] at 1600, Friday 9/11/2020.  Judith loving

## 2020-09-11 NOTE — OP NOTE
[id number] [address]                                OPERATIVE REPORT    PATIENT NAME: Holger GUILLEN                   :        1928  MED REC NO:   [id number]                            ROOM:       8810  ACCOUNT NO:   [de-identified]                           ADMIT DATE: 2020  PROVIDER:     Faisal Kendall MD    DATE OF PROCEDURE:  09/10/2020    PREOPERATIVE DIAGNOSIS:  Right closed displaced femoral neck fracture. POSTOPERATIVE DIAGNOSIS:  Right closed displaced femoral neck fracture. PROCEDURE PERFORMED:  Right hip open reduction and internal fixation  using three Synthes 6.5-mm cannulated screws measuring 70 mm, 75 mm, and  another 75 mm length screw with washers. SURGEON:  Faisal Kendall M.D. ANESTHESIA:  1. Spinal.  2.  Sedation. ASSISTANT:  Mil Brennan DO, Orthopedic Surgery Resident. ESTIMATED BLOOD LOSS:  Less than 50 mL. FINDINGS:  1. Intraoperative fluoroscopy was confirm a displaced subcapital  femoral neck fracture. 2.  Status post closed reduction maneuvers, improved alignment was  achieved. 3.  Status post fracture fixation, the fracture was stabilized with  three cannulated screws. Intraoperative fluoroscopy was used to confirm  extraarticular placement of the screws. COMPLICATIONS:  None. DISPOSITION:  The patient remained stable throughout the procedure. .    OPERATIVE INDICATIONS:  The patient is a 79-year-old female, who fell  and sustained a subcapital femoral neck fracture on the right hip. She  had previously undergone retrograde nailing of supracondylar distal  femur fracture. After extensive discussion with the patient as well as  the patient's family, they wished to proceed with open reduction and  internal fixation of the fracture.   Risks included, but not limited to  the risk of infection, damage to nerves, vessels, or tendons, malunion  or nonunion, achieved. Fluoroscopy  using approach-withdrawal technique under live imaging confirmed  extraarticular placement of the screw. One screw was exchanged for a  shorter screw. With this confirmed, traction was removed. The fracture  remained stable. The wound was copiously irrigated out. The deep  tissue closed with 0 Vicryl suture followed by 2-0 Vicryl sutures,  staples, and Aquacel dressing.         Terrie Bryant MD    D: 09/10/2020 17:32:18       T: 09/10/2020 17:45:00     [id number]  Job#: [id number]     Doc#: [id number]    CC:

## 2020-09-11 NOTE — CARE COORDINATION
Social Work:    Confirmed acceptance at [facility] with [id number] in admissions. Advised charge RN patient is able to transfer today if stable. Ortho signed off and charge RN is checking discharge status with Dr. Felix Charles.   (N-17, hens, ambulance done)    Electronically signed by ROBERTO CARLOS Edmond on 9/11/2020 at 11:15 AM

## 2020-09-11 NOTE — PROGRESS NOTES
Subjective: The patient is awake and alert. Status post right hip open reduction and internal fixation (9/10/2020). No problems overnight. Denies chest pain, angina, and dyspnea. Denies abdominal pain. Tolerating diet. No nausea or vomiting. Objective:    BP (!) 113/58   Pulse 84   Temp 98.2 °F (36.8 °C) (Oral)   Resp 16   Ht 5' 2\" (1.575 m)   Wt 134 lb 3.2 oz (60.9 kg)   SpO2 96%   BMI 24.55 kg/m²     Current medications that patient is taking have been reviewed. Heart:  RRR, no murmurs, gallops, or rubs.   Lungs:  CTA bilaterally, no wheeze, rales or rhonchi  Abd: bowel sounds present, soft, nontender, nondistended, no masses  Extrem:  No cyanosis or edema    CBC with Differential:    Lab Results   Component Value Date    WBC 5.7 09/09/2020    RBC 3.33 09/09/2020    HGB 11.7 09/09/2020    HCT 34.6 09/09/2020     09/09/2020    .9 09/09/2020    MCH 35.1 09/09/2020    MCHC 33.8 09/09/2020    RDW 13.8 09/09/2020    NRBC 0.0 03/15/2018    SEGSPCT 51 05/14/2013    LYMPHOPCT 14.2 09/09/2020    LYMPHOPCT 31.2 03/15/2018    MONOPCT 8.3 09/09/2020    BASOPCT 1.6 09/09/2020    MONOSABS 0.47 09/09/2020    LYMPHSABS 0.81 09/09/2020    EOSABS 0.48 09/09/2020    BASOSABS 0.09 09/09/2020     CMP:    Lab Results   Component Value Date     09/09/2020    K 5.2 09/09/2020     09/09/2020    CO2 22 09/09/2020    BUN 24 09/09/2020    CREATININE 1.2 09/09/2020    GFRAA 51 09/09/2020    LABGLOM 42 09/09/2020    LABGLOM 48 03/15/2018    GLUCOSE 77 09/09/2020    GLUCOSE 78 03/15/2018    PROT 6.6 09/08/2020    LABALBU 3.9 09/08/2020    LABALBU 3.9 03/15/2018    CALCIUM 8.7 09/09/2020    BILITOT 0.5 09/08/2020    ALKPHOS 102 09/08/2020    AST 25 09/08/2020    ALT 16 09/08/2020     BMP:    Lab Results   Component Value Date     09/09/2020    K 5.2 09/09/2020     09/09/2020    CO2 22 09/09/2020    BUN 24 09/09/2020    LABALBU 3.9 09/08/2020    LABALBU 3.9 03/15/2018    CREATININE 1.2 09/09/2020    CALCIUM 8.7 09/09/2020    GFRAA 51 09/09/2020    LABGLOM 42 09/09/2020    LABGLOM 48 03/15/2018    GLUCOSE 77 09/09/2020    GLUCOSE 78 03/15/2018     Magnesium:    Lab Results   Component Value Date    MG 1.5 02/10/2018     Phosphorus:  No results found for: PHOS  PT/INR:    Lab Results   Component Value Date    PROTIME 13.6 09/10/2020    INR 1.2 09/10/2020     PTT:    Lab Results   Component Value Date    APTT 40.5 06/19/2018   [APTT}     Assessment:    Patient Active Problem List   Diagnosis    Cardiac pacemaker - [employer]     Hyperlipidemia with target LDL less than 100    Essential hypertension, benign    CAD (coronary artery disease), native coronary artery    Chronic atrial fibrillation    [facility] right [facility]    CKD (chronic kidney disease) stage 3, GFR 30-59 ml/min ([facility])       Plan:    Stable. Continue aggressive lipid therapy  Blood pressure ok, continue current medications  Continue medical management of ASCAD. Rate controlled. Pt/Ot, pain management and DVT prophylaxis as per ortho. Renal function at baseline. Pt/Ot evaluations for discharge planning. Discharge soon.         Remonia Angelucci    10:50 AM  9/11/2020

## 2020-09-14 NOTE — DISCHARGE SUMMARY
Physician Discharge Summary     Patient ID:  Keren An  [id number]  49 y.o.  1/23/1928    Admit date: 9/8/2020    Discharge date and time:  9/11/2020    Admission Diagnoses:   Patient Active Problem List   Diagnosis    Cardiac pacemaker - [employer]     Hyperlipidemia with target LDL less than 100    Essential hypertension, benign    CAD (coronary artery disease), native coronary artery    Chronic atrial fibrillation    Closed right [facility] fracture, initial encounter ([facility] [address] [facility]    CKD (chronic kidney disease) stage 3, GFR 30-59 ml/min ([facility] [address]       Discharge Diagnoses: as above    Consults: orthopedic surgery    Procedures: see chart    Hospital Course: patient was admitted with right hip pain status post fall. She was diagnosed with a right hip fracture. She was seen by ortho. She underwent right hip open reduction and internal fixation. She tolerated this well. She was seen by Pt/Ot and discharged to rehab in stable condition. Discharge Exam:  See progress note from today    Condition:  stable    Disposition: rehab    Patient Instructions:   Discharge Medication List as of 9/11/2020  2:19 PM      START taking these medications    Details   oxyCODONE-acetaminophen (PERCOCET) 5-325 MG per tablet Take 1 tablet by mouth every 6 hours as needed for Pain for up to 7 days. Intended supply: 7 days.  Take lowest dose possible to manage pain, Disp-28 tablet,R-0Print         CONTINUE these medications which have NOT CHANGED    Details   !! warfarin (COUMADIN) 2.5 MG tablet Take 2.5 mg by mouth twice a week Sat and sunHistorical Med      vitamin B-12 (CYANOCOBALAMIN) 500 MCG tablet Take 500 mcg by mouth dailyHistorical Med      !! warfarin (COUMADIN) 5 MG tablet Take 5 mg by mouth Five times weekly Monday, Tuesday, Wednesday, Thursday and FridayHistorical Med      metoprolol tartrate (LOPRESSOR) 25 MG tablet take 1 tablet by mouth twice a day, Disp-60 tablet,R-5Normal      acetaminophen (TYLENOL) 325 MG tablet Take 2 tablets by mouth every 6 hours as needed for Pain, Disp-120 tablet, R-3Print      docusate sodium (COLACE) 100 MG capsule Take 300 mg by mouth Daily with lunch Historical Med      fexofenadine (ALLEGRA ALLERGY) 180 MG tablet Take 180 mg by mouth Daily with supper Historical Med      ranitidine (ZANTAC) 150 MG tablet Take 150 mg by mouth 2 times daily Historical Med      oxybutynin (DITROPAN) 5 MG tablet Take 5 mg by mouth every morning Historical Med      allopurinol (ZYLOPRIM) 300 MG tablet Take 300 mg by mouth daily as needed Historical Med       !! - Potential duplicate medications found. Please discuss with provider. STOP taking these medications       bacitracin-polymyxin b (POLYSPORIN) [id number] UNIT/GM ointment Comments:   Reason for Stopping:         senna (SENOKOT) 8.6 MG TABS tablet Comments:   Reason for Stopping:         potassium chloride (KLOR-CON M) 20 MEQ TBCR extended release tablet Comments:   Reason for Stopping:         furosemide (LASIX) 20 MG tablet Comments:   Reason for Stopping:             Activity: activity as tolerated  Diet: low fat, low cholesterol diet    Follow up with dr Manpreet Steve in 1 week. Follow up with dr Freedom Cisneros in 1-2 weeks.       Note that over 30 minutes was spent in preparing discharge papers, discussing discharge with patient, medication review, etc.    Signed:  Corinne Mendenhall    9/14/2020  12:54 PM

## 2020-09-16 ENCOUNTER — TELEPHONE (OUTPATIENT)
Dept: FAMILY MEDICINE CLINIC | Age: 85
End: 2020-09-16

## 2020-09-16 ENCOUNTER — ANTI-COAG VISIT (OUTPATIENT)
Dept: FAMILY MEDICINE CLINIC | Age: 85
End: 2020-09-16

## 2020-09-16 NOTE — TELEPHONE ENCOUNTER
Patient overdue for home INR (Basket INR) patient is now admitted to [facility] so will be performed at the facility

## 2020-09-18 ENCOUNTER — TELEPHONE (OUTPATIENT)
Dept: ORTHOPEDIC SURGERY | Age: 85
End: 2020-09-18

## 2020-09-22 ENCOUNTER — OUTSIDE SERVICES (OUTPATIENT)
Dept: FAMILY MEDICINE CLINIC | Age: 85
End: 2020-09-22
Payer: MEDICARE

## 2020-09-22 PROCEDURE — 99309 SBSQ NF CARE MODERATE MDM 30: CPT | Performed by: NURSE PRACTITIONER

## 2020-09-22 NOTE — PROGRESS NOTES
9/22/2020    Amanda Gomez  1/23/1928    This resident is being seen today for skilled evaluation visit. She is a resident who has long-term medical conditions including allergic rhinitis, erythema, osteopenia, myalgia, retention, hyperlipidemia, hearing loss, recurrent urinary tract infections, degenerative joint disease, diabetes mellitus which is dietary controlled, coronary artery disease, atrial fibrillation, and asthma. She is a 25-year-old female resident who is being seen today for both physical therapy as well as occupational therapy services recently seen in conjunction with the Department of orthopedic surgery with Dr. Iris Barney due to what appeared to be a right hip fracture. According to the resident she was in her kitchen she was walking to the living room apparently tripped and fell she landed on her right side she did benefit from a closed reduction with screw fixation . Currently at this time she denies any complaints of pain her lower extremities. She Furthermore denies any headaches any sore throat coughing or shortness of breath no nausea, vomiting, constipation or diarrhea no fever no chills no recent falls or syncopal events. Objective vital signs: Blood pressure 118/68 pulse 72 respirations 18 oxygen saturation 97% temperature 97.7 weight 140 pounds    physical examination for skin is essentially warm and dry with an incision to the right hip which has been monitored accordingly with the wound management nurse here at the facility HEENT she is hard of hearing otherwise unremarkable neck is supple heart regular rate and rhythm no rubs gallops or murmurs noted lungs are clear to auscultation no evidence of rhonchi rales or wheezing abdomen is soft supple and nontender bowel sounds are noted x4 quadrants no rigidity guarding or rebound tenderness negative England's negative McBurney's negative Ezekiel's.   Extremities she has minimal edema most notably to her right lower extremity

## 2020-09-28 ENCOUNTER — OFFICE VISIT (OUTPATIENT)
Dept: ORTHOPEDIC SURGERY | Age: 85
End: 2020-09-28

## 2020-09-28 VITALS — WEIGHT: 139 LBS | TEMPERATURE: 97.2 F | HEIGHT: 65 IN | BODY MASS INDEX: 23.16 KG/M2

## 2020-09-28 PROCEDURE — 99024 POSTOP FOLLOW-UP VISIT: CPT | Performed by: ORTHOPAEDIC SURGERY

## 2020-09-28 RX ORDER — OMEPRAZOLE 20 MG/1
CAPSULE, DELAYED RELEASE ORAL
COMMUNITY
Start: 2020-08-18 | End: 2020-12-07 | Stop reason: SDUPTHER

## 2020-09-28 NOTE — PROGRESS NOTES
HPI: Patient presents today POD #18 s/p right hip ORIF. Overall the patient is doing well. She resides at a facility. She reports little pain. Physical Exam: incision c/d/i with staples in place. + hematoma. No erythema or signs of infection. - tenderness over the hip. Brisk capillary refill. Remains NVI. X-rays of the right hip were obtained today in the office and reviewed with the patient. 3 views: AP/lateral/pelvis demonstrate stable right hip ORIF. no interval changes in hardware. Impression: stable right hip ORIF. Assessment: POD #18 s/p right hip ORIF    Plan:   Staples removed today in the office. Heating pad to right hip for hematoma. PT/OT for WBAT to right lower extremity. Follow up in 6 weeks with repeat x-rays. All questions and concerns answered. I have seen and evaluated the patient and agree with the above assessment and plan on today's visit. I have performed the key components of the history and physical examination with significant findings of postop doing well. Discussed findings with son and patient. WBAT. I concur with the findings and plan as documented.     Rhiannon Villalba MD  9/28/2020

## 2020-10-13 ENCOUNTER — TELEPHONE (OUTPATIENT)
Dept: FAMILY MEDICINE CLINIC | Age: 85
End: 2020-10-13

## 2020-10-13 LAB
INR BLD: 3.8
INR BLD: NORMAL
PROTIME: NORMAL

## 2020-10-13 RX ORDER — OXYBUTYNIN CHLORIDE 5 MG/1
5 TABLET ORAL EVERY MORNING
Qty: 90 TABLET | Refills: 1 | Status: SHIPPED
Start: 2020-10-13 | End: 2021-02-01 | Stop reason: SDUPTHER

## 2020-10-13 RX ORDER — ALLOPURINOL 300 MG/1
300 TABLET ORAL DAILY
Qty: 90 TABLET | Refills: 1 | Status: SHIPPED
Start: 2020-10-13 | End: 2021-02-01 | Stop reason: SDUPTHER

## 2020-10-13 NOTE — TELEPHONE ENCOUNTER
Pt's daughter called in requesting refills of pt's allopurinol 300 mg and oxybutynin 5 mg be sent to [facility] in [place], [place]. Medications are pended for Dr. Anthony Reyes to sign off on.

## 2020-10-13 NOTE — TELEPHONE ENCOUNTER
Called pt's daughter and left VM for her to call the office back in regards to the pt's INR results.

## 2020-10-14 ENCOUNTER — ANTI-COAG VISIT (OUTPATIENT)
Dept: FAMILY MEDICINE CLINIC | Age: 85
End: 2020-10-14

## 2020-10-15 ENCOUNTER — TELEPHONE (OUTPATIENT)
Dept: FAMILY MEDICINE CLINIC | Age: 85
End: 2020-10-15

## 2020-10-15 ENCOUNTER — ANTI-COAG VISIT (OUTPATIENT)
Dept: FAMILY MEDICINE CLINIC | Age: 85
End: 2020-10-15

## 2020-10-15 LAB — INR BLD: 2.4

## 2020-10-15 NOTE — TELEPHONE ENCOUNTER
Attempted to call pt's family, left VM for them to call the office back. Family needs informed of pt's INR results and how to proceed. Results are scanned into Epic.

## 2020-10-15 NOTE — TELEPHONE ENCOUNTER
Pt's daughter Lorenzo Rees called the office back. Went to give her the results of the pt's INR and she stated that the office had given her the results and the instructions yesterday. There was no documentation that someone had called her yesterday with the results.

## 2020-10-20 LAB — INR BLD: 2

## 2020-10-23 ENCOUNTER — TELEPHONE (OUTPATIENT)
Dept: FAMILY MEDICINE CLINIC | Age: 85
End: 2020-10-23

## 2020-10-23 ENCOUNTER — ANTI-COAG VISIT (OUTPATIENT)
Dept: FAMILY MEDICINE CLINIC | Age: 85
End: 2020-10-23

## 2020-10-23 NOTE — TELEPHONE ENCOUNTER
I spoke with Efra Tilley I did not receive INR results this week. Per suresh INR on 10- was 2.0 and will recheck on 10-. Noted in anti-coag encounter.

## 2020-10-27 ENCOUNTER — ANTI-COAG VISIT (OUTPATIENT)
Dept: FAMILY MEDICINE CLINIC | Age: 85
End: 2020-10-27

## 2020-10-27 LAB — INR BLD: 2.8

## 2020-11-03 ENCOUNTER — ANTI-COAG VISIT (OUTPATIENT)
Dept: FAMILY MEDICINE CLINIC | Age: 85
End: 2020-11-03

## 2020-11-03 LAB
INR BLD: 2.2
INR BLD: NORMAL
PROTIME: NORMAL

## 2020-11-10 LAB
INR BLD: 3.2
INR BLD: NORMAL
INR BLD: NORMAL
PROTIME: NORMAL
PROTIME: NORMAL

## 2020-11-11 ENCOUNTER — TELEPHONE (OUTPATIENT)
Dept: ORTHOPEDIC SURGERY | Age: 85
End: 2020-11-11

## 2020-11-11 NOTE — TELEPHONE ENCOUNTER
Spoke with patient's son. States does not wish to r/s cx appt at this time due to proximity. Patient doing well he states. Will call as needed.

## 2020-11-12 ENCOUNTER — ANTI-COAG VISIT (OUTPATIENT)
Dept: FAMILY MEDICINE CLINIC | Age: 85
End: 2020-11-12

## 2020-11-12 ENCOUNTER — TELEPHONE (OUTPATIENT)
Dept: FAMILY MEDICINE CLINIC | Age: 85
End: 2020-11-12

## 2020-11-12 NOTE — TELEPHONE ENCOUNTER
Per Patrick rosas' INR higha t 3.2   Do not take tonights dose of Warfarin, then continue as usual.  Retest in 1 week. Family member advised.

## 2020-11-20 ENCOUNTER — TELEPHONE (OUTPATIENT)
Dept: FAMILY MEDICINE CLINIC | Age: 85
End: 2020-11-20

## 2020-11-20 NOTE — TELEPHONE ENCOUNTER
Brianne Pagan letting Dr. Maggi Lopez know [place] has a Stage 1 Pressure ulcer on her tailbone, and is constipated, hardly any BM since 11-. Any orders?

## 2020-11-20 NOTE — TELEPHONE ENCOUNTER
Per Dr. Reva Ronquillo , I told Molly Higgins to give Amanda MOM 10 cc daily until she has normal BM's. Use OTC Betudine Ointment for buttocks lesion. Sujey's  phone number is  [phone].

## 2020-11-24 ENCOUNTER — ANTI-COAG VISIT (OUTPATIENT)
Dept: FAMILY MEDICINE CLINIC | Age: 85
End: 2020-11-24

## 2020-11-24 LAB
INR BLD: 2.8
INR BLD: NORMAL
PROTIME: NORMAL

## 2020-11-25 ENCOUNTER — TELEPHONE (OUTPATIENT)
Dept: FAMILY MEDICINE CLINIC | Age: 85
End: 2020-11-25

## 2020-11-25 LAB
BILIRUBIN, POC: ABNORMAL
BLOOD URINE, POC: ABNORMAL
CLARITY, POC: ABNORMAL
COLOR, POC: YELLOW
GLUCOSE URINE, POC: ABNORMAL
KETONES, POC: ABNORMAL
LEUKOCYTE EST, POC: ABNORMAL
NITRITE, POC: POSITIVE
PH, POC: 5.5
PROTEIN, POC: ABNORMAL
SPECIFIC GRAVITY, POC: 1.02
UROBILINOGEN, POC: 0.2

## 2020-11-25 PROCEDURE — 81002 URINALYSIS NONAUTO W/O SCOPE: CPT | Performed by: FAMILY MEDICINE

## 2020-11-25 RX ORDER — NITROFURANTOIN 25; 75 MG/1; MG/1
100 CAPSULE ORAL 2 TIMES DAILY
Qty: 20 CAPSULE | Refills: 0 | Status: SHIPPED | OUTPATIENT
Start: 2020-11-25 | End: 2020-12-05

## 2020-11-28 LAB
ORGANISM: ABNORMAL
URINE CULTURE, ROUTINE: ABNORMAL

## 2020-12-01 LAB
INR BLD: NORMAL
PROTIME: NORMAL

## 2020-12-03 ENCOUNTER — TELEPHONE (OUTPATIENT)
Dept: FAMILY MEDICINE CLINIC | Age: 85
End: 2020-12-03

## 2020-12-03 NOTE — TELEPHONE ENCOUNTER
12/1/ 2020   INR 3.3. Per Patrick hold tonights dose, resume usual dosing. Recheck INR in 1 week. Unique advised, they did hold the dose last night.

## 2020-12-07 RX ORDER — WARFARIN SODIUM 5 MG/1
TABLET ORAL
Qty: 30 TABLET | Refills: 5 | Status: SHIPPED
Start: 2020-12-07 | End: 2020-12-22 | Stop reason: CLARIF

## 2020-12-07 RX ORDER — OMEPRAZOLE 20 MG/1
CAPSULE, DELAYED RELEASE ORAL
Qty: 60 CAPSULE | Refills: 5 | Status: SHIPPED
Start: 2020-12-07 | End: 2021-12-15 | Stop reason: SDUPTHER

## 2020-12-08 ENCOUNTER — TELEPHONE (OUTPATIENT)
Dept: FAMILY MEDICINE CLINIC | Age: 85
End: 2020-12-08

## 2020-12-08 ENCOUNTER — ANTI-COAG VISIT (OUTPATIENT)
Dept: FAMILY MEDICINE CLINIC | Age: 85
End: 2020-12-08

## 2020-12-08 LAB
INR BLD: 3.8
INR BLD: NORMAL
PROTIME: NORMAL

## 2020-12-08 NOTE — TELEPHONE ENCOUNTER
Reviewed INR results (12-) 3.8 with  via telephone. Hold warfarin for 2 days and recheck INR on 12-. Informed Anahi Roper (son).

## 2020-12-15 ENCOUNTER — ANTI-COAG VISIT (OUTPATIENT)
Dept: FAMILY MEDICINE CLINIC | Age: 85
End: 2020-12-15

## 2020-12-15 LAB
INR BLD: 2.2
INR BLD: NORMAL
PROTIME: NORMAL

## 2020-12-17 ENCOUNTER — TELEPHONE (OUTPATIENT)
Dept: FAMILY MEDICINE CLINIC | Age: 85
End: 2020-12-17

## 2020-12-17 ENCOUNTER — TELEPHONE (OUTPATIENT)
Dept: FAMILY MEDICINE CLINIC | Age: 85
End: 2020-12-17
Payer: MEDICARE

## 2020-12-17 DIAGNOSIS — N30.00 ACUTE CYSTITIS WITHOUT HEMATURIA: ICD-10-CM

## 2020-12-17 LAB
BILIRUBIN, POC: NEGATIVE
BLOOD URINE, POC: NORMAL
CLARITY, POC: NORMAL
COLOR, POC: NORMAL
GLUCOSE URINE, POC: NEGATIVE
KETONES, POC: NEGATIVE
LEUKOCYTE EST, POC: NEGATIVE
NITRITE, POC: NORMAL
PH, POC: 7
PROTEIN, POC: NORMAL
SPECIFIC GRAVITY, POC: 1.02
UROBILINOGEN, POC: NORMAL

## 2020-12-17 PROCEDURE — 81002 URINALYSIS NONAUTO W/O SCOPE: CPT | Performed by: FAMILY MEDICINE

## 2020-12-17 RX ORDER — HYDROCODONE BITARTRATE AND ACETAMINOPHEN 5; 325 MG/1; MG/1
1 TABLET ORAL EVERY 6 HOURS PRN
Qty: 30 TABLET | Refills: 0 | Status: SHIPPED | OUTPATIENT
Start: 2020-12-17 | End: 2020-12-22

## 2020-12-17 RX ORDER — CIPROFLOXACIN 500 MG/1
500 TABLET, FILM COATED ORAL 2 TIMES DAILY
Qty: 20 TABLET | Refills: 0 | Status: SHIPPED | OUTPATIENT
Start: 2020-12-17 | End: 2020-12-27

## 2020-12-17 NOTE — TELEPHONE ENCOUNTER
Talked to [facility] again, the S+S for Thierry Lincoln are that she is urinating 4-5 times during the night where she usually only goes 3-4 times during the day,  She is listless, she just sits in her chair.

## 2020-12-17 NOTE — TELEPHONE ENCOUNTER
Med is not on med list , I called pharmacy, they last filled it in 2018,for 1  TID. I called Kristen Stover back and insisted she get the pill bottle and read it to me. She said it is Norco 5-325 1-2 tabs q 4 hours , #30  Written by Tono Squires. Please write the order yourself or advise me to put in what you want this rx to be. Pt. Has not been seen in the office at all in 2020. Has been seen in [place].    Pharmacy if [address]

## 2020-12-17 NOTE — TELEPHONE ENCOUNTER
Treated for UTI 11- adan Macrodantin 100 mg  BID X 10 days. Ryan Clay says nothing has changed, she thinks she still may have UTI- could they bring down another specimen ? Kayla Gutierrez is unable to come out of the house.

## 2020-12-17 NOTE — TELEPHONE ENCOUNTER
Talked to Dr. Carrillo Sears about prior phone message, he said to have family bring in a UA from [facility] today, we will check it.

## 2020-12-20 LAB
ORGANISM: ABNORMAL
URINE CULTURE, ROUTINE: ABNORMAL

## 2020-12-21 RX ORDER — NITROFURANTOIN 25; 75 MG/1; MG/1
100 CAPSULE ORAL 2 TIMES DAILY
COMMUNITY
End: 2020-12-21 | Stop reason: SDUPTHER

## 2020-12-21 RX ORDER — NITROFURANTOIN 25; 75 MG/1; MG/1
100 CAPSULE ORAL 2 TIMES DAILY
Qty: 20 CAPSULE | Refills: 0 | Status: SHIPPED | OUTPATIENT
Start: 2020-12-21 | End: 2020-12-31

## 2020-12-21 NOTE — TELEPHONE ENCOUNTER
Regarding Urine C & S done on 12- reviewed results with . Stop cipro and begin, macrobid 100 mg 1 bid. Informed Conner Carl (son).

## 2020-12-22 ENCOUNTER — ANTI-COAG VISIT (OUTPATIENT)
Dept: FAMILY MEDICINE CLINIC | Age: 85
End: 2020-12-22

## 2020-12-22 LAB
INR BLD: 3.7
INR BLD: NORMAL
PROTIME: NORMAL

## 2020-12-22 RX ORDER — WARFARIN SODIUM 5 MG/1
2.5 TABLET ORAL DAILY
COMMUNITY
End: 2021-02-02 | Stop reason: CLARIF

## 2020-12-22 NOTE — PROGRESS NOTES
Home INR today 3.7 spoke with  via telephone hold warfarin for 2 days then begin warfarin 5 mg 1/2 daily. Informed Hilaria Chopra (daughter-in-law). Recheck INR 1 week.

## 2020-12-29 ENCOUNTER — ANTI-COAG VISIT (OUTPATIENT)
Dept: FAMILY MEDICINE CLINIC | Age: 85
End: 2020-12-29

## 2020-12-29 LAB
INR BLD: 2.6
INR BLD: NORMAL
PROTIME: NORMAL

## 2020-12-29 RX ORDER — HYDROCODONE BITARTRATE AND ACETAMINOPHEN 5; 325 MG/1; MG/1
1 TABLET ORAL 3 TIMES DAILY
COMMUNITY
End: 2020-12-29 | Stop reason: SDUPTHER

## 2020-12-29 RX ORDER — HYDROCODONE BITARTRATE AND ACETAMINOPHEN 5; 325 MG/1; MG/1
1 TABLET ORAL 3 TIMES DAILY
Qty: 90 TABLET | Refills: 0 | Status: SHIPPED | OUTPATIENT
Start: 2020-12-29 | End: 2021-01-28

## 2021-01-01 DIAGNOSIS — N39.0 CHRONIC UTI: ICD-10-CM

## 2021-01-01 DIAGNOSIS — R31.9 HEMATURIA, UNSPECIFIED TYPE: ICD-10-CM

## 2021-01-05 ENCOUNTER — ANTI-COAG VISIT (OUTPATIENT)
Dept: FAMILY MEDICINE CLINIC | Age: 86
End: 2021-01-05

## 2021-01-05 DIAGNOSIS — I48.20 CHRONIC ATRIAL FIBRILLATION (HCC): ICD-10-CM

## 2021-01-05 LAB
INR BLD: 4.2
INR BLD: NORMAL
PROTIME: NORMAL

## 2021-01-05 NOTE — PROGRESS NOTES
Reviewed INR (4.2) results with  via telephone. Hold warfarin and recheck INR on Friday 01- and will adjust warfarin at that time. Informed Patty Aggarwal.

## 2021-01-11 ENCOUNTER — TELEPHONE (OUTPATIENT)
Dept: FAMILY MEDICINE CLINIC | Age: 86
End: 2021-01-11

## 2021-01-11 ENCOUNTER — TELEPHONE (OUTPATIENT)
Dept: FAMILY MEDICINE CLINIC | Age: 86
End: 2021-01-11
Payer: MEDICARE

## 2021-01-11 DIAGNOSIS — N39.0 URINARY TRACT INFECTION WITH HEMATURIA, SITE UNSPECIFIED: Primary | ICD-10-CM

## 2021-01-11 DIAGNOSIS — R31.9 URINARY TRACT INFECTION WITH HEMATURIA, SITE UNSPECIFIED: Primary | ICD-10-CM

## 2021-01-11 DIAGNOSIS — N39.0 URINARY TRACT INFECTION WITHOUT HEMATURIA, SITE UNSPECIFIED: ICD-10-CM

## 2021-01-11 DIAGNOSIS — N39.0 URINARY TRACT INFECTION WITHOUT HEMATURIA, SITE UNSPECIFIED: Primary | ICD-10-CM

## 2021-01-11 LAB
BILIRUBIN, POC: NEGATIVE
BLOOD URINE, POC: NORMAL
CLARITY, POC: NORMAL
COLOR, POC: NORMAL
GLUCOSE URINE, POC: NEGATIVE
KETONES, POC: NEGATIVE
LEUKOCYTE EST, POC: NORMAL
NITRITE, POC: NEGATIVE
PH, POC: 6
PROTEIN, POC: NORMAL
SPECIFIC GRAVITY, POC: 1.02
UROBILINOGEN, POC: NORMAL

## 2021-01-11 PROCEDURE — 81002 URINALYSIS NONAUTO W/O SCOPE: CPT | Performed by: FAMILY MEDICINE

## 2021-01-11 RX ORDER — SULFAMETHOXAZOLE AND TRIMETHOPRIM 800; 160 MG/1; MG/1
1 TABLET ORAL 2 TIMES DAILY
Qty: 20 TABLET | Refills: 0 | Status: SHIPPED | OUTPATIENT
Start: 2021-01-11 | End: 2021-01-21

## 2021-01-12 ENCOUNTER — ANTI-COAG VISIT (OUTPATIENT)
Dept: FAMILY MEDICINE CLINIC | Age: 86
End: 2021-01-12

## 2021-01-12 DIAGNOSIS — I48.20 CHRONIC ATRIAL FIBRILLATION (HCC): ICD-10-CM

## 2021-01-12 LAB
INR BLD: 2.1
INR BLD: NORMAL
PROTIME: NORMAL

## 2021-01-14 ENCOUNTER — ANTI-COAG VISIT (OUTPATIENT)
Dept: FAMILY MEDICINE CLINIC | Age: 86
End: 2021-01-14

## 2021-01-14 LAB
INR BLD: 2.7
INR BLD: NORMAL
PROTIME: NORMAL

## 2021-01-15 LAB
ORGANISM: ABNORMAL
URINE CULTURE, ROUTINE: ABNORMAL

## 2021-01-16 LAB
INR BLD: NORMAL
PROTIME: NORMAL

## 2021-01-18 ENCOUNTER — ANTI-COAG VISIT (OUTPATIENT)
Dept: FAMILY MEDICINE CLINIC | Age: 86
End: 2021-01-18

## 2021-01-18 DIAGNOSIS — I48.20 CHRONIC ATRIAL FIBRILLATION (HCC): ICD-10-CM

## 2021-01-18 LAB — INR BLD: 2.7

## 2021-01-19 ENCOUNTER — ANTI-COAG VISIT (OUTPATIENT)
Dept: FAMILY MEDICINE CLINIC | Age: 86
End: 2021-01-19

## 2021-01-19 DIAGNOSIS — I48.20 CHRONIC ATRIAL FIBRILLATION (HCC): ICD-10-CM

## 2021-01-19 LAB
INR BLD: 3.2
INR BLD: NORMAL
PROTIME: NORMAL

## 2021-01-19 NOTE — PROGRESS NOTES
Reviewed with [address] telephone INR 3.2 today. Checking INR every other day while on Bactrim.  Per Latonya Pac hold warfarin until Next INR

## 2021-01-21 ENCOUNTER — TELEPHONE (OUTPATIENT)
Dept: FAMILY MEDICINE CLINIC | Age: 86
End: 2021-01-21

## 2021-01-21 LAB
INR BLD: 1.9
INR BLD: NORMAL
PROTIME: NORMAL
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
## 2021-01-22 ENCOUNTER — TELEPHONE (OUTPATIENT)
Dept: FAMILY MEDICINE CLINIC | Age: 86
End: 2021-01-22

## 2021-01-22 ENCOUNTER — ANTI-COAG VISIT (OUTPATIENT)
Dept: FAMILY MEDICINE CLINIC | Age: 86
End: 2021-01-22

## 2021-01-22 DIAGNOSIS — E78.5 HYPERLIPIDEMIA WITH TARGET LDL LESS THAN 100: ICD-10-CM

## 2021-01-22 DIAGNOSIS — F44.89 CONFUSION STATE: ICD-10-CM

## 2021-01-22 DIAGNOSIS — E11.9 TYPE 2 DIABETES MELLITUS WITHOUT COMPLICATION, WITHOUT LONG-TERM CURRENT USE OF INSULIN (HCC): ICD-10-CM

## 2021-01-22 DIAGNOSIS — N30.01 ACUTE CYSTITIS WITH HEMATURIA: ICD-10-CM

## 2021-01-22 DIAGNOSIS — I48.20 CHRONIC ATRIAL FIBRILLATION (HCC): ICD-10-CM

## 2021-01-22 DIAGNOSIS — M25.559 HIP PAIN: ICD-10-CM

## 2021-01-22 DIAGNOSIS — N39.0 URINARY TRACT INFECTION WITHOUT HEMATURIA, SITE UNSPECIFIED: Primary | ICD-10-CM

## 2021-01-22 RX ORDER — TRAMADOL HYDROCHLORIDE 50 MG/1
50 TABLET ORAL 2 TIMES DAILY
Qty: 60 TABLET | Refills: 0 | Status: SHIPPED
Start: 2021-01-22 | End: 2021-02-01 | Stop reason: SDUPTHER

## 2021-01-22 RX ORDER — NITROFURANTOIN MACROCRYSTALS 50 MG/1
CAPSULE ORAL
Qty: 30 CAPSULE | Refills: 5 | Status: SHIPPED
Start: 2021-01-22 | End: 2021-07-06 | Stop reason: SDUPTHER

## 2021-01-22 NOTE — PROGRESS NOTES
Per Dr. Jocelyn Prince, continue same Warfaring dosing, check INR every other day. Dr. Jocelyn Prince called [facility], had to leave message with the instructions. I called as well, again left message with instructions.

## 2021-01-22 NOTE — TELEPHONE ENCOUNTER
Sharron Storey called to ask for update. I told him Dr. Julieta Fuentes left message yesterday and so did I. Sharron Storey said [phone] is not the number he wanted us to call! (But that is the number he told me yesterday)  Advised about Warfarin issue. Will call him later about pain med and UTI. Sharron Storey says visiting nurse is coming today and will get a ua specimen.

## 2021-01-23 LAB
INR BLD: 1.7
INR BLD: NORMAL
PROTIME: NORMAL

## 2021-01-25 ENCOUNTER — ANTI-COAG VISIT (OUTPATIENT)
Dept: FAMILY MEDICINE CLINIC | Age: 86
End: 2021-01-25

## 2021-01-25 DIAGNOSIS — I48.20 CHRONIC ATRIAL FIBRILLATION (HCC): ICD-10-CM

## 2021-01-25 NOTE — PROGRESS NOTES
Informed Jun INR on 01- was 1.7 take extra 1/2 of 2.5 mg tablet today only per . Orders for BW and urine faxed to [facility] per 's conversation with Roselyn Jain on Friday 01-.

## 2021-01-26 ENCOUNTER — ANTI-COAG VISIT (OUTPATIENT)
Dept: FAMILY MEDICINE CLINIC | Age: 86
End: 2021-01-26

## 2021-01-26 DIAGNOSIS — I48.20 CHRONIC ATRIAL FIBRILLATION (HCC): ICD-10-CM

## 2021-01-26 LAB
INR BLD: 1.2
INR BLD: 1.2

## 2021-01-26 NOTE — PROGRESS NOTES
Received at call from MD INR - pt INR today is 1.2. Reviewed with Mariam Andino via telephone - give her 5 mg daily and recheck INR on 01-. Informed suresh.

## 2021-01-27 LAB
ALBUMIN SERPL-MCNC: NORMAL G/DL
ALP BLD-CCNC: NORMAL U/L
ALT SERPL-CCNC: NORMAL U/L
ANION GAP SERPL CALCULATED.3IONS-SCNC: NORMAL MMOL/L
AST SERPL-CCNC: NORMAL U/L
AVERAGE GLUCOSE: NORMAL
BASOPHILS ABSOLUTE: NORMAL
BASOPHILS RELATIVE PERCENT: NORMAL
BILIRUB SERPL-MCNC: NORMAL MG/DL
BILIRUBIN, URINE: NORMAL
BLOOD, URINE: NORMAL
BUN BLDV-MCNC: NORMAL MG/DL
CALCIUM SERPL-MCNC: NORMAL MG/DL
CHLORIDE BLD-SCNC: NORMAL MMOL/L
CLARITY: NORMAL
CO2: NORMAL
COLOR: NORMAL
CREAT SERPL-MCNC: NORMAL MG/DL
EOSINOPHILS ABSOLUTE: NORMAL
EOSINOPHILS RELATIVE PERCENT: NORMAL
GFR CALCULATED: NORMAL
GLUCOSE BLD-MCNC: NORMAL MG/DL
GLUCOSE URINE: NORMAL
HBA1C MFR BLD: NORMAL %
HCT VFR BLD CALC: NORMAL %
HEMOGLOBIN: NORMAL
KETONES, URINE: NORMAL
LEUKOCYTE ESTERASE, URINE: NORMAL
LYMPHOCYTES ABSOLUTE: NORMAL
LYMPHOCYTES RELATIVE PERCENT: NORMAL
MCH RBC QN AUTO: NORMAL PG
MCHC RBC AUTO-ENTMCNC: NORMAL G/DL
MCV RBC AUTO: NORMAL FL
MONOCYTES ABSOLUTE: NORMAL
MONOCYTES RELATIVE PERCENT: NORMAL
NEUTROPHILS ABSOLUTE: NORMAL
NEUTROPHILS RELATIVE PERCENT: NORMAL
NITRITE, URINE: NORMAL
PDW BLD-RTO: NORMAL %
PH UA: NORMAL
PLATELET # BLD: NORMAL 10*3/UL
PMV BLD AUTO: NORMAL FL
POTASSIUM SERPL-SCNC: NORMAL MMOL/L
PROTEIN UA: NORMAL
RBC # BLD: NORMAL 10*6/UL
SODIUM BLD-SCNC: NORMAL MMOL/L
SPECIFIC GRAVITY, URINE: NORMAL
T4 FREE: NORMAL
TOTAL PROTEIN: NORMAL
TSH SERPL DL<=0.05 MIU/L-ACNC: NORMAL M[IU]/L
UROBILINOGEN, URINE: NORMAL
VITAMIN B-12: NORMAL
WBC # BLD: NORMAL 10*3/UL

## 2021-01-28 ENCOUNTER — ANTI-COAG VISIT (OUTPATIENT)
Dept: FAMILY MEDICINE CLINIC | Age: 86
End: 2021-01-28

## 2021-01-28 DIAGNOSIS — I48.20 CHRONIC ATRIAL FIBRILLATION (HCC): ICD-10-CM

## 2021-01-28 NOTE — PROGRESS NOTES
Per Patrick, Take 5 mg Warfarin today, then 2.5 daily. Recheck INR Tuesday, 2-2-21. Pt. son advised.

## 2021-01-29 ENCOUNTER — ANTI-COAG VISIT (OUTPATIENT)
Dept: FAMILY MEDICINE CLINIC | Age: 86
End: 2021-01-29

## 2021-01-29 LAB
INR BLD: 1.6
INR BLD: NORMAL
PROTIME: NORMAL

## 2021-01-29 NOTE — PROGRESS NOTES
Per Dr. Shell Camejo, pt to take 5 mg warfarin tonight and tomorrow night, then resume 2.5 mg nightly. Recheck  INR in 1 week. Cristela Schmitt, her son, notified.

## 2021-02-01 ENCOUNTER — TELEPHONE (OUTPATIENT)
Dept: FAMILY MEDICINE CLINIC | Age: 86
End: 2021-02-01

## 2021-02-01 DIAGNOSIS — E78.5 HYPERLIPIDEMIA WITH TARGET LDL LESS THAN 100: ICD-10-CM

## 2021-02-01 DIAGNOSIS — I48.20 CHRONIC ATRIAL FIBRILLATION (HCC): ICD-10-CM

## 2021-02-01 DIAGNOSIS — E11.9 TYPE 2 DIABETES MELLITUS WITHOUT COMPLICATION, WITHOUT LONG-TERM CURRENT USE OF INSULIN (HCC): ICD-10-CM

## 2021-02-01 DIAGNOSIS — F44.89 CONFUSION STATE: ICD-10-CM

## 2021-02-01 DIAGNOSIS — N39.0 URINARY TRACT INFECTION WITHOUT HEMATURIA, SITE UNSPECIFIED: ICD-10-CM

## 2021-02-01 DIAGNOSIS — M25.559 HIP PAIN: ICD-10-CM

## 2021-02-01 DIAGNOSIS — N30.01 ACUTE CYSTITIS WITH HEMATURIA: ICD-10-CM

## 2021-02-01 RX ORDER — OXYBUTYNIN CHLORIDE 5 MG/1
5 TABLET ORAL EVERY MORNING
Qty: 90 TABLET | Refills: 1 | Status: SHIPPED
Start: 2021-02-01 | End: 2021-09-29 | Stop reason: SDUPTHER

## 2021-02-01 RX ORDER — TRAMADOL HYDROCHLORIDE 50 MG/1
50 TABLET ORAL 2 TIMES DAILY
Qty: 60 TABLET | Refills: 0 | Status: SHIPPED
Start: 2021-02-01 | End: 2021-06-01 | Stop reason: SDUPTHER

## 2021-02-01 RX ORDER — ALLOPURINOL 300 MG/1
300 TABLET ORAL DAILY
Qty: 90 TABLET | Refills: 1 | Status: SHIPPED
Start: 2021-02-01 | End: 2021-07-06 | Stop reason: SDUPTHER

## 2021-02-01 NOTE — TELEPHONE ENCOUNTER
Informed Sharron Storey (son) blood work results 01- ok but needs to eat a little more, Urine C & S was contaminated so if she is having UTI symptoms them will have to order a Urine C &NS straight cath per . Per Mikimarcelino Raleigh she is still having burning with urination. Will Fax order to [facility] [phone].

## 2021-02-01 NOTE — TELEPHONE ENCOUNTER
Pino lang did not get results from BW and urine that Bib HERNANDEZ did. I called [phone] they will get the results and fax to the office.

## 2021-02-02 ENCOUNTER — ANTI-COAG VISIT (OUTPATIENT)
Dept: FAMILY MEDICINE CLINIC | Age: 86
End: 2021-02-02

## 2021-02-02 DIAGNOSIS — I48.20 CHRONIC ATRIAL FIBRILLATION (HCC): ICD-10-CM

## 2021-02-02 LAB
INR BLD: 2.1
INR BLD: NORMAL
PROTIME: NORMAL

## 2021-02-02 RX ORDER — WARFARIN SODIUM 5 MG/1
5 TABLET ORAL
COMMUNITY
End: 2021-07-06 | Stop reason: CLARIF

## 2021-02-08 ENCOUNTER — TELEPHONE (OUTPATIENT)
Dept: FAMILY MEDICINE CLINIC | Age: 86
End: 2021-02-08

## 2021-02-08 NOTE — TELEPHONE ENCOUNTER
Per Gabe Omer - would like to re-cert due to frequent UTI. Reviewed with Nick Bolaños and yes ok to re-cert.

## 2021-02-09 ENCOUNTER — ANTI-COAG VISIT (OUTPATIENT)
Dept: FAMILY MEDICINE CLINIC | Age: 86
End: 2021-02-09

## 2021-02-09 DIAGNOSIS — I48.20 CHRONIC ATRIAL FIBRILLATION (HCC): ICD-10-CM

## 2021-02-09 LAB — INR BLD: 2.9

## 2021-02-16 ENCOUNTER — ANTI-COAG VISIT (OUTPATIENT)
Dept: FAMILY MEDICINE CLINIC | Age: 86
End: 2021-02-16

## 2021-02-16 DIAGNOSIS — I48.20 CHRONIC ATRIAL FIBRILLATION (HCC): ICD-10-CM

## 2021-02-16 LAB
INR BLD: 3.6
INR BLD: NORMAL
PROTIME: NORMAL

## 2021-02-18 ENCOUNTER — TELEPHONE (OUTPATIENT)
Dept: FAMILY MEDICINE CLINIC | Age: 86
End: 2021-02-18

## 2021-02-18 ENCOUNTER — ANTI-COAG VISIT (OUTPATIENT)
Dept: FAMILY MEDICINE CLINIC | Age: 86
End: 2021-02-18

## 2021-02-18 LAB
INR BLD: 2.4
INR BLD: NORMAL
PROTIME: NORMAL

## 2021-02-18 NOTE — TELEPHONE ENCOUNTER
INR good today 2.4. Per Dr. Diony Santana resume usual dosing- 5 mg M thru F; 2.5 mg Sat. Sun. Recheck INR in 1 week. Jun notified.

## 2021-02-23 ENCOUNTER — ANTI-COAG VISIT (OUTPATIENT)
Dept: FAMILY MEDICINE CLINIC | Age: 86
End: 2021-02-23

## 2021-02-23 DIAGNOSIS — I48.20 CHRONIC ATRIAL FIBRILLATION (HCC): ICD-10-CM

## 2021-02-23 LAB
INR BLD: 2.6
INR BLD: NORMAL
PROTIME: NORMAL

## 2021-03-02 ENCOUNTER — ANTI-COAG VISIT (OUTPATIENT)
Dept: FAMILY MEDICINE CLINIC | Age: 86
End: 2021-03-02

## 2021-03-02 DIAGNOSIS — I48.20 CHRONIC ATRIAL FIBRILLATION (HCC): ICD-10-CM

## 2021-03-02 LAB
INR BLD: 2.4
INR BLD: NORMAL
PROTIME: NORMAL

## 2021-03-09 ENCOUNTER — ANTI-COAG VISIT (OUTPATIENT)
Dept: FAMILY MEDICINE CLINIC | Age: 86
End: 2021-03-09

## 2021-03-09 DIAGNOSIS — I48.20 CHRONIC ATRIAL FIBRILLATION (HCC): ICD-10-CM

## 2021-03-09 LAB
INR BLD: 2.3
INR BLD: NORMAL
PROTIME: NORMAL

## 2021-03-16 ENCOUNTER — ANTI-COAG VISIT (OUTPATIENT)
Dept: FAMILY MEDICINE CLINIC | Age: 86
End: 2021-03-16

## 2021-03-16 DIAGNOSIS — I48.20 CHRONIC ATRIAL FIBRILLATION (HCC): ICD-10-CM

## 2021-03-16 LAB
INR BLD: 2.6
INR BLD: 2.6
PROTIME: NORMAL

## 2021-03-16 NOTE — PROGRESS NOTES
Informed [address] (son) INR today ok at 2.6 same dose recheck 1 week per Marilee Finley.

## 2021-03-23 ENCOUNTER — VIRTUAL VISIT (OUTPATIENT)
Dept: FAMILY MEDICINE CLINIC | Age: 86
End: 2021-03-23
Payer: MEDICARE

## 2021-03-23 ENCOUNTER — ANTI-COAG VISIT (OUTPATIENT)
Dept: FAMILY MEDICINE CLINIC | Age: 86
End: 2021-03-23

## 2021-03-23 ENCOUNTER — TELEPHONE (OUTPATIENT)
Dept: FAMILY MEDICINE CLINIC | Age: 86
End: 2021-03-23

## 2021-03-23 DIAGNOSIS — I48.20 CHRONIC ATRIAL FIBRILLATION (HCC): ICD-10-CM

## 2021-03-23 DIAGNOSIS — L02.91 ABSCESS: Primary | ICD-10-CM

## 2021-03-23 LAB — INR BLD: 2.3

## 2021-03-23 PROCEDURE — 99422 OL DIG E/M SVC 11-20 MIN: CPT | Performed by: NURSE PRACTITIONER

## 2021-03-23 RX ORDER — DOXYCYCLINE HYCLATE 100 MG
100 TABLET ORAL 2 TIMES DAILY
Qty: 20 TABLET | Refills: 0 | Status: SHIPPED | OUTPATIENT
Start: 2021-03-23 | End: 2021-04-02

## 2021-03-23 ASSESSMENT — ENCOUNTER SYMPTOMS
EYE REDNESS: 0
PHOTOPHOBIA: 0
CONSTIPATION: 0
EYE ITCHING: 0
SINUS PAIN: 0
ANAL BLEEDING: 0
ABDOMINAL PAIN: 0
BACK PAIN: 0
APNEA: 0
TROUBLE SWALLOWING: 0
COUGH: 0
SHORTNESS OF BREATH: 0
STRIDOR: 0
RECTAL PAIN: 0
EYE DISCHARGE: 0
NAUSEA: 0
SORE THROAT: 0
EYE PAIN: 0
WHEEZING: 0
VOMITING: 0
CHOKING: 0
DIARRHEA: 0
CHEST TIGHTNESS: 0
COLOR CHANGE: 0
FACIAL SWELLING: 0
SINUS PRESSURE: 0
VOICE CHANGE: 0
BLOOD IN STOOL: 0
RHINORRHEA: 0
ABDOMINAL DISTENTION: 0

## 2021-03-23 ASSESSMENT — PATIENT HEALTH QUESTIONNAIRE - PHQ9
SUM OF ALL RESPONSES TO PHQ QUESTIONS 1-9: 0
SUM OF ALL RESPONSES TO PHQ9 QUESTIONS 1 & 2: 0
1. LITTLE INTEREST OR PLEASURE IN DOING THINGS: 0

## 2021-03-23 NOTE — TELEPHONE ENCOUNTER
Per Rica Gonzalez there is an area/esther on her leg that she has been picking at and it was bleeding so they were putting Neosporin and gauze pad on the area she would like it looked at but there is no way to get her to the office. Virgil Macias can do a virtual visit call transferred to luci weinstein to schedule appt for today.

## 2021-03-23 NOTE — PROGRESS NOTES
3/23/21  Ignacio Watkins : 1928 Sex: female  Age: 80 y.o. Chief Complaint   Patient presents with    Skin Problem     right leg like a boil with puss at bottom of leg, started a week ago,was a dry spot now this needs to know what to use        Patient has area on right lower leg that started as a dry skin patch then blistered and opened. The area is red and has pus to it. The leg is not swollen or hot to touch. No fever, chills, or sweats. No calf pain. This is virtual visit. Review of Systems   Constitutional: Negative for activity change, appetite change, chills, diaphoresis, fatigue, fever and unexpected weight change. HENT: Negative for congestion, dental problem, drooling, ear discharge, ear pain, facial swelling, hearing loss, mouth sores, nosebleeds, postnasal drip, rhinorrhea, sinus pressure, sinus pain, sneezing, sore throat, tinnitus, trouble swallowing and voice change. Eyes: Negative for photophobia, pain, discharge, redness, itching and visual disturbance. Respiratory: Negative for apnea, cough, choking, chest tightness, shortness of breath, wheezing and stridor. Cardiovascular: Negative for chest pain, palpitations and leg swelling. Gastrointestinal: Negative for abdominal distention, abdominal pain, anal bleeding, blood in stool, constipation, diarrhea, nausea, rectal pain and vomiting. Endocrine: Negative for cold intolerance, heat intolerance, polydipsia, polyphagia and polyuria. Genitourinary: Negative for decreased urine volume, difficulty urinating, dysuria, enuresis, flank pain, frequency, genital sores, hematuria and urgency. Musculoskeletal: Negative for arthralgias, back pain, gait problem, joint swelling, myalgias, neck pain and neck stiffness. Skin: Positive for wound. Negative for color change, pallor and rash. Allergic/Immunologic: Negative for environmental allergies, food allergies and immunocompromised state.    Neurological: Negative for dizziness, tremors, seizures, syncope, facial asymmetry, speech difficulty, weakness, light-headedness, numbness and headaches. Hematological: Negative for adenopathy. Does not bruise/bleed easily. Psychiatric/Behavioral: Negative for agitation, behavioral problems, confusion, decreased concentration, hallucinations, self-injury, sleep disturbance and suicidal ideas. The patient is not nervous/anxious and is not hyperactive. Current Outpatient Medications:     doxycycline hyclate (VIBRA-TABS) 100 MG tablet, Take 1 tablet by mouth 2 times daily for 10 days, Disp: 20 tablet, Rfl: 0    mupirocin (BACTROBAN) 2 % ointment, Apply 2 times daily. , Disp: 1 Tube, Rfl: 1    warfarin (COUMADIN) 5 MG tablet, Take 5 mg by mouth Take 1 tablet orally every Monday, Tuesday, Wednesday, Thursday and Friday. Take 1/2 tablet orally every Saturday and Sunday. , Disp: , Rfl:     metoprolol tartrate (LOPRESSOR) 25 MG tablet, take 1 tablet by mouth twice a day, Disp: 180 tablet, Rfl: 1    oxybutynin (DITROPAN) 5 MG tablet, Take 1 tablet by mouth every morning, Disp: 90 tablet, Rfl: 1    allopurinol (ZYLOPRIM) 300 MG tablet, Take 1 tablet by mouth daily, Disp: 90 tablet, Rfl: 1    nitrofurantoin (MACRODANTIN) 50 MG capsule, ONE PER DAY, Disp: 30 capsule, Rfl: 5    omeprazole (PRILOSEC) 20 MG delayed release capsule, take 1 capsule by mouth twice a day, Disp: 60 capsule, Rfl: 5    vitamin B-12 (CYANOCOBALAMIN) 500 MCG tablet, Take 500 mcg by mouth daily, Disp: , Rfl:     acetaminophen (TYLENOL) 325 MG tablet, Take 2 tablets by mouth every 6 hours as needed for Pain, Disp: 120 tablet, Rfl: 3    docusate sodium (COLACE) 100 MG capsule, Take 300 mg by mouth Daily with lunch , Disp: , Rfl:     fexofenadine (ALLEGRA ALLERGY) 180 MG tablet, Take 180 mg by mouth Daily with supper , Disp: , Rfl:   Allergies   Allergen Reactions    Augmentin [Amoxicillin-Pot Clavulanate] Other (See Comments)     Stop kidney function    Crestor [Rosuvastatin]        Past Medical History:   Diagnosis Date    Allergic rhinitis     Asthma     Atrial fibrillation (HCC)     CAD (coronary artery disease)     Diabetes mellitus (HCC)     diet controlled    DJD (degenerative joint disease)     Frequent UTI     Hearing loss     uses aid Right ear    Hyperlipidemia     Hypertension     Liver disease     resolved    Myalgia     Osteopenia     Palmar erythema     Peptic reflux disease     Urinary incontinence     Vitamin B12 deficiency     Wears glasses      Past Surgical History:   Procedure Laterality Date    APPENDECTOMY      CARDIAC PACEMAKER PLACEMENT      COLONOSCOPY      CORONARY ANGIOPLASTY      DIAGNOSTIC CARDIAC CATH LAB PROCEDURE      EYE SURGERY Bilateral     FIXATION KYPHOPLASTY  5/21/15    L1    HIP FRACTURE SURGERY Right 9/10/2020    RIGHT HIP OPEN REDUCTION INTERNAL FIXATION SYNTHES performed by Rosalee Garcia MD at [address] [place] [address] Rd      ovaries removed    JOINT REPLACEMENT Left 5/3/2013    total knee arthroplasty    [facility]    [facility] PARTIAL HIP REPLACEMENT Left 6/21/2018    LEFT HIP HEMIARTHROPLASTY  ++MARIA DOLORES++ performed by Des Hoang MD at [facility] [address] VITRECTOMY Left      Family History   Problem Relation Age of Onset    Diabetes Mother     Coronary Art Dis Mother     Breast Cancer Mother     Diabetes Father     Coronary Art Dis Father     Breast Cancer Sister      Social History     Socioeconomic History    Marital status:       Spouse name: Not on file    Number of children: Not on file    Years of education: Not on file    Highest education level: Not on file   Occupational History    Not on file   Social Needs    Financial resource strain: Not on file    Food insecurity     Worry: Not on file     Inability: Not on file    Transportation needs     Medical: Not on file     Non-medical: Not on file   Tobacco Use    Smoking status: Never Smoker    Smokeless tobacco: Never Used   Substance and Sexual Activity    Alcohol use: No    Drug use: Never    Sexual activity: Never   Lifestyle    Physical activity     Days per week: Not on file     Minutes per session: Not on file    Stress: Not on file   Relationships    Social connections     Talks on phone: Not on file     Gets together: Not on file     Attends Sabianist service: Not on file     Active member of club or organization: Not on file     Attends meetings of clubs or organizations: Not on file     Relationship status: Not on file    Intimate partner violence     Fear of current or ex partner: Not on file     Emotionally abused: Not on file     Physically abused: Not on file     Forced sexual activity: Not on file   Other Topics Concern    Not on file   Social History Narrative    Not on file     Patient Active Problem List   Diagnosis    Cardiac pacemaker - [facility] [employer] [facility]     Hyperlipidemia with target LDL less than 100    Essential hypertension, benign    CAD (coronary artery disease), native coronary artery    Chronic atrial fibrillation    Closed right hip fracture, initial encounter ([facility] [address]    CKD (chronic kidney disease) stage 3, GFR 30-59 ml/min        There were no vitals filed for this visit. Physical Exam  Constitutional:       Appearance: Normal appearance. Skin:     Comments: Open blistered area with pus noted and surrounded erythema. This is virtual exam, hard to tell exact size. Neurological:      Mental Status: She is alert. Assessment and Plan:  Bishop Amos was seen today for skin problem. Diagnoses and all orders for this visit:    Abscess    Other orders  -     doxycycline hyclate (VIBRA-TABS) 100 MG tablet; Take 1 tablet by mouth 2 times daily for 10 days  -     mupirocin (BACTROBAN) 2 % ointment; Apply 2 times daily. Discussions/Education provided to patients during visit:  [] Discussed the importance to stop smoking.   [] Advised to monitor eating habits. [] Reviewed and discussed Imaging results. [] Reviewed and discussed Lab results. [] Discussed the importance of drinking plenty of fluids. [] Cut down on Salt and Caffeine.  [] Exercise 2-3 times weekly, if not more. [] Cut down on Sugar and Fats. [x] Continue Medications as Discussed. [x] Communicated with patient any concerns, to phone office. [x] Follow up as directed. Return in about 1 week (around 3/30/2021).       Seen By:  DELMY Diana - ALONSO

## 2021-03-30 ENCOUNTER — ANTI-COAG VISIT (OUTPATIENT)
Dept: FAMILY MEDICINE CLINIC | Age: 86
End: 2021-03-30

## 2021-03-30 DIAGNOSIS — I48.20 CHRONIC ATRIAL FIBRILLATION (HCC): ICD-10-CM

## 2021-03-30 LAB
INR BLD: 1.8
INR BLD: NORMAL
PROTIME: NORMAL

## 2021-04-01 ENCOUNTER — OFFICE VISIT (OUTPATIENT)
Dept: FAMILY MEDICINE CLINIC | Age: 86
End: 2021-04-01
Payer: MEDICARE

## 2021-04-01 DIAGNOSIS — E78.5 HYPERLIPIDEMIA WITH TARGET LDL LESS THAN 100: Chronic | ICD-10-CM

## 2021-04-01 DIAGNOSIS — Z95.0 CARDIAC PACEMAKER: ICD-10-CM

## 2021-04-01 DIAGNOSIS — R63.0 ANOREXIA: ICD-10-CM

## 2021-04-01 DIAGNOSIS — L03.115 CELLULITIS OF RIGHT LOWER EXTREMITY: Primary | ICD-10-CM

## 2021-04-01 DIAGNOSIS — I48.20 CHRONIC ATRIAL FIBRILLATION (HCC): ICD-10-CM

## 2021-04-01 DIAGNOSIS — N18.30 STAGE 3 CHRONIC KIDNEY DISEASE, UNSPECIFIED WHETHER STAGE 3A OR 3B CKD (HCC): Chronic | ICD-10-CM

## 2021-04-01 DIAGNOSIS — S72.001A CLOSED RIGHT HIP FRACTURE, INITIAL ENCOUNTER (HCC): ICD-10-CM

## 2021-04-01 DIAGNOSIS — I25.10 CORONARY ARTERY DISEASE INVOLVING NATIVE CORONARY ARTERY OF NATIVE HEART WITHOUT ANGINA PECTORIS: Chronic | ICD-10-CM

## 2021-04-01 DIAGNOSIS — I10 ESSENTIAL HYPERTENSION, BENIGN: Chronic | ICD-10-CM

## 2021-04-01 PROCEDURE — 99422 OL DIG E/M SVC 11-20 MIN: CPT | Performed by: NURSE PRACTITIONER

## 2021-04-01 RX ORDER — MIRTAZAPINE 15 MG/1
15 TABLET, FILM COATED ORAL NIGHTLY
Qty: 30 TABLET | Refills: 3 | Status: SHIPPED
Start: 2021-04-01 | End: 2021-07-06 | Stop reason: SDUPTHER

## 2021-04-01 ASSESSMENT — ENCOUNTER SYMPTOMS
APNEA: 0
BLOOD IN STOOL: 0
COUGH: 0
PHOTOPHOBIA: 0
EYE PAIN: 0
VOMITING: 0
CONSTIPATION: 0
ANAL BLEEDING: 0
FACIAL SWELLING: 0
ABDOMINAL PAIN: 0
COLOR CHANGE: 1
TROUBLE SWALLOWING: 0
SORE THROAT: 0
SINUS PAIN: 0
BACK PAIN: 0
EYE ITCHING: 0
ABDOMINAL DISTENTION: 0
WHEEZING: 0
RECTAL PAIN: 0
CHOKING: 0
DIARRHEA: 0
SHORTNESS OF BREATH: 0
VOICE CHANGE: 0
SINUS PRESSURE: 0
EYE DISCHARGE: 0
NAUSEA: 0
CHEST TIGHTNESS: 0
EYE REDNESS: 0
STRIDOR: 0
RHINORRHEA: 0

## 2021-04-01 NOTE — PROGRESS NOTES
21  Ronit Suarez : 1928 Sex: female  Age: 80 y.o. Chief Complaint   Patient presents with    Wound Check     F/U Visit on abscess on right leg. Pt will finish antibiotic tomorrow. Still applying cream to leg. Right leg redness has resolved. Still a scabbed area present. No increase in warmth or swelling. Appetite has been poor. Will sometimes drink a little boost with her pills. Eats mostly potatoes and beans. Denies pain, shortness  Of breath, nausea, vomiting, constipation, or diarrhea. No blood is noted in the stool. Fatigued. Virtual visit      Review of Systems   Constitutional: Positive for appetite change and fatigue. Negative for activity change, chills, diaphoresis, fever and unexpected weight change. HENT: Negative for congestion, dental problem, drooling, ear discharge, ear pain, facial swelling, hearing loss, mouth sores, nosebleeds, postnasal drip, rhinorrhea, sinus pressure, sinus pain, sneezing, sore throat, tinnitus, trouble swallowing and voice change. Eyes: Negative for photophobia, pain, discharge, redness, itching and visual disturbance. Respiratory: Negative for apnea, cough, choking, chest tightness, shortness of breath, wheezing and stridor. Cardiovascular: Negative for chest pain, palpitations and leg swelling. Gastrointestinal: Negative for abdominal distention, abdominal pain, anal bleeding, blood in stool, constipation, diarrhea, nausea, rectal pain and vomiting. Endocrine: Negative for cold intolerance, heat intolerance, polydipsia, polyphagia and polyuria. Genitourinary: Negative for decreased urine volume, difficulty urinating, dysuria, enuresis, flank pain, frequency, genital sores, hematuria and urgency. Musculoskeletal: Negative for arthralgias, back pain, gait problem, joint swelling, myalgias, neck pain and neck stiffness. Skin: Positive for color change and wound. Negative for pallor and rash.    Allergic/Immunologic: Negative for function    Crestor [Rosuvastatin]        Past Medical History:   Diagnosis Date    Allergic rhinitis     Asthma     Atrial fibrillation (HCC)     CAD (coronary artery disease)     Diabetes mellitus (HCC)     diet controlled    DJD (degenerative joint disease)     Frequent UTI     Hearing loss     uses aid Right ear    Hyperlipidemia     Hypertension     Liver disease     resolved    Myalgia     Osteopenia     Palmar erythema     Peptic reflux disease     Urinary incontinence     Vitamin B12 deficiency     Wears glasses      Past Surgical History:   Procedure Laterality Date    APPENDECTOMY      CARDIAC PACEMAKER PLACEMENT      COLONOSCOPY      CORONARY ANGIOPLASTY      DIAGNOSTIC CARDIAC CATH LAB PROCEDURE      EYE SURGERY Bilateral     FIXATION KYPHOPLASTY  5/21/15    L1    HIP FRACTURE SURGERY Right 9/10/2020    RIGHT HIP OPEN REDUCTION INTERNAL FIXATION SYNTHES performed by Delroy Fox MD at [address] [place] [address] [place]      ovaries removed    JOINT REPLACEMENT Left 5/3/2013    total knee arthroplasty    [facility]    [place] PARTIAL HIP REPLACEMENT Left 6/21/2018    LEFT HIP HEMIARTHROPLASTY  ++MARIA DOLORES++ performed by Lyubov Mark MD at [facility] [address] VIT[address]OMY Left      Family History   Problem Relation Age of Onset    Diabetes Mother     Coronary Art Dis Mother     Breast Cancer Mother     Diabetes Father     Coronary Art Dis Father     Breast Cancer Sister      Social History     Socioeconomic History    Marital status:       Spouse name: Not on file    Number of children: Not on file    Years of education: Not on file    Highest education level: Not on file   Occupational History    Not on file   Social Needs    Financial resource strain: Not on file    Food insecurity     Worry: Not on file     Inability: Not on file    Transportation needs     Medical: Not on file     Non-medical: Not on file   Tobacco Use    Smoking status: Never Smoker    Smokeless tobacco: Never Used   Substance and Sexual Activity    Alcohol use: No    Drug use: Never    Sexual activity: Never   Lifestyle    Physical activity     Days per week: Not on file     Minutes per session: Not on file    Stress: Not on file   Relationships    Social connections     Talks on phone: Not on file     Gets together: Not on file     Attends Taoism service: Not on file     Active member of club or organization: Not on file     Attends meetings of clubs or organizations: Not on file     Relationship status: Not on file    Intimate partner violence     Fear of current or ex partner: Not on file     Emotionally abused: Not on file     Physically abused: Not on file     Forced sexual activity: Not on file   Other Topics Concern    Not on file   Social History Narrative    Not on file     Patient Active Problem List   Diagnosis    Cardiac pacemaker - [facility] [employer]     Hyperlipidemia with target LDL less than 100    Essential hypertension, benign    CAD (coronary artery disease), native coronary artery    Chronic atrial fibrillation    Closed right hip fracture, initial encounter ([facility] [address]    CKD (chronic kidney disease) stage 3, GFR 30-59 ml/min        There were no vitals filed for this visit. Physical Exam  Vitals signs and nursing note reviewed. Constitutional:       General: She is not in acute distress. Appearance: Normal appearance. She is normal weight. She is not ill-appearing, toxic-appearing or diaphoretic. Comments: Chronically debilitated   HENT:      Head: Normocephalic and atraumatic. Eyes:      General: No scleral icterus. Right eye: No discharge. Left eye: No discharge. Extraocular Movements: Extraocular movements intact. Conjunctiva/sclera: Conjunctivae normal.      Pupils: Pupils are equal, round, and reactive to light. Abdominal:      General: Abdomen is flat.       Palpations: Abdomen is soft.   Musculoskeletal:         General: Deformity (arthritc changes) present. No tenderness or signs of injury. Right lower leg: No edema. Left lower leg: No edema. Skin:     General: Skin is warm and dry. Coloration: Skin is not jaundiced or pale. Findings: No bruising, erythema, lesion or rash. Comments: Scabbed area to right lower leg. No erythema or increased warmth. Neurological:      General: No focal deficit present. Mental Status: She is alert and oriented to person, place, and time. Mental status is at baseline. Psychiatric:         Mood and Affect: Mood normal.         Behavior: Behavior normal.         Thought Content: Thought content normal.         Judgment: Judgment normal.     cellulitis improved  Anorexia, will add remeron. Hypertension stable    Assessment and Plan:  Elías Salinas was seen today for wound check. Diagnoses and all orders for this visit:    Cellulitis of right lower extremity    Anorexia    Essential hypertension, benign    Coronary artery disease involving native coronary artery of native heart without angina pectoris    Chronic atrial fibrillation    Closed right hip fracture, initial encounter ([facility] [address]    Stage 3 chronic kidney disease, unspecified whether stage 3a or 3b CKD    Hyperlipidemia with target LDL less than 100    Cardiac pacemaker - [facility] [employer]         Discussions/Education provided to patients during visit:  [] Discussed the importance to stop smoking. [] Advised to monitor eating habits. [] Reviewed and discussed Imaging results. [] Reviewed and discussed Lab results. [] Discussed the importance of drinking plenty of fluids. [] Cut down on Salt and Caffeine.  [] Exercise 2-3 times weekly, if not more. [] Cut down on Sugar and Fats. [x] Continue Medications as Discussed. [x] Communicated with patient any concerns, to phone office. [x] Follow up as directed. No follow-ups on file.       Seen By:  Raya Denis APRN - CNP

## 2021-04-06 ENCOUNTER — ANTI-COAG VISIT (OUTPATIENT)
Dept: FAMILY MEDICINE CLINIC | Age: 86
End: 2021-04-06

## 2021-04-06 DIAGNOSIS — I48.20 CHRONIC ATRIAL FIBRILLATION (HCC): ICD-10-CM

## 2021-04-06 LAB
INR BLD: 4
INR BLD: NORMAL
PROTIME: 4 SECONDS

## 2021-04-06 NOTE — PROGRESS NOTES
I spoke with Dr. Carlos Lucero on the phone and he said for pt to hold Warfarin today, tomorrow and Thursday. Then start on Friday with 5 mg 1 tablet daily on Monday, Tuesday, Wednesday and Thursday and 1/2 tablet daily on Friday, Saturday and Sunday. Recheck INR in 1 week (04-).

## 2021-04-13 ENCOUNTER — ANTI-COAG VISIT (OUTPATIENT)
Dept: FAMILY MEDICINE CLINIC | Age: 86
End: 2021-04-13

## 2021-04-13 DIAGNOSIS — I48.20 CHRONIC ATRIAL FIBRILLATION (HCC): ICD-10-CM

## 2021-04-13 LAB
INR BLD: 1.5
INR BLD: NORMAL
PROTIME: NORMAL

## 2021-04-19 ENCOUNTER — TELEPHONE (OUTPATIENT)
Dept: FAMILY MEDICINE CLINIC | Age: 86
End: 2021-04-19

## 2021-04-19 NOTE — TELEPHONE ENCOUNTER
Unique (daughter-in-law) called Jose Eduardo Davis eyes are swollen shut in the morning and has to use a warm compress to get them open - she does not notice any mattered material in her eyes. She states she is having some eye pain due to the swelling. Can you send something to [address].

## 2021-04-20 LAB
INR BLD: 1.8
INR BLD: NORMAL
PROTIME: NORMAL

## 2021-04-21 ENCOUNTER — ANTI-COAG VISIT (OUTPATIENT)
Dept: FAMILY MEDICINE CLINIC | Age: 86
End: 2021-04-21

## 2021-04-21 NOTE — PROGRESS NOTES
Per Patrick, give warfarin 7.5 mg tonight, then resume usual dosing. Repeat lab in 1 week. Kathleen Harry, the son, notified.

## 2021-04-27 ENCOUNTER — ANTI-COAG VISIT (OUTPATIENT)
Dept: FAMILY MEDICINE CLINIC | Age: 86
End: 2021-04-27

## 2021-04-27 DIAGNOSIS — I48.20 CHRONIC ATRIAL FIBRILLATION (HCC): ICD-10-CM

## 2021-04-27 LAB
INR BLD: 1.9
INR BLD: NORMAL
PROTIME: NORMAL

## 2021-04-27 NOTE — PROGRESS NOTES
Reviewed with  via telephone INR today 1.9. Per Juan Land same dose no change since it went high a couple of weeks ago. Recheck INR 1 week.

## 2021-05-04 ENCOUNTER — TELEPHONE (OUTPATIENT)
Dept: FAMILY MEDICINE CLINIC | Age: 86
End: 2021-05-04

## 2021-05-04 LAB — INR BLD: 2.6

## 2021-05-05 ENCOUNTER — ANTI-COAG VISIT (OUTPATIENT)
Dept: FAMILY MEDICINE CLINIC | Age: 86
End: 2021-05-05

## 2021-05-05 LAB
INR BLD: NORMAL
PROTIME: NORMAL

## 2021-05-11 ENCOUNTER — ANTI-COAG VISIT (OUTPATIENT)
Dept: FAMILY MEDICINE CLINIC | Age: 86
End: 2021-05-11

## 2021-05-11 DIAGNOSIS — I48.20 CHRONIC ATRIAL FIBRILLATION (HCC): ICD-10-CM

## 2021-05-11 LAB
INR BLD: 2.4
INR BLD: NORMAL
PROTIME: NORMAL

## 2021-05-11 NOTE — PROGRESS NOTES
Reviewed INR today with  via telephone INR 2.4 good same dose warfarin recheck 1 week. Informed Gil Hunt.

## 2021-05-18 ENCOUNTER — ANTI-COAG VISIT (OUTPATIENT)
Dept: FAMILY MEDICINE CLINIC | Age: 86
End: 2021-05-18

## 2021-05-18 DIAGNOSIS — I48.20 CHRONIC ATRIAL FIBRILLATION (HCC): Primary | ICD-10-CM

## 2021-05-18 LAB
INR BLD: 2.4
INR BLD: NORMAL
PROTIME: NORMAL

## 2021-05-25 LAB
INR BLD: 2.8
INR BLD: NORMAL
PROTIME: NORMAL

## 2021-05-27 ENCOUNTER — ANTI-COAG VISIT (OUTPATIENT)
Dept: FAMILY MEDICINE CLINIC | Age: 86
End: 2021-05-27

## 2021-05-27 DIAGNOSIS — I48.20 CHRONIC ATRIAL FIBRILLATION (HCC): Primary | ICD-10-CM

## 2021-06-01 ENCOUNTER — ANTI-COAG VISIT (OUTPATIENT)
Dept: FAMILY MEDICINE CLINIC | Age: 86
End: 2021-06-01

## 2021-06-01 DIAGNOSIS — E11.9 TYPE 2 DIABETES MELLITUS WITHOUT COMPLICATION, WITHOUT LONG-TERM CURRENT USE OF INSULIN (HCC): ICD-10-CM

## 2021-06-01 DIAGNOSIS — E78.5 HYPERLIPIDEMIA WITH TARGET LDL LESS THAN 100: ICD-10-CM

## 2021-06-01 DIAGNOSIS — M25.559 HIP PAIN: ICD-10-CM

## 2021-06-01 DIAGNOSIS — I48.20 CHRONIC ATRIAL FIBRILLATION (HCC): Primary | ICD-10-CM

## 2021-06-01 LAB
INR BLD: 2.2
INR BLD: NORMAL
PROTIME: NORMAL

## 2021-06-01 RX ORDER — TRAMADOL HYDROCHLORIDE 50 MG/1
50 TABLET ORAL 2 TIMES DAILY
Qty: 60 TABLET | Refills: 0 | Status: SHIPPED | OUTPATIENT
Start: 2021-06-01 | End: 2021-07-01

## 2021-06-08 ENCOUNTER — ANTI-COAG VISIT (OUTPATIENT)
Dept: FAMILY MEDICINE CLINIC | Age: 86
End: 2021-06-08

## 2021-06-08 DIAGNOSIS — I48.20 CHRONIC ATRIAL FIBRILLATION (HCC): Primary | ICD-10-CM

## 2021-06-08 LAB
INR BLD: 1.7
INR BLD: NORMAL
PROTIME: 1.7 SECONDS

## 2021-06-08 NOTE — PROGRESS NOTES
Reviewed INR results with Dr. Lesa Ceballos via telephone INR 1.7 give extra 1/2 tab of Warfarin 5 mg today only resume same dose and recheck 1 week. I left this message on [employer] as will be leaving the office soon and I wanted to be sure she had the extra medication today.

## 2021-06-15 LAB
INR BLD: 2
INR BLD: NORMAL
PROTIME: NORMAL

## 2021-06-16 RX ORDER — CHOLECALCIFEROL (VITAMIN D3) 125 MCG
500 CAPSULE ORAL DAILY
Qty: 90 TABLET | Refills: 1 | Status: SHIPPED | OUTPATIENT
Start: 2021-06-16 | End: 2021-09-23

## 2021-06-17 ENCOUNTER — ANTI-COAG VISIT (OUTPATIENT)
Dept: FAMILY MEDICINE CLINIC | Age: 86
End: 2021-06-17

## 2021-06-17 ENCOUNTER — TELEPHONE (OUTPATIENT)
Dept: FAMILY MEDICINE CLINIC | Age: 86
End: 2021-06-17

## 2021-06-17 DIAGNOSIS — I48.20 CHRONIC ATRIAL FIBRILLATION (HCC): Primary | ICD-10-CM

## 2021-06-17 NOTE — PROGRESS NOTES
Virgil Calhoun received copy of INR done on 06-, good at 2.0 same dose per  verbal order recheck 1 week.

## 2021-06-22 ENCOUNTER — ANTI-COAG VISIT (OUTPATIENT)
Dept: FAMILY MEDICINE CLINIC | Age: 86
End: 2021-06-22

## 2021-06-22 DIAGNOSIS — I48.20 CHRONIC ATRIAL FIBRILLATION (HCC): Primary | ICD-10-CM

## 2021-06-22 LAB
INR BLD: 1.8
INR BLD: NORMAL
PROTIME: NORMAL

## 2021-06-22 NOTE — PROGRESS NOTES
Reviewed INR 06- with Dr. Trudy Rosas via telephone today 1.8. Per Dr. Trudy Rosas no change same dose recheck 1 week. Informed Haresh Acosta.

## 2021-06-29 LAB
INR BLD: 2.5
INR BLD: NORMAL
PROTIME: NORMAL

## 2021-06-30 ENCOUNTER — ANTI-COAG VISIT (OUTPATIENT)
Dept: FAMILY MEDICINE CLINIC | Age: 86
End: 2021-06-30

## 2021-07-06 ENCOUNTER — ANTI-COAG VISIT (OUTPATIENT)
Dept: FAMILY MEDICINE CLINIC | Age: 86
End: 2021-07-06

## 2021-07-06 DIAGNOSIS — I48.20 CHRONIC ATRIAL FIBRILLATION (HCC): Primary | ICD-10-CM

## 2021-07-06 LAB
INR BLD: 1.6
INR BLD: NORMAL
PROTIME: NORMAL

## 2021-07-06 RX ORDER — WARFARIN SODIUM 5 MG/1
TABLET ORAL
Qty: 72 TABLET | Refills: 1 | Status: SHIPPED | OUTPATIENT
Start: 2021-07-06

## 2021-07-06 RX ORDER — ALLOPURINOL 300 MG/1
300 TABLET ORAL DAILY
Qty: 90 TABLET | Refills: 1 | Status: SHIPPED
Start: 2021-07-06 | End: 2021-12-15 | Stop reason: SDUPTHER

## 2021-07-06 RX ORDER — MIRTAZAPINE 15 MG/1
15 TABLET, FILM COATED ORAL NIGHTLY
Qty: 90 TABLET | Refills: 1 | Status: SHIPPED | OUTPATIENT
Start: 2021-07-06

## 2021-07-06 RX ORDER — WARFARIN SODIUM 5 MG/1
5 TABLET ORAL
COMMUNITY
End: 2021-07-06 | Stop reason: SDUPTHER

## 2021-07-06 RX ORDER — NITROFURANTOIN MACROCRYSTALS 50 MG/1
CAPSULE ORAL
Qty: 90 CAPSULE | Refills: 1 | Status: SHIPPED
Start: 2021-07-06 | End: 2021-12-15 | Stop reason: SDUPTHER

## 2021-07-06 NOTE — TELEPHONE ENCOUNTER
Patients last appointment Visit date not found. Patients next scheduled appointment No future appointments.  Last seen 04-

## 2021-07-06 NOTE — PROGRESS NOTES
Spoke with Dr. Florence Huitron via telephone INR 1.6 Give extra warfain 2.5 mg today only. I informed Vee Doss (daughter-in-law) and asked her for current warfarin dose and she stated 5 mg 1 tab Monday through Friday and 5 mg 1/2 tab Saturday and Sunday. I will make that correction and recheck INR 1 week.

## 2021-07-13 ENCOUNTER — ANTI-COAG VISIT (OUTPATIENT)
Dept: FAMILY MEDICINE CLINIC | Age: 86
End: 2021-07-13

## 2021-07-13 DIAGNOSIS — I48.20 CHRONIC ATRIAL FIBRILLATION (HCC): Primary | ICD-10-CM

## 2021-07-13 LAB
INR BLD: 1.4
INR BLD: NORMAL
PROTIME: 1.4 SECONDS

## 2021-07-13 NOTE — PROGRESS NOTES
Informed Cecy Mary (son) INR 1.4 give extra 2.5 mg today only, continue same dose receheck INR 1 week. Per Lokesh Frances, aprn-Cnp.

## 2021-07-20 ENCOUNTER — ANTI-COAG VISIT (OUTPATIENT)
Dept: FAMILY MEDICINE CLINIC | Age: 86
End: 2021-07-20

## 2021-07-20 DIAGNOSIS — I48.20 CHRONIC ATRIAL FIBRILLATION (HCC): Primary | ICD-10-CM

## 2021-07-20 LAB
INR BLD: 1.5
INR BLD: NORMAL
PROTIME: NORMAL

## 2021-07-20 NOTE — PROGRESS NOTES
Reviewed INR today 1.5 with Penny Serra give warfarin 10 mg today only and then begin warfarin 5 mg daily. I informed Laith Shelton and he does not want to do that feels it will be too much warfarin will do warfarin 5 mg 1 Sunday through Friday and 5 mg 1/2 on Saturday.

## 2021-07-27 ENCOUNTER — ANTI-COAG VISIT (OUTPATIENT)
Dept: FAMILY MEDICINE CLINIC | Age: 86
End: 2021-07-27

## 2021-07-27 DIAGNOSIS — I48.20 CHRONIC ATRIAL FIBRILLATION (HCC): Primary | ICD-10-CM

## 2021-07-27 LAB
INR BLD: 2.2
INR BLD: 2.2
PROTIME: NORMAL

## 2021-07-27 NOTE — PROGRESS NOTES
Home INR today is normal at 2.2 same dose recheck INR 1 week per Dr. Mil Damian, left message on [employer].

## 2021-08-03 ENCOUNTER — ANTI-COAG VISIT (OUTPATIENT)
Dept: FAMILY MEDICINE CLINIC | Age: 86
End: 2021-08-03

## 2021-08-03 DIAGNOSIS — I48.20 CHRONIC ATRIAL FIBRILLATION (HCC): Primary | ICD-10-CM

## 2021-08-03 LAB
INR BLD: 2.9
INR BLD: NORMAL
PROTIME: NORMAL

## 2021-08-03 NOTE — PROGRESS NOTES
INR today 2.9 within range same dose of warfarin recheck INR weekly per Dr. Yanick Alanis. Virgil Calhoun.

## 2021-08-10 ENCOUNTER — ANTI-COAG VISIT (OUTPATIENT)
Dept: FAMILY MEDICINE CLINIC | Age: 86
End: 2021-08-10

## 2021-08-10 DIAGNOSIS — I48.20 CHRONIC ATRIAL FIBRILLATION (HCC): Primary | ICD-10-CM

## 2021-08-10 LAB
INR BLD: 2.5
INR BLD: NORMAL
PROTIME: NORMAL

## 2021-08-17 ENCOUNTER — ANTI-COAG VISIT (OUTPATIENT)
Dept: FAMILY MEDICINE CLINIC | Age: 86
End: 2021-08-17

## 2021-08-17 DIAGNOSIS — I48.20 CHRONIC ATRIAL FIBRILLATION (HCC): Primary | ICD-10-CM

## 2021-08-17 LAB
INR BLD: 3.3
INR BLD: NORMAL
PROTIME: 3.3 SECONDS

## 2021-08-17 NOTE — PROGRESS NOTES
Home INR today 3.3 reviewed with  via telephone hold 2 days continue same dose recheck 1 week. Informed Roselyn Liner.

## 2021-08-23 DIAGNOSIS — R31.9 HEMATURIA, UNSPECIFIED TYPE: Primary | ICD-10-CM

## 2021-08-23 DIAGNOSIS — R30.0 DYSURIA: ICD-10-CM

## 2021-08-23 DIAGNOSIS — R41.0 CONFUSION: Primary | ICD-10-CM

## 2021-08-23 LAB
BILIRUBIN, POC: NEGATIVE
BLOOD URINE, POC: NORMAL
CLARITY, POC: NORMAL
COLOR, POC: NORMAL
GLUCOSE URINE, POC: NEGATIVE
KETONES, POC: NEGATIVE
LEUKOCYTE EST, POC: NORMAL
NITRITE, POC: NEGATIVE
PH, POC: 5.5
PROTEIN, POC: NORMAL
SPECIFIC GRAVITY, POC: 1.03
UROBILINOGEN, POC: NORMAL

## 2021-08-23 PROCEDURE — 81002 URINALYSIS NONAUTO W/O SCOPE: CPT | Performed by: FAMILY MEDICINE

## 2021-08-23 RX ORDER — CIPROFLOXACIN 500 MG/1
500 TABLET, FILM COATED ORAL 2 TIMES DAILY
Qty: 20 TABLET | Refills: 0 | Status: SHIPPED | OUTPATIENT
Start: 2021-08-23 | End: 2021-09-02

## 2021-08-23 NOTE — TELEPHONE ENCOUNTER
Informed Jon Almazan (son) Rx sent hold nitrofurantoin while on Cipro and do INR 2 times a week while on Cipro.

## 2021-08-24 ENCOUNTER — ANTI-COAG VISIT (OUTPATIENT)
Dept: FAMILY MEDICINE CLINIC | Age: 86
End: 2021-08-24

## 2021-08-24 DIAGNOSIS — I48.20 CHRONIC ATRIAL FIBRILLATION (HCC): Primary | ICD-10-CM

## 2021-08-24 LAB
INR BLD: 1.7
INR BLD: NORMAL
PROTIME: 1.7 SECONDS

## 2021-08-24 NOTE — PROGRESS NOTES
Reviewed INR 08- with Dr. Thais Gr via telephone INR 1.7 but she just started Cipro for UTI and will be checking INR on Friday. Per Dr. Thais Gr same dose, Informed Alda Rico (son).

## 2021-08-26 ENCOUNTER — ANTI-COAG VISIT (OUTPATIENT)
Dept: FAMILY MEDICINE CLINIC | Age: 86
End: 2021-08-26

## 2021-08-26 DIAGNOSIS — I48.20 CHRONIC ATRIAL FIBRILLATION (HCC): Primary | ICD-10-CM

## 2021-08-26 LAB
INR BLD: 1.5
INR BLD: NORMAL
PROTIME: NORMAL
URINE CULTURE, ROUTINE: NORMAL

## 2021-08-26 NOTE — PROGRESS NOTES
Keyana Santoro advised of INR, reviewed pt dosing of Warfarin. Pt. To take 1/2 tab (2.5 mg) more tonight of Warfarin and then resume regular dosing. Repeat INR in 1 week.

## 2021-08-30 ENCOUNTER — TELEPHONE (OUTPATIENT)
Dept: FAMILY MEDICINE CLINIC | Age: 86
End: 2021-08-30

## 2021-08-30 DIAGNOSIS — R44.3 HALLUCINATION: ICD-10-CM

## 2021-08-30 DIAGNOSIS — F41.9 ANXIETY: Primary | ICD-10-CM

## 2021-08-30 RX ORDER — LORAZEPAM 0.5 MG/1
0.5 TABLET ORAL DAILY PRN
COMMUNITY
End: 2021-08-30 | Stop reason: SDUPTHER

## 2021-08-30 RX ORDER — LORAZEPAM 0.5 MG/1
0.5 TABLET ORAL DAILY PRN
Qty: 30 TABLET | Refills: 0 | Status: SHIPPED | OUTPATIENT
Start: 2021-08-30 | End: 2021-09-29

## 2021-08-30 NOTE — TELEPHONE ENCOUNTER
Per Cecy Mary (son) patient is being treated for a UTI is on Cipro. She is having hallucinations, looking for snakes in the house and people are in her house wanting to hurt her. Is there anything you can do or give the patient.

## 2021-08-31 ENCOUNTER — ANTI-COAG VISIT (OUTPATIENT)
Dept: FAMILY MEDICINE CLINIC | Age: 86
End: 2021-08-31

## 2021-08-31 DIAGNOSIS — I48.20 CHRONIC ATRIAL FIBRILLATION (HCC): Primary | ICD-10-CM

## 2021-08-31 LAB
INR BLD: 2.2
INR BLD: NORMAL
PROTIME: 2.2 SECONDS

## 2021-09-02 ENCOUNTER — TELEPHONE (OUTPATIENT)
Dept: FAMILY MEDICINE CLINIC | Age: 86
End: 2021-09-02

## 2021-09-02 ENCOUNTER — TELEPHONE (OUTPATIENT)
Dept: FAMILY MEDICINE CLINIC | Age: 86
End: 2021-09-02
Payer: MEDICARE

## 2021-09-02 ENCOUNTER — APPOINTMENT (OUTPATIENT)
Dept: CT IMAGING | Age: 86
End: 2021-09-02
Payer: MEDICARE

## 2021-09-02 VITALS
SYSTOLIC BLOOD PRESSURE: 105 MMHG | RESPIRATION RATE: 14 BRPM | HEART RATE: 79 BPM | OXYGEN SATURATION: 99 % | WEIGHT: 124 LBS | TEMPERATURE: 98 F | HEIGHT: 64 IN | DIASTOLIC BLOOD PRESSURE: 65 MMHG | BODY MASS INDEX: 21.17 KG/M2

## 2021-09-02 DIAGNOSIS — N39.0 URINARY TRACT INFECTION WITH HEMATURIA, SITE UNSPECIFIED: ICD-10-CM

## 2021-09-02 DIAGNOSIS — N39.0 URINARY TRACT INFECTION WITH HEMATURIA, SITE UNSPECIFIED: Primary | ICD-10-CM

## 2021-09-02 DIAGNOSIS — R31.9 URINARY TRACT INFECTION WITH HEMATURIA, SITE UNSPECIFIED: ICD-10-CM

## 2021-09-02 DIAGNOSIS — R31.9 URINARY TRACT INFECTION WITH HEMATURIA, SITE UNSPECIFIED: Primary | ICD-10-CM

## 2021-09-02 DIAGNOSIS — R31.9 HEMATURIA, UNSPECIFIED TYPE: Primary | ICD-10-CM

## 2021-09-02 LAB
BILIRUBIN, POC: NEGATIVE
BLOOD URINE, POC: NORMAL
CLARITY, POC: NORMAL
COLOR, POC: NORMAL
GLUCOSE URINE, POC: NEGATIVE
KETONES, POC: NEGATIVE
LEUKOCYTE EST, POC: NORMAL
NITRITE, POC: NEGATIVE
PH, POC: 6
PROTEIN, POC: NORMAL
SPECIFIC GRAVITY, POC: >=1.03
UROBILINOGEN, POC: NORMAL

## 2021-09-02 PROCEDURE — 4500000002 HC ER NO CHARGE

## 2021-09-02 PROCEDURE — 70450 CT HEAD/BRAIN W/O DYE: CPT

## 2021-09-02 PROCEDURE — 81002 URINALYSIS NONAUTO W/O SCOPE: CPT | Performed by: FAMILY MEDICINE

## 2021-09-02 PROCEDURE — 99283 EMERGENCY DEPT VISIT LOW MDM: CPT

## 2021-09-02 NOTE — TELEPHONE ENCOUNTER
Margo wanting to know results of Amanda's Ua? Last night Amanda fell, did not get hurt, she was seeing things and talking to people who were not there, tried to get out of bed. Rena Nails I knew Radha Montero had sent Ua result to Dr. Sacha Garcia, I do not see a comment from Dr. Sacha Garcia. Please advise. AND Isaac Newell to do now with Shira Suttonle- take her to ER, Change her Lorazepam med or stop it? Go back on Macrodantin daily until this C+S comes back? Dr. Sacha Garcia talked to Anahi Roper, take Amanda to ER.

## 2021-09-02 NOTE — ED TRIAGE NOTES
FIRST PROVIDER CONTACT ASSESSMENT NOTE      Department of Emergency Medicine   Admit Date: No admission date for patient encounter. Chief Complaint: Altered Mental Status (finished cipro for UTI recently), Hallucinations, and Fall (fell out of bed last pm pt on coumadin)      History of Present Illness:    Riccardo Dugan is a 80 y.o. female who presents to the ED for altered mental status. Son at bedside gives HPI. States his mom gets frequent UTIs. Started show symptoms of confusion. Seen at PCP, positive UTI. Just finished 10 days of Cipro. Son states that she is still confused. She has been hallucinating. She is on Coumadin. Has fallen a couple times in the past few days.         -----------------END OF FIRST PROVIDER CONTACT ASSESSMENT NOTE--------------  Electronically signed by JAMIR Lopez   DD: 9/2/21

## 2021-09-02 NOTE — TELEPHONE ENCOUNTER
Per Chani Capone (son) Patient finished cipro is still having hallucinations lorazepam not helping so he dropped off another urine to be tested.

## 2021-09-03 ENCOUNTER — HOSPITAL ENCOUNTER (EMERGENCY)
Age: 86
Discharge: LEFT BEFORE DISCHARGE | End: 2021-09-03
Payer: MEDICARE

## 2021-09-03 ENCOUNTER — TELEPHONE (OUTPATIENT)
Dept: FAMILY MEDICINE CLINIC | Age: 86
End: 2021-09-03

## 2021-09-03 LAB — URINE CULTURE, ROUTINE: NORMAL

## 2021-09-03 NOTE — TELEPHONE ENCOUNTER
Son Jevon Mendoza called. He took her to [address] [place] [address] like you recommended last night. They were really backed up. They were there almost seven hours. They were told that CT scan was negative. They did labs but after waiting that much time they took her home. He is asking you to look up the labs that were done and see if anything is off and what would you suggest? She is acting a little better today than yesterday. He has a lot of questions regarding her medications. His number is [phone]. Thanks.

## 2021-09-07 ENCOUNTER — ANTI-COAG VISIT (OUTPATIENT)
Dept: FAMILY MEDICINE CLINIC | Age: 86
End: 2021-09-07

## 2021-09-07 ENCOUNTER — TELEPHONE (OUTPATIENT)
Dept: FAMILY MEDICINE CLINIC | Age: 86
End: 2021-09-07

## 2021-09-07 DIAGNOSIS — I48.20 CHRONIC ATRIAL FIBRILLATION (HCC): Primary | ICD-10-CM

## 2021-09-07 LAB
INR BLD: 2.5
INR BLD: NORMAL
PROTIME: NORMAL

## 2021-09-07 NOTE — TELEPHONE ENCOUNTER
I spoke to Zeinab Emiliano (daughter-in-law) and she stated patient is not having hallucinations anymore - she feels it was the strong antibiotic she was on for the UTI.

## 2021-09-07 NOTE — TELEPHONE ENCOUNTER
----- Message from Lilia Bradford sent at 9/3/2021  6:38 PM EDT -----  Subject: Message to Provider    QUESTIONS  Information for Provider? Gin Sender is feeling slightly better (not   hallucinating) and Rex Mullen was returning the call from the office. ---------------------------------------------------------------------------  --------------  Tami WATERS  What is the best way for the office to contact you? OK to leave message on   voicemail  Preferred Call Back Phone Number? [phone]  ---------------------------------------------------------------------------  --------------  SCRIPT ANSWERS  Relationship to Patient? Third Party  Representative Name?  Aiyana Germain

## 2021-09-14 ENCOUNTER — TELEPHONE (OUTPATIENT)
Dept: FAMILY MEDICINE CLINIC | Age: 86
End: 2021-09-14

## 2021-09-14 ENCOUNTER — ANTI-COAG VISIT (OUTPATIENT)
Dept: FAMILY MEDICINE CLINIC | Age: 86
End: 2021-09-14

## 2021-09-14 DIAGNOSIS — I48.20 CHRONIC ATRIAL FIBRILLATION (HCC): Primary | ICD-10-CM

## 2021-09-14 LAB
INR BLD: 2.4
INR BLD: NORMAL
PROTIME: 2.4 SECONDS

## 2021-09-14 NOTE — TELEPHONE ENCOUNTER
Per Unique they would like to have a visiting nurse come in because patient is feeling down does not want to get out of bed, they would like someone to come in and reassure her she is not going to die within the next 15 minutes. Informed Unique patient would have to be seen to have a face to face to order the Visiting Nurse. She stated she will discuss with family.

## 2021-09-23 ENCOUNTER — TELEPHONE (OUTPATIENT)
Dept: FAMILY MEDICINE CLINIC | Age: 86
End: 2021-09-23

## 2021-09-23 ENCOUNTER — OFFICE VISIT (OUTPATIENT)
Dept: FAMILY MEDICINE CLINIC | Age: 86
End: 2021-09-23
Payer: MEDICARE

## 2021-09-23 VITALS
OXYGEN SATURATION: 96 % | TEMPERATURE: 96.9 F | HEART RATE: 81 BPM | DIASTOLIC BLOOD PRESSURE: 68 MMHG | BODY MASS INDEX: 21.28 KG/M2 | SYSTOLIC BLOOD PRESSURE: 114 MMHG | RESPIRATION RATE: 18 BRPM | HEIGHT: 64 IN

## 2021-09-23 DIAGNOSIS — N18.30 STAGE 3 CHRONIC KIDNEY DISEASE, UNSPECIFIED WHETHER STAGE 3A OR 3B CKD (HCC): ICD-10-CM

## 2021-09-23 DIAGNOSIS — I48.20 CHRONIC ATRIAL FIBRILLATION (HCC): ICD-10-CM

## 2021-09-23 DIAGNOSIS — R79.1 SUPRATHERAPEUTIC INR: Primary | ICD-10-CM

## 2021-09-23 DIAGNOSIS — D68.9 COAGULOPATHY (HCC): ICD-10-CM

## 2021-09-23 DIAGNOSIS — I25.10 CORONARY ARTERY DISEASE INVOLVING NATIVE CORONARY ARTERY OF NATIVE HEART WITHOUT ANGINA PECTORIS: ICD-10-CM

## 2021-09-23 LAB
INTERNATIONAL NORMALIZATION RATIO, POC: 8
PROTHROMBIN TIME, POC: 96

## 2021-09-23 PROCEDURE — 4040F PNEUMOC VAC/ADMIN/RCVD: CPT | Performed by: NURSE PRACTITIONER

## 2021-09-23 PROCEDURE — 1036F TOBACCO NON-USER: CPT | Performed by: NURSE PRACTITIONER

## 2021-09-23 PROCEDURE — 99214 OFFICE O/P EST MOD 30 MIN: CPT | Performed by: NURSE PRACTITIONER

## 2021-09-23 PROCEDURE — 1123F ACP DISCUSS/DSCN MKR DOCD: CPT | Performed by: NURSE PRACTITIONER

## 2021-09-23 PROCEDURE — 1090F PRES/ABSN URINE INCON ASSESS: CPT | Performed by: NURSE PRACTITIONER

## 2021-09-23 PROCEDURE — 85610 PROTHROMBIN TIME: CPT | Performed by: NURSE PRACTITIONER

## 2021-09-23 PROCEDURE — 36415 COLL VENOUS BLD VENIPUNCTURE: CPT | Performed by: NURSE PRACTITIONER

## 2021-09-23 PROCEDURE — G8420 CALC BMI NORM PARAMETERS: HCPCS | Performed by: NURSE PRACTITIONER

## 2021-09-23 PROCEDURE — G8427 DOCREV CUR MEDS BY ELIG CLIN: HCPCS | Performed by: NURSE PRACTITIONER

## 2021-09-23 RX ORDER — TRAMADOL HYDROCHLORIDE 50 MG/1
50 TABLET ORAL EVERY 6 HOURS PRN
COMMUNITY
End: 2021-10-08 | Stop reason: SDUPTHER

## 2021-09-23 SDOH — ECONOMIC STABILITY: FOOD INSECURITY: WITHIN THE PAST 12 MONTHS, YOU WORRIED THAT YOUR FOOD WOULD RUN OUT BEFORE YOU GOT MONEY TO BUY MORE.: NEVER TRUE

## 2021-09-23 SDOH — ECONOMIC STABILITY: FOOD INSECURITY: WITHIN THE PAST 12 MONTHS, THE FOOD YOU BOUGHT JUST DIDN'T LAST AND YOU DIDN'T HAVE MONEY TO GET MORE.: NEVER TRUE

## 2021-09-23 ASSESSMENT — ENCOUNTER SYMPTOMS
EYE ITCHING: 0
DIARRHEA: 0
EYE PAIN: 0
APNEA: 0
BLOOD IN STOOL: 0
CHEST TIGHTNESS: 0
FACIAL SWELLING: 0
TROUBLE SWALLOWING: 0
EYE REDNESS: 0
BACK PAIN: 1
SHORTNESS OF BREATH: 0
CHOKING: 0
VOMITING: 0
EYE DISCHARGE: 0
RECTAL PAIN: 0
SINUS PAIN: 0
ANAL BLEEDING: 0
VOICE CHANGE: 0
ABDOMINAL PAIN: 0
PHOTOPHOBIA: 0
CONSTIPATION: 0
SORE THROAT: 0
WHEEZING: 0
COLOR CHANGE: 0
COUGH: 0
NAUSEA: 0
ABDOMINAL DISTENTION: 0
SINUS PRESSURE: 0
RHINORRHEA: 0
STRIDOR: 0

## 2021-09-23 ASSESSMENT — SOCIAL DETERMINANTS OF HEALTH (SDOH): HOW HARD IS IT FOR YOU TO PAY FOR THE VERY BASICS LIKE FOOD, HOUSING, MEDICAL CARE, AND HEATING?: SOMEWHAT HARD

## 2021-09-23 NOTE — TELEPHONE ENCOUNTER
INR machine not recording. He called INR machine company and they said try it on someone else, if it works then [employer]' INR is so high that machine can't read it. They did try it on someone else and it worked. So he held her Coumadin for a couple days, today machine is still not working. Can he bring [employer] in to have her INR checked today? Appt.  Given today with Elham 1;45PM

## 2021-09-23 NOTE — PROGRESS NOTES
21  Suzan Plumas : 1928 Sex: female  Age: 80 y.o. Chief Complaint   Patient presents with    Coagulation Disorder     INR 8.0       Patient is here today because family has checked her INR at home and the machine is not reading after several attempts. They called the company that makes the machine. They told them to try it on someone else and it showed a reading. They told him her INR was probably too high to read on the machine and she should come to the doctor to get checked. Patient was on Cipro for a UTI earlier in the month but has been done with that since 21. She has not taken any Aleve, Ibuprofen,, Naproxen, or ASA. Her appetite has been low but unchanged diet. Only recent medication changes have been addition of tramadol and claritan. Review of Systems   Constitutional: Positive for appetite change and fatigue. Negative for activity change, chills, diaphoresis, fever and unexpected weight change. HENT: Negative for congestion, dental problem, drooling, ear discharge, ear pain, facial swelling, hearing loss, mouth sores, nosebleeds, postnasal drip, rhinorrhea, sinus pressure, sinus pain, sneezing, sore throat, tinnitus, trouble swallowing and voice change. Eyes: Negative for photophobia, pain, discharge, redness, itching and visual disturbance. Respiratory: Negative for apnea, cough, choking, chest tightness, shortness of breath, wheezing and stridor. Cardiovascular: Negative for chest pain, palpitations and leg swelling. Gastrointestinal: Negative for abdominal distention, abdominal pain, anal bleeding, blood in stool, constipation, diarrhea, nausea, rectal pain and vomiting. Endocrine: Negative for cold intolerance, heat intolerance, polydipsia, polyphagia and polyuria. Genitourinary: Negative for decreased urine volume, difficulty urinating, dysuria, enuresis, flank pain, frequency, genital sores, hematuria and urgency.    Musculoskeletal: Positive for arthralgias, back pain and gait problem. Negative for joint swelling, myalgias, neck pain and neck stiffness. Skin: Negative for color change, pallor, rash and wound. Allergic/Immunologic: Negative for environmental allergies, food allergies and immunocompromised state. Neurological: Negative for dizziness, tremors, seizures, syncope, facial asymmetry, speech difficulty, weakness, light-headedness, numbness and headaches. Hematological: Negative for adenopathy. Bruises/bleeds easily. Psychiatric/Behavioral: Negative for agitation, behavioral problems, confusion, decreased concentration, hallucinations, self-injury, sleep disturbance and suicidal ideas. The patient is not nervous/anxious and is not hyperactive. Current Outpatient Medications:     traMADol (ULTRAM) 50 MG tablet, Take 50 mg by mouth every 6 hours as needed for Pain., Disp: , Rfl:     LORazepam (ATIVAN) 0.5 MG tablet, Take 1 tablet by mouth daily as needed for Anxiety for up to 30 days. , Disp: 30 tablet, Rfl: 0    mirtazapine (REMERON) 15 MG tablet, Take 1 tablet by mouth nightly, Disp: 90 tablet, Rfl: 1    allopurinol (ZYLOPRIM) 300 MG tablet, Take 1 tablet by mouth daily, Disp: 90 tablet, Rfl: 1    nitrofurantoin (MACRODANTIN) 50 MG capsule, ONE PER DAY, Disp: 90 capsule, Rfl: 1    warfarin (COUMADIN) 5 MG tablet, Take 1 tablet orally every Monday, Tuesday, Wednesday, Thursday, Friday and take 1/2 tablet orally every Saturday and Sunday.  (Patient taking differently: 5 mg Take 1 tablet orally every Sunday, Monday, Tuesday, Wednesday, Thursday, Friday and Saturday take 1/2 tablet orally every Sunday.), Disp: 72 tablet, Rfl: 1    vitamin B-12 (CYANOCOBALAMIN) 500 MCG tablet, Take 1 tablet by mouth daily, Disp: 90 tablet, Rfl: 1    neomycin-polymyxin-dexamethasone (MAXITROL) 0.1 % ophthalmic suspension, 1  Drop 4 x day x 5 days, Disp: 2 mL, Rfl: 0    metoprolol tartrate (LOPRESSOR) 25 MG tablet, take 1 tablet by mouth twice a day, Disp: 180 tablet, Rfl: 1    oxybutynin (DITROPAN) 5 MG tablet, Take 1 tablet by mouth every morning, Disp: 90 tablet, Rfl: 1    omeprazole (PRILOSEC) 20 MG delayed release capsule, take 1 capsule by mouth twice a day, Disp: 60 capsule, Rfl: 5    acetaminophen (TYLENOL) 325 MG tablet, Take 2 tablets by mouth every 6 hours as needed for Pain, Disp: 120 tablet, Rfl: 3    docusate sodium (COLACE) 100 MG capsule, Take 300 mg by mouth Daily with lunch , Disp: , Rfl:   Allergies   Allergen Reactions    Augmentin [Amoxicillin-Pot Clavulanate] Other (See Comments)     Stop kidney function    Crestor [Rosuvastatin]        Past Medical History:   Diagnosis Date    Allergic rhinitis     Asthma     Atrial fibrillation (HCC)     CAD (coronary artery disease)     Diabetes mellitus ([facility]     diet controlled    DJD (degenerative joint disease)     Frequent UTI     Hearing loss     uses aid Right ear    Hyperlipidemia     Hypertension     Liver disease     resolved    Myalgia     Osteopenia     Palmar erythema     Peptic reflux disease     Urinary incontinence     Vitamin B12 deficiency     Wears glasses      Past Surgical History:   Procedure Laterality Date    APPENDECTOMY      CARDIAC PACEMAKER PLACEMENT      COLONOSCOPY      CORONARY ANGIOPLASTY      DIAGNOSTIC CARDIAC CATH LAB PROCEDURE      EYE SURGERY Bilateral     FIXATION KYPHOPLASTY  5/21/15    L1    HIP FRACTURE SURGERY Right 9/10/2020    RIGHT HIP OPEN REDUCTION INTERNAL FIXATION SYNTHES performed by Shama Rodriguez MD at [address] [place] [address] [place]      ovaries removed    JOINT REPLACEMENT Left 5/3/2013    total knee arthroplasty    [facility]EMENT  [facility]    ID PARTIAL HIP REPLACEMENT Left 6/21/2018    LEFT HIP HEMIARTHROPLASTY  ++MARIA DOLORES++ performed by Prabhakar Roberson MD at [facility] OR    TONSILLECTOMY      VITRECTOMY Left      Family History   Problem Relation Age of Onset    Diabetes Mother     Coronary Art Dis Mother  Breast Cancer Mother     Diabetes Father     Coronary Art Dis Father     Breast Cancer Sister      Social History     Socioeconomic History    Marital status:      Spouse name: Not on file    Number of children: Not on file    Years of education: Not on file    Highest education level: Not on file   Occupational History    Not on file   Tobacco Use    Smoking status: Never Smoker    Smokeless tobacco: Never Used   Vaping Use    Vaping Use: Never used   Substance and Sexual Activity    Alcohol use: No    Drug use: Never    Sexual activity: Never   Other Topics Concern    Not on file   Social History Narrative    Not on file     Social Determinants of Health     Financial Resource Strain: Medium Risk    Difficulty of Paying Living Expenses: Somewhat hard   Food Insecurity: No Food Insecurity    Worried About Running Out of Food in the Last Year: Never true    Lenny of Food in the Last Year: Never true   Transportation Needs:     Lack of Transportation (Medical):      Lack of Transportation (Non-Medical):    Physical Activity:     Days of Exercise per Week:     Minutes of Exercise per Session:    Stress:     Feeling of Stress :    Social Connections:     Frequency of Communication with Friends and Family:     Frequency of Social Gatherings with Friends and Family:     Attends Druze Services:     Active Member of Clubs or Organizations:     Attends Club or Organization Meetings:     Marital Status:    Intimate Partner Violence:     Fear of Current or Ex-Partner:     Emotionally Abused:     Physically Abused:     Sexually Abused:      Patient Active Problem List   Diagnosis    Cardiac pacemaker - [facility] [employer] SR     Hyperlipidemia with target LDL less than 100    Essential hypertension, benign    CAD (coronary artery disease), native coronary artery    Chronic atrial fibrillation    [facility]    CKD (chronic kidney disease) stage 3, GFR 30-59 ml/min ([facility])        Vitals:    09/23/21 1409   BP: 114/68   Pulse: 81   Resp: 18   Temp: 96.9 °F (36.1 °C)   TempSrc: Temporal   SpO2: 96%   Weight: Comment: pt could not stand to get weighed   Height: 5' 4\" (1.626 m)       Physical Exam  Vitals and nursing note reviewed. Constitutional:       General: She is not in acute distress. Appearance: Normal appearance. She is normal weight. She is ill-appearing. She is not toxic-appearing or diaphoretic. HENT:      Head: Normocephalic and atraumatic. Right Ear: Tympanic membrane, ear canal and external ear normal. There is no impacted cerumen. Left Ear: Tympanic membrane, ear canal and external ear normal. There is no impacted cerumen. Nose: Nose normal. No congestion or rhinorrhea. Mouth/Throat:      Mouth: Mucous membranes are moist.      Pharynx: Oropharynx is clear. No oropharyngeal exudate or posterior oropharyngeal erythema. Eyes:      General: No scleral icterus. Right eye: No discharge. Left eye: No discharge. Extraocular Movements: Extraocular movements intact. Conjunctiva/sclera: Conjunctivae normal.      Pupils: Pupils are equal, round, and reactive to light. Neck:      Vascular: No carotid bruit. Cardiovascular:      Rate and Rhythm: Normal rate. Rhythm irregular. Pulses: Normal pulses. Heart sounds: Murmur heard. No friction rub. No gallop. Pulmonary:      Effort: Pulmonary effort is normal. No respiratory distress. Breath sounds: No stridor. No wheezing, rhonchi or rales. Chest:      Chest wall: No tenderness. Abdominal:      General: Abdomen is flat. Bowel sounds are normal. There is no distension. Palpations: Abdomen is soft. There is no mass. Tenderness: There is no abdominal tenderness. There is no right CVA tenderness, left CVA tenderness, guarding or rebound. Hernia: No hernia is present.    Musculoskeletal: General: No swelling, tenderness, deformity or signs of injury. Normal range of motion. Cervical back: Normal range of motion and neck supple. No rigidity. No muscular tenderness. Right lower leg: No edema. Left lower leg: No edema. Lymphadenopathy:      Cervical: No cervical adenopathy. Skin:     General: Skin is warm and dry. Capillary Refill: Capillary refill takes less than 2 seconds. Coloration: Skin is not jaundiced or pale. Findings: Bruising present. No erythema, lesion or rash. Comments: Venous insufficiency   Neurological:      General: No focal deficit present. Mental Status: She is alert and oriented to person, place, and time. Mental status is at baseline. Cranial Nerves: No cranial nerve deficit. Sensory: No sensory deficit. Motor: Weakness present. Coordination: Coordination normal.      Gait: Gait normal.      Deep Tendon Reflexes: Reflexes normal.   Psychiatric:         Mood and Affect: Mood normal.         Behavior: Behavior normal.         Thought Content: Thought content normal.         Judgment: Judgment normal.         Assessment and Plan:  Rey Cesar was seen today for coagulation disorder. Diagnoses and all orders for this visit:    Supratherapeutic INR    Chronic atrial fibrillation  -     POCT INR  -     CBC Auto Differential; Future  -     Comprehensive Metabolic Panel; Future  -     APTT; Future  -     PROTIME-INR; Future    Stage 3 chronic kidney disease, unspecified whether stage 3a or 3b CKD (HCC)  -     POCT INR  -     CBC Auto Differential; Future  -     Comprehensive Metabolic Panel; Future  -     APTT; Future  -     PROTIME-INR; Future    Coronary artery disease involving native coronary artery of native heart without angina pectoris  -     POCT INR  -     CBC Auto Differential; Future  -     Comprehensive Metabolic Panel; Future  -     APTT; Future  -     PROTIME-INR;  Future    Coagulopathy (HCC)  -     CBC Auto

## 2021-09-24 ENCOUNTER — TELEPHONE (OUTPATIENT)
Dept: FAMILY MEDICINE CLINIC | Age: 86
End: 2021-09-24

## 2021-09-24 ENCOUNTER — TELEPHONE (OUTPATIENT)
Dept: FAMILY MEDICINE CLINIC | Age: 86
End: 2021-09-24
Payer: MEDICARE

## 2021-09-24 ENCOUNTER — APPOINTMENT (OUTPATIENT)
Dept: CT IMAGING | Age: 86
End: 2021-09-24
Payer: MEDICARE

## 2021-09-24 ENCOUNTER — HOSPITAL ENCOUNTER (EMERGENCY)
Age: 86
Discharge: HOME | End: 2021-09-24
Attending: EMERGENCY MEDICINE
Payer: MEDICARE

## 2021-09-24 VITALS
RESPIRATION RATE: 18 BRPM | WEIGHT: 124 LBS | TEMPERATURE: 97.9 F | BODY MASS INDEX: 21.17 KG/M2 | DIASTOLIC BLOOD PRESSURE: 78 MMHG | OXYGEN SATURATION: 98 % | HEART RATE: 84 BPM | SYSTOLIC BLOOD PRESSURE: 128 MMHG | HEIGHT: 64 IN

## 2021-09-24 DIAGNOSIS — N39.0 FREQUENT UTI: Primary | ICD-10-CM

## 2021-09-24 DIAGNOSIS — R79.1 SUPRATHERAPEUTIC INR: Primary | ICD-10-CM

## 2021-09-24 LAB
ANION GAP SERPL CALCULATED.3IONS-SCNC: 10 MMOL/L (ref 7–16)
BASOPHILS ABSOLUTE: 0.04 E9/L (ref 0–0.2)
BASOPHILS RELATIVE PERCENT: 0.8 % (ref 0–2)
BILIRUBIN, POC: NEGATIVE
BLOOD URINE, POC: NORMAL
BUN BLDV-MCNC: 14 MG/DL (ref 6–23)
CALCIUM SERPL-MCNC: 8.8 MG/DL (ref 8.6–10.2)
CHLORIDE BLD-SCNC: 104 MMOL/L (ref 98–107)
CLARITY, POC: NORMAL
CO2: 23 MMOL/L (ref 22–29)
COLOR, POC: NORMAL
CREAT SERPL-MCNC: 0.7 MG/DL (ref 0.5–1)
EOSINOPHILS ABSOLUTE: 0.18 E9/L (ref 0.05–0.5)
EOSINOPHILS RELATIVE PERCENT: 3.6 % (ref 0–6)
GFR AFRICAN AMERICAN: >60
GFR NON-AFRICAN AMERICAN: >60 ML/MIN/1.73
GLUCOSE BLD-MCNC: 100 MG/DL (ref 74–99)
GLUCOSE URINE, POC: NEGATIVE
HCT VFR BLD CALC: 37.8 % (ref 34–48)
HEMOGLOBIN: 12.5 G/DL (ref 11.5–15.5)
IMMATURE GRANULOCYTES #: 0.02 E9/L
IMMATURE GRANULOCYTES %: 0.4 % (ref 0–5)
INR BLD: >10
KETONES, POC: NEGATIVE
LEUKOCYTE EST, POC: NORMAL
LYMPHOCYTES ABSOLUTE: 0.73 E9/L (ref 1.5–4)
LYMPHOCYTES RELATIVE PERCENT: 14.7 % (ref 20–42)
MCH RBC QN AUTO: 32.6 PG (ref 26–35)
MCHC RBC AUTO-ENTMCNC: 33.1 % (ref 32–34.5)
MCV RBC AUTO: 98.7 FL (ref 80–99.9)
MONOCYTES ABSOLUTE: 0.37 E9/L (ref 0.1–0.95)
MONOCYTES RELATIVE PERCENT: 7.5 % (ref 2–12)
NEUTROPHILS ABSOLUTE: 3.62 E9/L (ref 1.8–7.3)
NEUTROPHILS RELATIVE PERCENT: 73 % (ref 43–80)
NITRITE, POC: NEGATIVE
PDW BLD-RTO: 14.1 FL (ref 11.5–15)
PH, POC: 6
PLATELET # BLD: 197 E9/L (ref 130–450)
PMV BLD AUTO: 11 FL (ref 7–12)
POTASSIUM REFLEX MAGNESIUM: 4 MMOL/L (ref 3.5–5)
PROTEIN, POC: NEGATIVE
PROTHROMBIN TIME: 115.5 SEC (ref 9.3–12.4)
RBC # BLD: 3.83 E12/L (ref 3.5–5.5)
SODIUM BLD-SCNC: 137 MMOL/L (ref 132–146)
SPECIFIC GRAVITY, POC: 1.01
UROBILINOGEN, POC: NORMAL
WBC # BLD: 5 E9/L (ref 4.5–11.5)

## 2021-09-24 PROCEDURE — 70450 CT HEAD/BRAIN W/O DYE: CPT

## 2021-09-24 PROCEDURE — 85025 COMPLETE CBC W/AUTO DIFF WBC: CPT

## 2021-09-24 PROCEDURE — 72125 CT NECK SPINE W/O DYE: CPT

## 2021-09-24 PROCEDURE — 80048 BASIC METABOLIC PNL TOTAL CA: CPT

## 2021-09-24 PROCEDURE — 81002 URINALYSIS NONAUTO W/O SCOPE: CPT | Performed by: FAMILY MEDICINE

## 2021-09-24 PROCEDURE — 6370000000 HC RX 637 (ALT 250 FOR IP): Performed by: EMERGENCY MEDICINE

## 2021-09-24 PROCEDURE — 85610 PROTHROMBIN TIME: CPT

## 2021-09-24 PROCEDURE — 99284 EMERGENCY DEPT VISIT MOD MDM: CPT

## 2021-09-24 RX ORDER — PHYTONADIONE 5 MG/1
5 TABLET ORAL ONCE
Status: COMPLETED | OUTPATIENT
Start: 2021-09-24 | End: 2021-09-24

## 2021-09-24 RX ADMIN — PHYTONADIONE 5 MG: 5 TABLET ORAL at 16:42

## 2021-09-24 NOTE — ED PROVIDER NOTES
[facility]  Department of Emergency Medicine   ED  Encounter Note  Admit Date/RoomTime: 2021  1:41 PM  ED Room: [facility] [address]    NAME: Hilario Carter  : 1928  MRN: [id number]     Chief Complaint:  Abnormal Lab (INR elevated)    History of Present Illness       Hilario Carter is a 80 y.o. old female who presents to the emergency department for evaluation of elevated INR. She is on Coumadin for history of atrial fibrillation. I was able to speak with her son as well. He and a caregiver manage her medications. He is adamant that she has not received any Coumadin for the past 5 days. They have a machine at home that checks her INR. It has not registered as it has been too high. She saw her family doctor yesterday and was told that her INR was 8. She was advised to come to the ED today. Son is under the impression we can give IV medication that will fix her INR in a few hours and that she can go home. Patient is on chronic daily Macrobid to suppress UTIs. She did have a course of ciprofloxacin 2 weeks ago for urine infection. She has had no other medication changes. Son states she ate spinach last night as well to try to improve her INR. He states there is no way that she is accidentally taking the medication without his knowledge. Patient does say that she fell a few days ago and hit the back of her head. She has no obvious trauma on examination. .  ROS   Pertinent positives and negatives are stated within HPI, all other systems reviewed and are negative. Past Medical History:  has a past medical history of Allergic rhinitis, Asthma, Atrial fibrillation ([address] CAD (coronary artery disease), Diabetes mellitus ([address] DJD (degenerative joint disease), Frequent UTI, Hearing loss, Hyperlipidemia, Hypertension, Liver disease, Myalgia, Osteopenia, Palmar erythema, Peptic reflux disease, Urinary incontinence, Vitamin B12 deficiency, and Wears glasses.     Surgical History:  has a past surgical history that includes Diagnostic Cardiac Cath Lab Procedure; Coronary angioplasty; Cardiac pacemaker placement; joint replacement (Left, 5/3/2013); pacemaker placement (2006); Colonoscopy; Hysterectomy; Fixation Kyphoplasty (5/21/15); pr partial hip replacement (Left, 6/21/2018); eye surgery (Bilateral); Appendectomy; Tonsillectomy; vitrectomy (Left); and Hip fracture surgery (Right, 9/10/2020). Social History:  reports that she has never smoked. She has never used smokeless tobacco. She reports that she does not drink alcohol and does not use drugs. Family History: family history includes Breast Cancer in her mother and sister; Coronary Art Dis in her father and mother; Diabetes in her father and mother. Allergies: Augmentin [amoxicillin-pot clavulanate] and Crestor [rosuvastatin]    Physical Exam   Oxygen Saturation Interpretation: Normal.        ED Triage Vitals [09/24/21 1039]   BP Temp Temp Source Pulse Resp SpO2 Height Weight   126/79 97.9 °F (36.6 °C) Temporal 82 14 100 % 5' 4\" (1.626 m) 124 lb (56.2 kg)         Constitutional:  Alert, development consistent with age. HEENT:  NC/NT. Airway patent. Neck:  Normal ROM. Supple. Respiratory:  Clear to auscultation and breath sounds equal.  CV:  Regular rate and rhythm, normal heart sounds, without pathological murmurs, ectopy, gallops, or rubs. GI:  AbdomenSoft, nontender, good bowel sounds. No firm or pulsatile mass. Back:  No costovertebral tenderness. Musculoskeletal: No tenderness or edema noted. Integument:  Normal turgor. Warm, dry, without visible rash, unless noted elsewhere. Lymphatics: No lymphangitis or adenopathy noted. Neurological:  Oriented. Motor functions intact.      Lab / Imaging Results   (All laboratory and radiology results have been personally reviewed by myself)  Labs:  Results for orders placed or performed during the hospital encounter of 09/24/21   CBC Auto Differential   Result Value Ref Range    WBC 5.0 4.5 - 11.5 E9/L    RBC 3.83 3.50 - 5.50 E12/L    Hemoglobin 12.5 11.5 - 15.5 g/dL    Hematocrit 37.8 34.0 - 48.0 %    MCV 98.7 80.0 - 99.9 fL    MCH 32.6 26.0 - 35.0 pg    MCHC 33.1 32.0 - 34.5 %    RDW 14.1 11.5 - 15.0 fL    Platelets 572 950 - 351 E9/L    MPV 11.0 7.0 - 12.0 fL    Neutrophils % 73.0 43.0 - 80.0 %    Immature Granulocytes % 0.4 0.0 - 5.0 %    Lymphocytes % 14.7 (L) 20.0 - 42.0 %    Monocytes % 7.5 2.0 - 12.0 %    Eosinophils % 3.6 0.0 - 6.0 %    Basophils % 0.8 0.0 - 2.0 %    Neutrophils Absolute 3.62 1.80 - 7.30 E9/L    Immature Granulocytes # 0.02 E9/L    Lymphocytes Absolute 0.73 (L) 1.50 - 4.00 E9/L    Monocytes Absolute 0.37 0.10 - 0.95 E9/L    Eosinophils Absolute 0.18 0.05 - 0.50 E9/L    Basophils Absolute 0.04 0.00 - 0.20 M6/S   Basic Metabolic Panel w/ Reflex to MG   Result Value Ref Range    Sodium 137 132 - 146 mmol/L    Potassium reflex Magnesium 4.0 3.5 - 5.0 mmol/L    Chloride 104 98 - 107 mmol/L    CO2 23 22 - 29 mmol/L    Anion Gap 10 7 - 16 mmol/L    Glucose 100 (H) 74 - 99 mg/dL    BUN 14 6 - 23 mg/dL    CREATININE 0.7 0.5 - 1.0 mg/dL    GFR Non-African American >60 >=60 mL/min/1.73    GFR African American >60     Calcium 8.8 8.6 - 10.2 mg/dL   Protime-INR   Result Value Ref Range    Protime 115.5 (H) 9.3 - 12.4 sec    INR >10.0 (HH)      Imaging: All Radiology results interpreted by Radiologist unless otherwise noted. CT HEAD WO CONTRAST   Final Result   1. No acute intracranial abnormality. 2. Mild chronic small vessel ischemic disease. 3. Nonspecific cerebellar volume loss can be seen with such entities as   chronic anticonvulsant therapy and alcohol abuse. 4. Mucosal disease of the paranasal sinuses. CT CERVICAL SPINE WO CONTRAST   Final Result   1. No acute fracture or subluxation. 2. Degenerative disc disease and facet arthropathy in the cervical spine with   multilevel neural foraminal narrowing and no definite central canal stenosis. Discharge Medication List as of 9/24/2021  4:31 PM        Electronically signed by Mercy Gonsalves DO   DD: 9/24/21  **This report was transcribed using voice recognition software. Every effort was made to ensure accuracy; however, inadvertent computerized transcription errors may be present.   END OF PROVIDER NOTE        Mercy Gonsalves DO  09/24/21 1956

## 2021-09-24 NOTE — ED NOTES
Pts son at [facility] speaking with Dr. Malia Rose.       Remberto Moreno, BERNARDO  09/24/21 4112

## 2021-09-24 NOTE — TELEPHONE ENCOUNTER
Dr. Dumont Persons - Patient saw ramone bautista in office yesterday and she ordered a U/A since she had a UTI recently.

## 2021-09-24 NOTE — ED NOTES
FIRST PROVIDER CONTACT ASSESSMENT NOTE      Department of Emergency Medicine   9/24/21  10:40 AM EDT    Chief Complaint: Abnormal Lab (INR elevated)      History of Present Illness:   Bailey Amaya is a 80 y.o. female who presents to the ED for an INR of greater than 10. Has not taken it since Monday. Ate a bag of spinach last night. Denies any shortness breath or chest pain. Denies any falls or trauma. Denies any signs or  symptoms of bleeding. Medical History:  has a past medical history of Allergic rhinitis, Asthma, Atrial fibrillation ([address] CAD (coronary artery disease), Diabetes mellitus ([address] DJD (degenerative joint disease), Frequent UTI, Hearing loss, Hyperlipidemia, Hypertension, Liver disease, Myalgia, Osteopenia, Palmar erythema, Peptic reflux disease, Urinary incontinence, Vitamin B12 deficiency, and Wears glasses. Surgical History:  has a past surgical history that includes Diagnostic Cardiac Cath Lab Procedure; Coronary angioplasty; Cardiac pacemaker placement; joint replacement (Left, 5/3/2013); pacemaker placement (2006); Colonoscopy; Hysterectomy; Fixation Kyphoplasty (5/21/15); pr partial hip replacement (Left, 6/21/2018); eye surgery (Bilateral); Appendectomy; Tonsillectomy; vitrectomy (Left); and Hip fracture surgery (Right, 9/10/2020). Social History:  reports that she has never smoked. She has never used smokeless tobacco. She reports that she does not drink alcohol and does not use drugs. Family History: family history includes Breast Cancer in her mother and sister; Coronary Art Dis in her father and mother; Diabetes in her father and mother.     *ALLERGIES*     Augmentin [amoxicillin-pot clavulanate] and Crestor [rosuvastatin]     Physical Exam:      VS:  /79   Pulse 82   Temp 97.9 °F (36.6 °C) (Temporal)   Resp 14   Ht 5' 4\" (1.626 m)   Wt 124 lb (56.2 kg)   SpO2 100%   BMI 21.28 kg/m²      Initial Plan of Care:  Initiate Treatment-Testing, Proceed toTreatment Area When Bed Available for ED Attending/MLP to Continue Care    -----------------END OF FIRST PROVIDER CONTACT ASSESSMENT NOTE--------------  Electronically signed by DELMY Wade CNP   DD: 9/24/21             DELMY Wade CNP  09/24/21 1531 DELMY Miguel CNP  09/24/21 1042

## 2021-09-25 LAB
INR BLD: NORMAL
PROTIME: NORMAL

## 2021-09-27 ENCOUNTER — ANTI-COAG VISIT (OUTPATIENT)
Dept: FAMILY MEDICINE CLINIC | Age: 86
End: 2021-09-27

## 2021-09-27 DIAGNOSIS — I48.20 CHRONIC ATRIAL FIBRILLATION (HCC): Primary | ICD-10-CM

## 2021-09-27 LAB — INR BLD: 2

## 2021-09-27 NOTE — PROGRESS NOTES
Dr. Pinky Og spoke with Yasir Rodriguez (son) today regarding INR today at 2.0 due to recent elevated INR going to begin Warfarin 5mg 1/2 daily. Correction INR was done on 09-. Orders still stand as above.

## 2021-09-28 ENCOUNTER — TELEPHONE (OUTPATIENT)
Dept: FAMILY MEDICINE CLINIC | Age: 86
End: 2021-09-28

## 2021-09-28 NOTE — TELEPHONE ENCOUNTER
Patients last appointment Visit date not found. Patients next scheduled appointment No future appointments. Last seen 09-.

## 2021-09-29 ENCOUNTER — ANTI-COAG VISIT (OUTPATIENT)
Dept: FAMILY MEDICINE CLINIC | Age: 86
End: 2021-09-29

## 2021-09-29 ENCOUNTER — TELEPHONE (OUTPATIENT)
Dept: FAMILY MEDICINE CLINIC | Age: 86
End: 2021-09-29

## 2021-09-29 LAB
INR BLD: 2.5
INR BLD: NORMAL
PROTIME: 2.5 SECONDS

## 2021-09-29 RX ORDER — OXYBUTYNIN CHLORIDE 5 MG/1
5 TABLET ORAL EVERY MORNING
Qty: 90 TABLET | Refills: 1 | Status: SHIPPED | OUTPATIENT
Start: 2021-09-29

## 2021-09-29 NOTE — PROGRESS NOTES
Per Dr. Maggi Lopez , now INR is within range, STOP Warfarin all together. Pt. Is to take ASA 81 mg daily. Jun advised. He said he will check her INR Saturday. 10-2-21.

## 2021-09-29 NOTE — TELEPHONE ENCOUNTER
Today INR is 2.5. So advised as yesterday when INR within normal range STOP taking Warfarin and take ASA 81 mg daily. Jun notified.

## 2021-10-03 LAB
INR BLD: 1.4
INR BLD: NORMAL
PROTIME: 1.4 SECONDS

## 2021-10-04 ENCOUNTER — ANTI-COAG VISIT (OUTPATIENT)
Dept: FAMILY MEDICINE CLINIC | Age: 86
End: 2021-10-04

## 2021-10-04 DIAGNOSIS — I48.20 CHRONIC ATRIAL FIBRILLATION (HCC): Primary | ICD-10-CM

## 2021-10-04 NOTE — PROGRESS NOTES
Informed Mauro Peterson INR today is 1.4 she does not have to check the INR now since warfarin was stopped and her INR is in normal range and she is only on ASA 81 mg daily per Dr. Neeraj Loredo. Anticoagulation episode resolved.

## 2021-10-05 ENCOUNTER — TELEPHONE (OUTPATIENT)
Dept: FAMILY MEDICINE CLINIC | Age: 86
End: 2021-10-05

## 2021-10-05 NOTE — TELEPHONE ENCOUNTER
Per Adina Mcleod INR today was 1.3. I informed her that patient is no longer on warfarin; therefore, she does not need to do INRs anymore and the family was told. She stated that they just ordered more supplies. She is going to call the patient because if she is not on warfarin insurance will not cover supplies.

## 2021-10-08 DIAGNOSIS — S72.001A CLOSED RIGHT HIP FRACTURE, INITIAL ENCOUNTER (HCC): Primary | ICD-10-CM

## 2021-10-08 RX ORDER — TRAMADOL HYDROCHLORIDE 50 MG/1
50 TABLET ORAL EVERY 6 HOURS PRN
Qty: 120 TABLET | Refills: 0 | Status: SHIPPED | OUTPATIENT
Start: 2021-10-08 | End: 2021-11-07

## 2021-10-19 ENCOUNTER — TELEPHONE (OUTPATIENT)
Dept: FAMILY MEDICINE CLINIC | Age: 86
End: 2021-10-19

## 2021-11-19 ENCOUNTER — TELEPHONE (OUTPATIENT)
Dept: FAMILY MEDICINE CLINIC | Age: 86
End: 2021-11-19
Payer: MEDICARE

## 2021-11-19 DIAGNOSIS — R31.9 HEMATURIA, UNSPECIFIED TYPE: Primary | ICD-10-CM

## 2021-11-19 LAB
BILIRUBIN, POC: NEGATIVE
BLOOD URINE, POC: NORMAL
CLARITY, POC: NORMAL
COLOR, POC: NORMAL
GLUCOSE URINE, POC: NEGATIVE
KETONES, POC: NEGATIVE
LEUKOCYTE EST, POC: NORMAL
NITRITE, POC: NEGATIVE
PH, POC: 6.5
PROTEIN, POC: NEGATIVE
SPECIFIC GRAVITY, POC: 1.02
UROBILINOGEN, POC: NORMAL

## 2021-11-19 PROCEDURE — 81002 URINALYSIS NONAUTO W/O SCOPE: CPT | Performed by: FAMILY MEDICINE

## 2021-11-19 RX ORDER — SULFAMETHOXAZOLE AND TRIMETHOPRIM 400; 80 MG/1; MG/1
1 TABLET ORAL 2 TIMES DAILY
Qty: 20 TABLET | Refills: 0 | Status: SHIPPED | OUTPATIENT
Start: 2021-11-19 | End: 2021-11-29

## 2021-11-19 NOTE — TELEPHONE ENCOUNTER
Per [place] (son) patient is not acting like herself she is mean. Wants Urinre test for UTI. Call [employer] cell phone. Uses [facility] [employer].

## 2021-11-22 LAB
ORGANISM: ABNORMAL
URINE CULTURE, ROUTINE: ABNORMAL

## 2021-12-15 RX ORDER — OMEPRAZOLE 20 MG/1
CAPSULE, DELAYED RELEASE ORAL
Qty: 60 CAPSULE | Refills: 5 | Status: SHIPPED | OUTPATIENT
Start: 2021-12-15

## 2021-12-15 RX ORDER — NITROFURANTOIN MACROCRYSTALS 50 MG/1
CAPSULE ORAL
Qty: 90 CAPSULE | Refills: 1 | Status: SHIPPED | OUTPATIENT
Start: 2021-12-15

## 2021-12-15 RX ORDER — ALLOPURINOL 300 MG/1
300 TABLET ORAL DAILY
Qty: 90 TABLET | Refills: 1 | Status: SHIPPED
Start: 2021-12-15 | End: 2022-07-13 | Stop reason: SDUPTHER

## 2021-12-30 ENCOUNTER — TELEPHONE (OUTPATIENT)
Dept: FAMILY MEDICINE CLINIC | Age: 86
End: 2021-12-30
Payer: MEDICARE

## 2021-12-30 DIAGNOSIS — R31.9 HEMATURIA, UNSPECIFIED TYPE: ICD-10-CM

## 2021-12-30 DIAGNOSIS — N39.0 CHRONIC UTI: Primary | ICD-10-CM

## 2021-12-30 LAB
BILIRUBIN, POC: NEGATIVE
BLOOD URINE, POC: NORMAL
CLARITY, POC: NORMAL
COLOR, POC: NORMAL
GLUCOSE URINE, POC: NEGATIVE
KETONES, POC: NEGATIVE
LEUKOCYTE EST, POC: NEGATIVE
NITRITE, POC: NORMAL
PH, POC: 6.5
PROTEIN, POC: NEGATIVE
SPECIFIC GRAVITY, POC: 1.02
UROBILINOGEN, POC: NORMAL

## 2021-12-30 PROCEDURE — 81002 URINALYSIS NONAUTO W/O SCOPE: CPT | Performed by: FAMILY MEDICINE

## 2022-01-02 LAB — URINE CULTURE, ROUTINE: NORMAL

## 2022-01-04 ENCOUNTER — TELEPHONE (OUTPATIENT)
Dept: FAMILY MEDICINE CLINIC | Age: 87
End: 2022-01-04

## 2022-01-04 NOTE — TELEPHONE ENCOUNTER
I talked to Ilene Maloney   and I will sign wed and you can make some calls thanks Please pend for [facility] hospice and we will refer

## 2022-01-04 NOTE — TELEPHONE ENCOUNTER
Dr. Yannick Vargas left a message on my voicemail he would like you to call him at [phone]. His mother is going down hill she will not eat or take her medications - she states she wants to die. He would like to talk to you about Hospice.

## 2022-01-26 ENCOUNTER — TELEPHONE (OUTPATIENT)
Dept: NON INVASIVE DIAGNOSTICS | Age: 87
End: 2022-01-26

## 2022-01-26 NOTE — TELEPHONE ENCOUNTER
Received a call from Elvia Pham. Ankit wanted to know what type of device Swati Lvangel has. I informed Ankit patient has a single chamber pacemaker. We have not seen Swati Caceres since 8/27/2019. There are no special needs with the patient's device in hospice care.     Stephenie Campos RN, BSN  Jesus Alberto

## 2022-07-12 ENCOUNTER — TELEPHONE (OUTPATIENT)
Dept: FAMILY MEDICINE CLINIC | Age: 87
End: 2022-07-12

## 2022-07-12 DIAGNOSIS — R35.0 URINARY FREQUENCY: ICD-10-CM

## 2022-07-12 DIAGNOSIS — R35.0 URINARY FREQUENCY: Primary | ICD-10-CM

## 2022-07-12 LAB
BACTERIA: ABNORMAL /HPF
BILIRUBIN URINE: NEGATIVE
BLOOD, URINE: NEGATIVE
CLARITY: CLEAR
COLOR: YELLOW
GLUCOSE URINE: NEGATIVE MG/DL
KETONES, URINE: NEGATIVE MG/DL
LEUKOCYTE ESTERASE, URINE: ABNORMAL
NITRITE, URINE: NEGATIVE
PH UA: 6 (ref 5–9)
PROTEIN UA: NEGATIVE MG/DL
RBC UA: ABNORMAL /HPF (ref 0–2)
SPECIFIC GRAVITY UA: 1.02 (ref 1–1.03)
UROBILINOGEN, URINE: 1 E.U./DL
WBC UA: ABNORMAL /HPF (ref 0–5)

## 2022-07-12 NOTE — TELEPHONE ENCOUNTER
Mary Lou Dhillon from [facility] called Khadra Vaughn son is concerned she has a UTI due to more frequent confusion ,wants to know what can she do or can something be prescribed,or an order put in for culture.   She would like a phone call back at [phone]

## 2022-07-12 NOTE — TELEPHONE ENCOUNTER
Yes call her back lets do a u a and urine c and  s   have her get back to us with u a  result and if infection I will go ahead and treat it before we get the culture back

## 2022-07-13 ENCOUNTER — TELEPHONE (OUTPATIENT)
Dept: FAMILY MEDICINE CLINIC | Age: 87
End: 2022-07-13

## 2022-07-13 RX ORDER — ALLOPURINOL 300 MG/1
300 TABLET ORAL DAILY
Qty: 90 TABLET | Refills: 1 | Status: SHIPPED | OUTPATIENT
Start: 2022-07-13

## 2022-07-13 NOTE — TELEPHONE ENCOUNTER
Thu called and was wanting to know the results of pts urinalysis. Results are back just need you to review and let us know what to treat her with.

## 2022-07-13 NOTE — TELEPHONE ENCOUNTER
Pts hospice nurse notified she states she will let them know and let them decide if they want her to be seen

## 2022-07-14 LAB — URINE CULTURE, ROUTINE: NORMAL

## (undated) DEVICE — GUIDEWIRE ORTH DIA2.8MM 300/150MM CALIB W/ FLUT FOR 6.5MM

## (undated) DEVICE — TOWEL,OR,DSP,ST,BLUE,STD,6/PK,12PK/CS: Brand: MEDLINE

## (undated) DEVICE — Device

## (undated) DEVICE — INTENDED FOR TISSUE SEPARATION, AND OTHER PROCEDURES THAT REQUIRE A SHARP SURGICAL BLADE TO PUNCTURE OR CUT.: Brand: BARD-PARKER ® STAINLESS STEEL BLADES

## (undated) DEVICE — NEEDLE HYPO 25GA L1.5IN BLU POLYPR HUB S STL REG BVL STR

## (undated) DEVICE — Z DISCONTINUED PER MEDLINE USE 2741942 DRESSING AQUACEL 6 IN ALG W9XL15CM SIL CVR WTRPRF VIR BACT BARR ANTIMIC

## (undated) DEVICE — GOWN,SIRUS,FABRNF,XL,20/CS: Brand: MEDLINE

## (undated) DEVICE — PACK PROCEDURE SURG GEN CUST

## (undated) DEVICE — GLOVE SURG SZ 65 THK91MIL LTX FREE SYN POLYISOPRENE

## (undated) DEVICE — BIT DRL L300MM DIA5MM CANN QUIK CPL ADJ STP REUSE

## (undated) DEVICE — DRESSING,GAUZE,XEROFORM,CURAD,1"X8",ST: Brand: CURAD

## (undated) DEVICE — SOLUTION IV IRRIG WATER 1000ML POUR BRL 2F7114

## (undated) DEVICE — 3M™ STERI-DRAPE™ U-DRAPE 1015: Brand: STERI-DRAPE™

## (undated) DEVICE — DOUBLE BASIN SET: Brand: MEDLINE INDUSTRIES, INC.

## (undated) DEVICE — SYRINGE, LUER LOCK, 10ML: Brand: MEDLINE

## (undated) DEVICE — GLOVE SURG SZ 85 L12IN FNGR THK13MIL WHT ISOLEX POLYISOPRENE

## (undated) DEVICE — GLOVE SURG SZ 9 THK91MIL LTX FREE SYN POLYISOPRENE ANTI

## (undated) DEVICE — TUBING, SUCTION, 9/32" X 10', STRAIGHT: Brand: MEDLINE

## (undated) DEVICE — SHOECOVER ANTI-SKID: Brand: CARDINAL HEALTH

## (undated) DEVICE — SCREW BNE L80MM DIA6.5MM THRD L16MM CANC S STL SELF DRL ST
Type: IMPLANTABLE DEVICE | Site: HIP | Status: NON-FUNCTIONAL
Removed: 2020-09-10

## (undated) DEVICE — STRIP,CLOSURE,WOUND,MEDI-STRIP,1/2X4: Brand: MEDLINE

## (undated) DEVICE — 4-PORT MANIFOLD: Brand: NEPTUNE 2

## (undated) DEVICE — SOLUTION IV IRRIG POUR BRL 0.9% SODIUM CHL 2F7124

## (undated) DEVICE — CHLORAPREP 26ML ORANGE

## (undated) DEVICE — SET ORTHO STD STORTSTD1

## (undated) DEVICE — SYRINGE MED 50ML LUERLOCK TIP

## (undated) DEVICE — PADDING,UNDERCAST,COTTON, 4"X4YD STERILE: Brand: MEDLINE

## (undated) DEVICE — GUIDEWIRE ORTH L300MM DIA2.8MM S STL THRD SHRP TRCR TIP FOR

## (undated) DEVICE — NEEDLE HYPO 18GA L1.5IN PNK POLYPR HUB S STL REG BVL STR

## (undated) DEVICE — DRAPE,REIN 53X77,STERILE: Brand: MEDLINE

## (undated) DEVICE — MARKER,SKIN,WI/RULER AND LABELS: Brand: MEDLINE

## (undated) DEVICE — INSTRUMENT SYSTEM 7 BATTERY REUSABLE

## (undated) DEVICE — SUTURE STRATAFIX SYMMETRIC PDS + SZ 1 L18IN ABSRB VLT OS-6 SXPP1A201

## (undated) DEVICE — DRAPE C ARM W41XL74IN UNIV MOB W RUBBERBAND CLP

## (undated) DEVICE — DRAPE PATIENT ISOL IRRIG POUCH

## (undated) DEVICE — GOWN,SIRUS,FABRNF,L,20/CS: Brand: MEDLINE

## (undated) DEVICE — GLOVE SURG SZ 6 THK91MIL LTX FREE SYN POLYISOPRENE ANTI

## (undated) DEVICE — TRAY SYSTEM 7 DRILL REUSABLE

## (undated) DEVICE — PAD PERINL POST FOAM DISPOSABLE FOR AMSCO SURG TBL

## (undated) DEVICE — STANDARD HYPODERMIC NEEDLE,ALUMINUM HUB: Brand: MONOJECT